# Patient Record
Sex: FEMALE | Race: OTHER | HISPANIC OR LATINO | URBAN - METROPOLITAN AREA
[De-identification: names, ages, dates, MRNs, and addresses within clinical notes are randomized per-mention and may not be internally consistent; named-entity substitution may affect disease eponyms.]

---

## 2022-10-05 ENCOUNTER — INPATIENT (INPATIENT)
Facility: HOSPITAL | Age: 42
LOS: 6 days | Discharge: HOME HEALTH | DRG: 65 | End: 2022-10-12
Attending: PSYCHIATRY & NEUROLOGY | Admitting: NEUROLOGICAL SURGERY
Payer: MEDICAID

## 2022-10-05 VITALS
RESPIRATION RATE: 18 BRPM | TEMPERATURE: 98 F | OXYGEN SATURATION: 98 % | WEIGHT: 132.28 LBS | SYSTOLIC BLOOD PRESSURE: 122 MMHG | HEART RATE: 78 BPM | DIASTOLIC BLOOD PRESSURE: 81 MMHG | HEIGHT: 61.02 IN

## 2022-10-05 DIAGNOSIS — I60.9 NONTRAUMATIC SUBARACHNOID HEMORRHAGE, UNSPECIFIED: ICD-10-CM

## 2022-10-05 LAB
A1C WITH ESTIMATED AVERAGE GLUCOSE RESULT: 5.5 % — SIGNIFICANT CHANGE UP (ref 4–5.6)
ALBUMIN SERPL ELPH-MCNC: 4.8 G/DL — SIGNIFICANT CHANGE UP (ref 3.3–5)
ALP SERPL-CCNC: 67 U/L — SIGNIFICANT CHANGE UP (ref 40–120)
ALT FLD-CCNC: 28 U/L — SIGNIFICANT CHANGE UP (ref 10–45)
AMPHET UR-MCNC: NEGATIVE — SIGNIFICANT CHANGE UP
ANION GAP SERPL CALC-SCNC: 11 MMOL/L — SIGNIFICANT CHANGE UP (ref 5–17)
APPEARANCE UR: CLEAR — SIGNIFICANT CHANGE UP
APTT BLD: 32.6 SEC — SIGNIFICANT CHANGE UP (ref 27.5–35.5)
AST SERPL-CCNC: 18 U/L — SIGNIFICANT CHANGE UP (ref 10–40)
BACTERIA # UR AUTO: PRESENT /HPF
BARBITURATES UR SCN-MCNC: NEGATIVE — SIGNIFICANT CHANGE UP
BASOPHILS # BLD AUTO: 0.02 K/UL — SIGNIFICANT CHANGE UP (ref 0–0.2)
BASOPHILS NFR BLD AUTO: 0.2 % — SIGNIFICANT CHANGE UP (ref 0–2)
BENZODIAZ UR-MCNC: NEGATIVE — SIGNIFICANT CHANGE UP
BILIRUB SERPL-MCNC: 0.3 MG/DL — SIGNIFICANT CHANGE UP (ref 0.2–1.2)
BILIRUB UR-MCNC: NEGATIVE — SIGNIFICANT CHANGE UP
BLD GP AB SCN SERPL QL: NEGATIVE — SIGNIFICANT CHANGE UP
BUN SERPL-MCNC: 16 MG/DL — SIGNIFICANT CHANGE UP (ref 7–23)
CALCIUM SERPL-MCNC: 8.8 MG/DL — SIGNIFICANT CHANGE UP (ref 8.4–10.5)
CHLORIDE SERPL-SCNC: 103 MMOL/L — SIGNIFICANT CHANGE UP (ref 96–108)
CHOLEST SERPL-MCNC: 142 MG/DL — SIGNIFICANT CHANGE UP
CO2 SERPL-SCNC: 23 MMOL/L — SIGNIFICANT CHANGE UP (ref 22–31)
COCAINE METAB.OTHER UR-MCNC: NEGATIVE — SIGNIFICANT CHANGE UP
COLOR SPEC: YELLOW — SIGNIFICANT CHANGE UP
COMMENT - URINE: SIGNIFICANT CHANGE UP
CREAT SERPL-MCNC: 0.57 MG/DL — SIGNIFICANT CHANGE UP (ref 0.5–1.3)
CRP SERPL-MCNC: <3 MG/L — SIGNIFICANT CHANGE UP (ref 0–4)
DIFF PNL FLD: ABNORMAL
EGFR: 116 ML/MIN/1.73M2 — SIGNIFICANT CHANGE UP
EOSINOPHIL # BLD AUTO: 0 K/UL — SIGNIFICANT CHANGE UP (ref 0–0.5)
EOSINOPHIL NFR BLD AUTO: 0 % — SIGNIFICANT CHANGE UP (ref 0–6)
EPI CELLS # UR: SIGNIFICANT CHANGE UP /HPF (ref 0–5)
ERYTHROCYTE [SEDIMENTATION RATE] IN BLOOD: 20 MM/HR — HIGH
ESTIMATED AVERAGE GLUCOSE: 111 MG/DL — SIGNIFICANT CHANGE UP (ref 68–114)
GLUCOSE BLDC GLUCOMTR-MCNC: 87 MG/DL — SIGNIFICANT CHANGE UP (ref 70–99)
GLUCOSE BLDC GLUCOMTR-MCNC: 88 MG/DL — SIGNIFICANT CHANGE UP (ref 70–99)
GLUCOSE SERPL-MCNC: 119 MG/DL — HIGH (ref 70–99)
GLUCOSE UR QL: NEGATIVE — SIGNIFICANT CHANGE UP
HCG SERPL-ACNC: 1 MIU/ML — SIGNIFICANT CHANGE UP
HCT VFR BLD CALC: 38.9 % — SIGNIFICANT CHANGE UP (ref 34.5–45)
HDLC SERPL-MCNC: 38 MG/DL — LOW
HGB BLD-MCNC: 12.6 G/DL — SIGNIFICANT CHANGE UP (ref 11.5–15.5)
IMM GRANULOCYTES NFR BLD AUTO: 0.6 % — SIGNIFICANT CHANGE UP (ref 0–0.9)
INR BLD: 1.1 — SIGNIFICANT CHANGE UP (ref 0.88–1.16)
KETONES UR-MCNC: NEGATIVE — SIGNIFICANT CHANGE UP
LEUKOCYTE ESTERASE UR-ACNC: NEGATIVE — SIGNIFICANT CHANGE UP
LIPID PNL WITH DIRECT LDL SERPL: 83 MG/DL — SIGNIFICANT CHANGE UP
LYMPHOCYTES # BLD AUTO: 1.45 K/UL — SIGNIFICANT CHANGE UP (ref 1–3.3)
LYMPHOCYTES # BLD AUTO: 12.8 % — LOW (ref 13–44)
MAGNESIUM SERPL-MCNC: 2 MG/DL — SIGNIFICANT CHANGE UP (ref 1.6–2.6)
MCHC RBC-ENTMCNC: 28.4 PG — SIGNIFICANT CHANGE UP (ref 27–34)
MCHC RBC-ENTMCNC: 32.4 GM/DL — SIGNIFICANT CHANGE UP (ref 32–36)
MCV RBC AUTO: 87.8 FL — SIGNIFICANT CHANGE UP (ref 80–100)
METHADONE UR-MCNC: NEGATIVE — SIGNIFICANT CHANGE UP
MONOCYTES # BLD AUTO: 0.29 K/UL — SIGNIFICANT CHANGE UP (ref 0–0.9)
MONOCYTES NFR BLD AUTO: 2.6 % — SIGNIFICANT CHANGE UP (ref 2–14)
NEUTROPHILS # BLD AUTO: 9.48 K/UL — HIGH (ref 1.8–7.4)
NEUTROPHILS NFR BLD AUTO: 83.8 % — HIGH (ref 43–77)
NITRITE UR-MCNC: NEGATIVE — SIGNIFICANT CHANGE UP
NON HDL CHOLESTEROL: 104 MG/DL — SIGNIFICANT CHANGE UP
NRBC # BLD: 0 /100 WBCS — SIGNIFICANT CHANGE UP (ref 0–0)
OPIATES UR-MCNC: NEGATIVE — SIGNIFICANT CHANGE UP
PCP SPEC-MCNC: SIGNIFICANT CHANGE UP
PCP UR-MCNC: NEGATIVE — SIGNIFICANT CHANGE UP
PH UR: 7 — SIGNIFICANT CHANGE UP (ref 5–8)
PHOSPHATE SERPL-MCNC: 3.4 MG/DL — SIGNIFICANT CHANGE UP (ref 2.5–4.5)
PLATELET # BLD AUTO: 366 K/UL — SIGNIFICANT CHANGE UP (ref 150–400)
POTASSIUM SERPL-MCNC: 3.9 MMOL/L — SIGNIFICANT CHANGE UP (ref 3.5–5.3)
POTASSIUM SERPL-SCNC: 3.9 MMOL/L — SIGNIFICANT CHANGE UP (ref 3.5–5.3)
PROT SERPL-MCNC: 8.2 G/DL — SIGNIFICANT CHANGE UP (ref 6–8.3)
PROT UR-MCNC: NEGATIVE MG/DL — SIGNIFICANT CHANGE UP
PROTHROM AB SERPL-ACNC: 13.1 SEC — SIGNIFICANT CHANGE UP (ref 10.5–13.4)
RBC # BLD: 4.43 M/UL — SIGNIFICANT CHANGE UP (ref 3.8–5.2)
RBC # FLD: 12.9 % — SIGNIFICANT CHANGE UP (ref 10.3–14.5)
RBC CASTS # UR COMP ASSIST: > 10 /HPF
RH IG SCN BLD-IMP: POSITIVE — SIGNIFICANT CHANGE UP
SARS-COV-2 RNA SPEC QL NAA+PROBE: NEGATIVE — SIGNIFICANT CHANGE UP
SODIUM SERPL-SCNC: 137 MMOL/L — SIGNIFICANT CHANGE UP (ref 135–145)
SP GR SPEC: 1.02 — SIGNIFICANT CHANGE UP (ref 1–1.03)
THC UR QL: NEGATIVE — SIGNIFICANT CHANGE UP
TRIGL SERPL-MCNC: 104 MG/DL — SIGNIFICANT CHANGE UP
TROPONIN T SERPL-MCNC: 0.01 NG/ML — SIGNIFICANT CHANGE UP (ref 0–0.01)
TSH SERPL-MCNC: 1.25 UIU/ML — SIGNIFICANT CHANGE UP (ref 0.27–4.2)
UROBILINOGEN FLD QL: 0.2 E.U./DL — SIGNIFICANT CHANGE UP
WBC # BLD: 11.31 K/UL — HIGH (ref 3.8–10.5)
WBC # FLD AUTO: 11.31 K/UL — HIGH (ref 3.8–10.5)
WBC UR QL: < 5 /HPF — SIGNIFICANT CHANGE UP

## 2022-10-05 PROCEDURE — 99291 CRITICAL CARE FIRST HOUR: CPT

## 2022-10-05 PROCEDURE — 70498 CT ANGIOGRAPHY NECK: CPT | Mod: 26,MG

## 2022-10-05 PROCEDURE — G1004: CPT

## 2022-10-05 PROCEDURE — 70450 CT HEAD/BRAIN W/O DYE: CPT | Mod: 26,59,ME

## 2022-10-05 PROCEDURE — 99285 EMERGENCY DEPT VISIT HI MDM: CPT

## 2022-10-05 PROCEDURE — 70496 CT ANGIOGRAPHY HEAD: CPT | Mod: 26,ME

## 2022-10-05 PROCEDURE — 70450 CT HEAD/BRAIN W/O DYE: CPT | Mod: 26,77,59

## 2022-10-05 PROCEDURE — 93010 ELECTROCARDIOGRAM REPORT: CPT

## 2022-10-05 RX ORDER — TRAMADOL HYDROCHLORIDE 50 MG/1
25 TABLET ORAL EVERY 6 HOURS
Refills: 0 | Status: DISCONTINUED | OUTPATIENT
Start: 2022-10-05 | End: 2022-10-05

## 2022-10-05 RX ORDER — DEXTROSE 50 % IN WATER 50 %
15 SYRINGE (ML) INTRAVENOUS ONCE
Refills: 0 | Status: DISCONTINUED | OUTPATIENT
Start: 2022-10-05 | End: 2022-10-05

## 2022-10-05 RX ORDER — SODIUM CHLORIDE 9 MG/ML
1000 INJECTION INTRAMUSCULAR; INTRAVENOUS; SUBCUTANEOUS ONCE
Refills: 0 | Status: COMPLETED | OUTPATIENT
Start: 2022-10-05 | End: 2022-10-05

## 2022-10-05 RX ORDER — GLUCAGON INJECTION, SOLUTION 0.5 MG/.1ML
1 INJECTION, SOLUTION SUBCUTANEOUS ONCE
Refills: 0 | Status: DISCONTINUED | OUTPATIENT
Start: 2022-10-05 | End: 2022-10-05

## 2022-10-05 RX ORDER — ACETAMINOPHEN 500 MG
650 TABLET ORAL EVERY 6 HOURS
Refills: 0 | Status: DISCONTINUED | OUTPATIENT
Start: 2022-10-05 | End: 2022-10-08

## 2022-10-05 RX ORDER — DEXTROSE 50 % IN WATER 50 %
25 SYRINGE (ML) INTRAVENOUS ONCE
Refills: 0 | Status: DISCONTINUED | OUTPATIENT
Start: 2022-10-05 | End: 2022-10-05

## 2022-10-05 RX ORDER — DIPHENHYDRAMINE HCL 50 MG
25 CAPSULE ORAL ONCE
Refills: 0 | Status: COMPLETED | OUTPATIENT
Start: 2022-10-05 | End: 2022-10-05

## 2022-10-05 RX ORDER — TRAMADOL HYDROCHLORIDE 50 MG/1
50 TABLET ORAL EVERY 6 HOURS
Refills: 0 | Status: DISCONTINUED | OUTPATIENT
Start: 2022-10-05 | End: 2022-10-05

## 2022-10-05 RX ORDER — METOCLOPRAMIDE HCL 10 MG
10 TABLET ORAL ONCE
Refills: 0 | Status: DISCONTINUED | OUTPATIENT
Start: 2022-10-05 | End: 2022-10-05

## 2022-10-05 RX ORDER — SODIUM CHLORIDE 9 MG/ML
1000 INJECTION, SOLUTION INTRAVENOUS
Refills: 0 | Status: DISCONTINUED | OUTPATIENT
Start: 2022-10-05 | End: 2022-10-06

## 2022-10-05 RX ORDER — OXYCODONE AND ACETAMINOPHEN 5; 325 MG/1; MG/1
1 TABLET ORAL EVERY 6 HOURS
Refills: 0 | Status: DISCONTINUED | OUTPATIENT
Start: 2022-10-05 | End: 2022-10-05

## 2022-10-05 RX ORDER — NIMODIPINE 60 MG/10ML
60 SOLUTION ORAL EVERY 4 HOURS
Refills: 0 | Status: DISCONTINUED | OUTPATIENT
Start: 2022-10-05 | End: 2022-10-06

## 2022-10-05 RX ORDER — OXYCODONE AND ACETAMINOPHEN 5; 325 MG/1; MG/1
2 TABLET ORAL EVERY 6 HOURS
Refills: 0 | Status: DISCONTINUED | OUTPATIENT
Start: 2022-10-05 | End: 2022-10-05

## 2022-10-05 RX ORDER — SENNA PLUS 8.6 MG/1
2 TABLET ORAL AT BEDTIME
Refills: 0 | Status: DISCONTINUED | OUTPATIENT
Start: 2022-10-05 | End: 2022-10-12

## 2022-10-05 RX ORDER — ONDANSETRON 8 MG/1
4 TABLET, FILM COATED ORAL ONCE
Refills: 0 | Status: COMPLETED | OUTPATIENT
Start: 2022-10-05 | End: 2022-10-05

## 2022-10-05 RX ORDER — METOCLOPRAMIDE HCL 10 MG
10 TABLET ORAL ONCE
Refills: 0 | Status: COMPLETED | OUTPATIENT
Start: 2022-10-05 | End: 2022-10-05

## 2022-10-05 RX ORDER — OXYCODONE HYDROCHLORIDE 5 MG/1
10 TABLET ORAL EVERY 6 HOURS
Refills: 0 | Status: DISCONTINUED | OUTPATIENT
Start: 2022-10-05 | End: 2022-10-08

## 2022-10-05 RX ORDER — LEVETIRACETAM 250 MG/1
500 TABLET, FILM COATED ORAL
Refills: 0 | Status: DISCONTINUED | OUTPATIENT
Start: 2022-10-05 | End: 2022-10-06

## 2022-10-05 RX ORDER — SODIUM CHLORIDE 9 MG/ML
1000 INJECTION INTRAMUSCULAR; INTRAVENOUS; SUBCUTANEOUS
Refills: 0 | Status: DISCONTINUED | OUTPATIENT
Start: 2022-10-05 | End: 2022-10-06

## 2022-10-05 RX ORDER — INSULIN LISPRO 100/ML
VIAL (ML) SUBCUTANEOUS
Refills: 0 | Status: DISCONTINUED | OUTPATIENT
Start: 2022-10-05 | End: 2022-10-12

## 2022-10-05 RX ORDER — CHLORHEXIDINE GLUCONATE 213 G/1000ML
1 SOLUTION TOPICAL DAILY
Refills: 0 | Status: DISCONTINUED | OUTPATIENT
Start: 2022-10-05 | End: 2022-10-08

## 2022-10-05 RX ORDER — ACETAMINOPHEN 500 MG
1000 TABLET ORAL ONCE
Refills: 0 | Status: COMPLETED | OUTPATIENT
Start: 2022-10-05 | End: 2022-10-05

## 2022-10-05 RX ORDER — ONDANSETRON 8 MG/1
4 TABLET, FILM COATED ORAL EVERY 6 HOURS
Refills: 0 | Status: DISCONTINUED | OUTPATIENT
Start: 2022-10-05 | End: 2022-10-12

## 2022-10-05 RX ORDER — OXYCODONE HYDROCHLORIDE 5 MG/1
5 TABLET ORAL EVERY 6 HOURS
Refills: 0 | Status: DISCONTINUED | OUTPATIENT
Start: 2022-10-05 | End: 2022-10-08

## 2022-10-05 RX ADMIN — Medication 1000 MILLIGRAM(S): at 17:11

## 2022-10-05 RX ADMIN — TRAMADOL HYDROCHLORIDE 50 MILLIGRAM(S): 50 TABLET ORAL at 19:53

## 2022-10-05 RX ADMIN — ONDANSETRON 4 MILLIGRAM(S): 8 TABLET, FILM COATED ORAL at 19:37

## 2022-10-05 RX ADMIN — ONDANSETRON 4 MILLIGRAM(S): 8 TABLET, FILM COATED ORAL at 22:29

## 2022-10-05 RX ADMIN — Medication 25 MILLIGRAM(S): at 11:08

## 2022-10-05 RX ADMIN — SODIUM CHLORIDE 1000 MILLILITER(S): 9 INJECTION INTRAMUSCULAR; INTRAVENOUS; SUBCUTANEOUS at 11:08

## 2022-10-05 RX ADMIN — NIMODIPINE 60 MILLIGRAM(S): 60 SOLUTION ORAL at 21:14

## 2022-10-05 RX ADMIN — SENNA PLUS 2 TABLET(S): 8.6 TABLET ORAL at 21:14

## 2022-10-05 RX ADMIN — Medication 104 MILLIGRAM(S): at 11:08

## 2022-10-05 RX ADMIN — NIMODIPINE 60 MILLIGRAM(S): 60 SOLUTION ORAL at 18:13

## 2022-10-05 RX ADMIN — Medication 400 MILLIGRAM(S): at 16:13

## 2022-10-05 RX ADMIN — OXYCODONE AND ACETAMINOPHEN 1 TABLET(S): 5; 325 TABLET ORAL at 21:01

## 2022-10-05 RX ADMIN — OXYCODONE HYDROCHLORIDE 5 MILLIGRAM(S): 5 TABLET ORAL at 22:39

## 2022-10-05 RX ADMIN — TRAMADOL HYDROCHLORIDE 50 MILLIGRAM(S): 50 TABLET ORAL at 20:13

## 2022-10-05 RX ADMIN — LEVETIRACETAM 500 MILLIGRAM(S): 250 TABLET, FILM COATED ORAL at 18:13

## 2022-10-05 RX ADMIN — Medication 400 MILLIGRAM(S): at 11:13

## 2022-10-05 RX ADMIN — Medication 1000 MILLIGRAM(S): at 21:09

## 2022-10-05 RX ADMIN — Medication 5 MILLIGRAM(S): at 21:16

## 2022-10-05 NOTE — ED PROVIDER NOTE - NS ED ATTENDING STATEMENT MOD
This was a shared visit with the МАРИНА. I reviewed and verified the documentation and independently performed the documented:

## 2022-10-05 NOTE — ED PROVIDER NOTE - NS ED ROS FT
Constitutional: No fever. No chills.  Eyes: No redness. No discharge. No vision change.   ENT: No sore throat. No ear pain.  Cardiovascular: No chest pain. No leg swelling.  Respiratory: No cough. No shortness of breath.  GI: No abdominal pain. +vomiting. No diarrhea.   MSK: No joint pain. No back pain.   Skin: No rash. No abrasions.   Neuro: No numbness. No weakness. +headache.   Psych: No known mental health issues.

## 2022-10-05 NOTE — PROGRESS NOTE ADULT - ASSESSMENT
Assessment: 42F no medical hx admit for SAH ?vasculitis pending DSA/MRA       NEURO:  -neuro check q1  will manage as aSAH until proven negative on DSA   -nimodipine 60mg q4, keep euvolemic normonatremic   -pain management w/ Tylenol & oxycodone  -pending DSA in AM   -MRI brain +/- contrast   Vasculitis w/u -labs sent ; add antibiodies to Ro/SSA, La/SSb, dsDNA, cryoglobulins, quantitative immunoglobulin (IgG, IgM, IgA)  -PT/OT evaluation  Consider LP r/o primary angitis of CNS (PACNS)    PULMONARY:  saturating well on RA,   -continue to monitor on pulse o2     CARDIOVASCULAR:  monitor on telemetry   vitals q1  sbp goal 100-140  TTE ordered and pending    GASTROENTEROLOGY:  regular diet, NPO after midnight   ensure BMs w/ doculax & senna  Daily stool count, LBM prior to arrival    RENAL/:  -check BMP qd  -strict i/o's ;   -Na goal 135-145  -urine tox     ENDOCRINE:  A1c- 5.5  TSH-1.2    HEME/ONC:  DVT ppx: will hold chemical dvt ppx in setting of SAH  b/l SCDs  Obtain b/l LE screening dopplers    INFECTIOUS:   Monitor for fevers   check Hepatitis B & C, syphilis , HIV    Assessment: 42F no medical hx admit for SAH ?vasculitis pending DSA/MRA       NEURO:  -neuro check q1  will manage as aSAH until proven negative on DSA   -nimodipine 60mg q4, keep euvolemic normonatremic   -pain management w/ Tylenol & oxycodone  -pending DSA in AM   -MRI brain +/- contrast   Vasculitis w/u -labs sent ; add antibiodies to Ro/SSA, La/SSb, dsDNA, cryoglobulins, quantitative immunoglobulin (IgG, IgM, IgA)  -PT/OT evaluation  Consider LP r/o primary angitis of CNS (PACNS)  seizure ppx - c/w keppra 500mg BID     PULMONARY:  saturating well on RA,   -continue to monitor on pulse o2     CARDIOVASCULAR:  monitor on telemetry   vitals q1  sbp goal 100-140  TTE ordered and pending    GASTROENTEROLOGY:  regular diet, NPO after midnight   ensure BMs w/ doculax & senna  Daily stool count, LBM prior to arrival    RENAL/:  -check BMP qd  -strict i/o's ;   -Na goal 135-145  -urine tox     ENDOCRINE:  A1c- 5.5  TSH-1.2    HEME/ONC:  DVT ppx: will hold chemical dvt ppx in setting of SAH  b/l SCDs  Obtain b/l LE screening dopplers    INFECTIOUS:   Monitor for fevers   check Hepatitis B & C, syphilis , HIV

## 2022-10-05 NOTE — H&P ADULT - HISTORY OF PRESENT ILLNESS
41 yo female no pmh presenting with headache x 1 week. Pt also complaining of intermittent left eye pain with blurry vision x 3 months. CTA reveals small volume of SAH in the right temporal/occipital sulci. CTH reveals focal areas of narrowing involving R A2 and L A3 segments suspicious for vasculitis and L pcomm infundibulum. Patient reports taking baby aspirin for the past 3 days for pain control, last taken on 10/4. No history of trauma. Pt denies dizziness, n/v, blurry vision, SOB, CP, n/v, localized weakness or numbness/tingling. NIHSS 0, HH2, MF1.

## 2022-10-05 NOTE — ED PROVIDER NOTE - ATTENDING APP SHARED VISIT CONTRIBUTION OF CARE
41yo female with no reported pmhx presents with 8 days of posterior headache. Pt reports throbbing, constant headache, worse with head movement. Associated with multiple episodes of vomiting, last episode this morning. She denies recent trauma or injury. She reports remote head injury 15yr ago falling off a horse, had a similar headache at that time. She denies anticoagulant use. She also endorses body aches and chills. She denies neck stiffness, vision changes, photophobia or phonophobia, numbness or weakness of extremities, gait instability, syncope. She took one aspirin at home for pain, no additional medications.    ED course unremarkable - she is afebrile and hemodynamically stable. Normotensive throughout ED stay. No head injury or evidence of trauma on exam. She is neurologically intact. Given 1L NS bolus, IV tylenol, IV reglan, IV benadryl with improvement. CBC with slight leukocytosis. BMP unremarkable. UA with large blood, pt currently menstruating. CTH notable for small volume of subarachnoid hemorrhage within posterior right temporal and occipital sulci. CTA head and neck ordered. NSG consulted and will admit pt to NS ICU. All results discussed with patient in Salvadorean.    Agree with above note as documented by PA.  I was available as the supervising attending during patient's ER evaluation.    Pt with severe headache - found to have stable bp and no neuro deficits.  NS called for admission and agrees on admission to [facility].  Stable while in ED.

## 2022-10-05 NOTE — ED PROVIDER NOTE - OBJECTIVE STATEMENT
41yo female with no reported pmhx presents with 8 days of posterior headache. Pt reports throbbing, constant headache, worse with head movement. Associated with multiple episodes of vomiting, last episode this morning. She denies recent trauma or injury. She reports remote head injury 15yr ago falling off a horse, had a similar headache at that time. She denies anticoagulant use. She also endorses body aches and chills. She denies neck stiffness, vision changes, photophobia or phonophobia, numbness or weakness of extremities, gait instability, syncope. She took one aspirin at home for pain, no additional medications.

## 2022-10-05 NOTE — PATIENT PROFILE ADULT - FALL HARM RISK - HARM RISK INTERVENTIONS

## 2022-10-05 NOTE — ED PROVIDER NOTE - CLINICAL SUMMARY MEDICAL DECISION MAKING FREE TEXT BOX
ED course unremarkable - she is afebrile and hemodynamically stable. Normotensive throughout ED stay. No head injury or evidence of trauma on exam. She is neurologically intact. Given 1L NS bolus, IV tylenol, IV reglan, IV benadryl with improvement. CBC with slight leukocytosis. BMP unremarkable. UA with large blood, pt currently menstruating. CTH notable for small volume of subarachnoid hemorrhage within posterior right temporal and occipital sulci. CTa head and neck ordered. NSG consulted and [place] [facility]. All results discussed with patient in Jamaican.

## 2022-10-05 NOTE — ED ADULT TRIAGE NOTE - RESPIRATORY RATE (BREATHS/MIN)
18 Cephalexin Pregnancy And Lactation Text: This medication is Pregnancy Category B and considered safe during pregnancy.  It is also excreted in breast milk but can be used safely for shorter doses.

## 2022-10-05 NOTE — ED ADULT NURSE REASSESSMENT NOTE - NS ED NURSE REASSESS COMMENT FT1
Pt.  made UG from SS portion of ED to MD Nuvia Corcoran, and from PHI Watkins to PHI Perrin on [facility]. Pt. placed on ccm, pulseox, and auto BP. Neuro sgy @ bedside, second IV access placed. will continue to assess.

## 2022-10-05 NOTE — PROGRESS NOTE ADULT - ASSESSMENT
ASSESSMENT/PLAN: HH2 mF1 subarachnoid hemorrhage, bleed day 1  Etiology- possible vasculitis  L PCOMM infundibulum on CTA    NEURO:  Diagnosis: CT Head, CTA Head reviewed. Focal narrowing of R A2, L A2   Stability CT head pending  MRI w/wo pending  Pending conventional angiogram; neurointervention alerted  Seizure Prophylaxis: Levetiracetam for now  Q1 neurochecks  Delayed Cerebral Ischemia Management: Euvolemia, nimodipine.  Pain: PRN analgesics  Vasculitis workup: Send BILL, ESR, CRP, complement levels C3, C4, ANCA.  Urine toxicology screen  Stroke core measures  Stroke consult  Activity: [] OOB as tolerated [x] Bedrest [] PT [] OT [] PMNR    PULM:  Incentive spirometry  On room air    CV:  SBP goal 90- 140  Baseline EKG- T wave inversions in V2  TTE pending  Send troponin    RENAL:  Fluids: NS@60  Monitor electrolytes    GI:  Diet: NPO at MN.  GI prophylaxis [] not indicated [] PPI [] other:  Bowel regimen [] colace [x] senna [] other:    ENDO:   Goal euglycemia (-180)  A1c, TSH, LDL.    HEME/ONC: ASA 3 days ago  VTE prophylaxis: [x] SCDs [] chemoprophylaxis   [x] hold chemoprophylaxis due to: [] high risk of DVT/PE on admission due to:    ID: Mild leukocytosis  Afebrile. Monitor for signs of infection    MISC:  Consider rheumatology consult if suspicious for vasculitis    SOCIAL/FAMILY:  [x] awaiting [] updated at bedside [] family meeting    CODE STATUS:  [x] Full Code [] DNR [] DNI [] Palliative/Comfort Care    DISPOSITION:  [x] ICU [] Stroke Unit [] Floor [] EMU [] RCU [] PCU    [x] Patient is at high risk of neurologic deterioration/death due to:     Time spent: 45 critical care minutes ASSESSMENT/PLAN: HH2 mF1 subarachnoid hemorrhage, bleed day 1  Etiology- possible vasculitis  L PCOMM infundibulum on CTA    NEURO:  Diagnosis: CT Head, CTA Head reviewed. Focal narrowing of R A2, L A2   Stability CT head pending  MRI w/wo pending  Pending conventional angiogram; (neurointervention alerted)  Seizure Prophylaxis: Levetiracetam for now  Q1 neurochecks  Delayed Cerebral Ischemia Management: Euvolemia, nimodipine.  Pain: PRN analgesics  Vasculitis workup: Send BILL, ESR, CRP, complement levels C3, C4, ANCA.  Urine toxicology screen  Stroke core measures  Stroke  neurology consult  Activity: [] OOB as tolerated [x] Bedrest [] PT [] OT [] PMNR    PULM:  Incentive spirometry  On room air    CV:  SBP goal 90- 140  Baseline EKG- T wave inversions in V2  TTE pending  Send troponin    RENAL:  Fluids: NS@60  Monitor electrolytes    GI:  Diet: NPO at MN.  GI prophylaxis [] not indicated [] PPI [] other:  Bowel regimen [] colace [x] senna [] other:    ENDO:   Goal euglycemia (-180)  A1c, TSH, LDL.    HEME/ONC: ASA 3 days ago  VTE prophylaxis: [x] SCDs [] chemoprophylaxis   [x] hold chemoprophylaxis due to: [] high risk of DVT/PE on admission due to:    ID: Mild leukocytosis  Afebrile. Monitor for signs of infection    MISC:  Consider rheumatology consult if suspicious for vasculitis    SOCIAL/FAMILY:  [x] awaiting [] updated at bedside [] family meeting    CODE STATUS:  [x] Full Code [] DNR [] DNI [] Palliative/Comfort Care    DISPOSITION:  [x] ICU [] Stroke Unit [] Floor [] EMU [] RCU [] PCU    [x] Patient is at high risk of neurologic deterioration/death due to: vasospasm, seizure, VTE    Time spent: 45 critical care minutes

## 2022-10-05 NOTE — PROGRESS NOTE ADULT - SUBJECTIVE AND OBJECTIVE BOX
INTERVAL HISTORY: HPI:  41 yo female no pmh presenting with headache x 1 week. Pt also complaining of intermittent left eye pain with blurry vision x 3 months. CTA reveals small volume of SAH in the right temporal/occipital sulci. CTH reveals focal areas of narrowing involving R A2 and L A3 segments suspicious for vasculitis and L pcomm infundibulum. Patient reports taking baby aspirin for the past 3 days for pain control, last taken on 10/4. No history of trauma. Pt denies dizziness, n/v, blurry vision, SOB, CP, n/v, localized weakness or numbness/tingling. NIHSS 0, HH2, MF1. (05 Oct 2022 15:07)      MEDICATIONS  (STANDING):  bisacodyl 5 milliGRAM(s) Oral at bedtime  chlorhexidine 2% Cloths 1 Application(s) Topical daily  dextrose 5%. 1000 milliLiter(s) (50 mL/Hr) IV Continuous <Continuous>  insulin lispro (ADMELOG) corrective regimen sliding scale   SubCutaneous Before meals and at bedtime  levETIRAcetam 500 milliGRAM(s) Oral two times a day  niMODipine 60 milliGRAM(s) Oral every 4 hours  senna 2 Tablet(s) Oral at bedtime  sodium chloride 0.9%. 1000 milliLiter(s) (60 mL/Hr) IV Continuous <Continuous>    MEDICATIONS  (PRN):  acetaminophen     Tablet .. 650 milliGRAM(s) Oral every 6 hours PRN Temp greater or equal to 38C (100.4F), Mild Pain (1 - 3)  ondansetron Injectable 4 milliGRAM(s) IV Push every 6 hours PRN Nausea and/or Vomiting  oxyCODONE    IR 5 milliGRAM(s) Oral every 6 hours PRN Moderate Pain (4 - 6)  oxyCODONE    IR 10 milliGRAM(s) Oral every 6 hours PRN Severe Pain (7 - 10)      Drug Dosing Weight  Height (cm): 155 (05 Oct 2022 09:32)  Weight (kg): 60 (05 Oct 2022 09:32)  BMI (kg/m2): 25 (05 Oct 2022 09:32)  BSA (m2): 1.59 (05 Oct 2022 09:32)    PAST MEDICAL & SURGICAL HISTORY:  Subarachnoid hemorrhage, nontraumatic      REVIEW OF SYSTEMS: [ ] Unable to Assess due to neurologic exam   [x] All ROS addressed below are non-contributory, except:  Neuro: [ ] Headache [ ] Back pain [ ] Numbness [ ] Weakness [ ] Ataxia [ ] Dizziness [ ] Aphasia [ ] Dysarthria [ ] Visual disturbance  Resp: [ ] Shortness of breath/dyspnea, [ ] Orthopnea [ ] Cough  CV: [ ] Chest pain [ ] Palpitation [ ] Lightheadedness [ ] Syncope  Renal: [ ] Thirst [ ] Edema  GI: [ ] Nausea [ ] Emesis [ ] Abdominal pain [ ] Constipation [ ] Diarrhea  Hem: [ ] Hematemesis [ ] bright red blood per rectum  ID: [ ] Fever [ ] Chills [ ] Dysuria  ENT: [ ] Rhinorrhea    PHYSICAL EXAM:    General: No Acute Distress   Neurological: Awake, alert oriented to person, place and time, Following Commands, PERRL, EOMI, Face Symmetrical, Speech Fluent, Moving all extremities, Muscle Strength normal in all four extremities, No Drift, Sensation to Light Touch Intact  Pulmonary: Clear to Auscultation, No Rales, No Rhonchi, No Wheezes   Cardiovascular: S1, S2, Regular Rate and rhythm   Gastrointestinal: Soft, Nontender, Nondistended   Extremities: No calf tenderness       ICU Vital Signs Last 24 Hrs  T(C): 37.1 (05 Oct 2022 16:00), Max: 37.1 (05 Oct 2022 16:00)  T(F): 98.8 (05 Oct 2022 16:00), Max: 98.8 (05 Oct 2022 16:00)  HR: 68 (05 Oct 2022 21:00) (52 - 80)  BP: 118/58 (05 Oct 2022 21:00) (104/59 - 129/65)  BP(mean): 82 (05 Oct 2022 21:00) (77 - 90)  RR: 14 (05 Oct 2022 21:00) (14 - 18)  SpO2: 98% (05 Oct 2022 21:00) (98% - 99%)    O2 Parameters below as of 05 Oct 2022 22:00  Patient On (Oxygen Delivery Method): room air    I&O's Detail    05 Oct 2022 07:01  -  05 Oct 2022 21:35  --------------------------------------------------------  IN:    Oral Fluid: 120 mL    sodium chloride 0.9%: 420 mL  Total IN: 540 mL    OUT:    Voided (mL): 600 mL  Total OUT: 600 mL    Total NET: -60 mL      LABS:  CBC Full  -  ( 05 Oct 2022 09:39 )  WBC Count : 11.31 K/uL  RBC Count : 4.43 M/uL  Hemoglobin : 12.6 g/dL  Hematocrit : 38.9 %  Platelet Count - Automated : 366 K/uL  Mean Cell Volume : 87.8 fl  Mean Cell Hemoglobin : 28.4 pg  Mean Cell Hemoglobin Concentration : 32.4 gm/dL  Auto Neutrophil # : 9.48 K/uL  Auto Lymphocyte # : 1.45 K/uL  Auto Monocyte # : 0.29 K/uL  Auto Eosinophil # : 0.00 K/uL  Auto Basophil # : 0.02 K/uL  Auto Neutrophil % : 83.8 %  Auto Lymphocyte % : 12.8 %  Auto Monocyte % : 2.6 %  Auto Eosinophil % : 0.0 %  Auto Basophil % : 0.2 %    10-    137  |  103  |  16  ----------------------------<  119<H>  3.9   |  23  |  0.57    Ca    8.8      05 Oct 2022 09:39  Phos  3.4     10-05  Mg     2.0     10-05    TPro  8.2  /  Alb  4.8  /  TBili  0.3  /  DBili  x   /  AST  18  /  ALT  28  /  AlkPhos  67  10-05    PT/INR - ( 05 Oct 2022 12:40 )   PT: 13.1 sec;   INR: 1.10          PTT - ( 05 Oct 2022 12:40 )  PTT:32.6 sec  Urinalysis Basic - ( 05 Oct 2022 09:39 )    Color: Yellow / Appearance: Clear / S.020 / pH: x  Gluc: x / Ketone: NEGATIVE  / Bili: Negative / Urobili: 0.2 E.U./dL   Blood: x / Protein: NEGATIVE mg/dL / Nitrite: NEGATIVE   Leuk Esterase: NEGATIVE / RBC: > 10 /HPF / WBC < 5 /HPF   Sq Epi: x / Non Sq Epi: 0-5 /HPF / Bacteria: Present /HPF    RADIOLOGY & ADDITIONAL STUDIES:

## 2022-10-05 NOTE — ED PROVIDER NOTE - PHYSICAL EXAMINATION
VITAL SIGNS: I have reviewed nursing notes and confirm.  CONSTITUTIONAL: Well-developed; well-nourished; in no acute distress.   SKIN:  warm and dry, no acute rash.   HEAD:  normocephalic, atraumatic.  EYES: PERRL, EOM intact; conjunctiva and sclera clear.  ENT: No nasal discharge; airway clear.   NECK: Supple; non tender. No meningeal signs.   CARD: S1, S2 normal; no murmurs, gallops, or rubs. Regular rate and rhythm.   RESP:  Clear to auscultation b/l, no wheezes, rales or rhonchi.  ABD: Normal bowel sounds; soft; non-distended; non-tender; no guarding/ rebound.  EXT: Normal ROM. No clubbing, cyanosis or edema. 2+ pulses to b/l ue/le.  NEURO: Alert, oriented, grossly unremarkable. CN II-XII intact. Finger to nose intact. No pronator drift. ambulatory. 5/5 strength in all extremities. Sensation equal and intact.   PSYCH: Cooperative, mood and affect appropriate.

## 2022-10-05 NOTE — PROGRESS NOTE ADULT - SUBJECTIVE AND OBJECTIVE BOX
HPI:  43 yo female no pmh presenting with headache x 1 week. Pt also complaining of intermittent left eye pain with blurry vision x 3 months. CTA reveals small volume of SAH in the right temporal/occipital sulci. CTH reveals focal areas of narrowing involving R A2 and L A3 segments suspicious for vasculitis and L pcomm infundibulum. Patient reports taking baby aspirin for the past 3 days for pain control, last taken on 10/4. No history of trauma. Pt denies dizziness, n/v, blurry vision, SOB, CP, n/v, localized weakness or numbness/tingling. NIHSS 0, HH2, MF1. (05 Oct 2022 15:07)    On admission, the patient was:  GCS:  Alonso-Sosa: 2  modified Nogueira: 1  ICH score:  NIHSS: 0    *** HIGH RISK OF DVT PRESENT ON ADMISSION ***    ICU Vital Signs Last 24 Hrs  T(C): 36.9 (05 Oct 2022 09:32), Max: 36.9 (05 Oct 2022 09:32)  T(F): 98.4 (05 Oct 2022 09:32), Max: 98.4 (05 Oct 2022 09:32)  HR: 66 (05 Oct 2022 15:12) (66 - 80)  BP: 108/66 (05 Oct 2022 15:12) (108/66 - 122/81)  RR: 17 (05 Oct 2022 15:12) (17 - 18)  SpO2: 99% (05 Oct 2022 15:12) (98% - 99%)    EXAMINATION:  General: Mild painful distress  HEENT: MMM  Neuro: Awake, alert, oriented x 3, follows commands, moves all extremities antigravity with some pain limitation  Cards: S1/S2, RRR  Respiratory: Clear, no wheeze  Abdomen: Soft, non-tender  Extremities: No edema  Skin:  Warm/dry      MEDICATIONS:  acetaminophen     Tablet .. 650 milliGRAM(s) Oral every 6 hours PRN  acetaminophen   IVPB .. 1000 milliGRAM(s) IV Intermittent once  bisacodyl 5 milliGRAM(s) Oral at bedtime  chlorhexidine 2% Cloths 1 Application(s) Topical daily  dextrose 5%. 1000 milliLiter(s) (50 mL/Hr) IV Continuous <Continuous>  dextrose 50% Injectable 25 Gram(s) IV Push once  dextrose Oral Gel 15 Gram(s) Oral once PRN  glucagon  Injectable 1 milliGRAM(s) IntraMuscular once  insulin lispro (ADMELOG) corrective regimen sliding scale   SubCutaneous Before meals and at bedtime  levETIRAcetam 500 milliGRAM(s) Oral two times a day  niMODipine 60 milliGRAM(s) Oral every 4 hours  ondansetron Injectable 4 milliGRAM(s) IV Push every 6 hours PRN  senna 2 Tablet(s) Oral at bedtime  sodium chloride 0.9%. 1000 milliLiter(s) (60 mL/Hr) IV Continuous <Continuous>      LABS:                      12.6   11.31 )-----------( 366      ( 05 Oct 2022 09:39 )             38.9     10-05    137  |  103  |  16  ----------------------------<  119<H>  3.9   |  23  |  0.57    Ca    8.8      05 Oct 2022 09:39    TPro  8.2  /  Alb  4.8  /  TBili  0.3  /  DBili  x   /  AST  18  /  ALT  28  /  AlkPhos  67  10-05    LIVER FUNCTIONS - ( 05 Oct 2022 09:39 )  Alb: 4.8 g/dL / Pro: 8.2 g/dL / ALK PHOS: 67 U/L / ALT: 28 U/L / AST: 18 U/L / GGT: x              HPI:  Patient is a 41 y/o female with no significant PMH, non smoker. She presents with headache x 1 week. Pt also complaining of intermittent left eye pain with blurry vision x 3 months. At times, she states that she is unable to lift the left eyelid.  In ED, CT head showed small volume of SAH in the right temporal/occipital sulci. CTA showed focal areas of narrowing involving R A2 and L A3 segments suspicious for vasculitis as well as L Pcomm infundibulum. Patient reports taking baby aspirin for the past 3 days for pain control, last taken 3 days ago. She denies history of trauma. Pt denies dizziness, n/v, blurry vision, SOB, CP, n/v, localized weakness or numbness/tingling. NIHSS 0, HH2, MF1. (05 Oct 2022 15:07)    On admission, the patient was:  GCS:  Alonso-Sosa: 2  modified Nogueira: 1  ICH score:  NIHSS: 0    *** HIGH RISK OF DVT PRESENT ON ADMISSION ***    ICU Vital Signs Last 24 Hrs  T(C): 36.9 (05 Oct 2022 09:32), Max: 36.9 (05 Oct 2022 09:32)  T(F): 98.4 (05 Oct 2022 09:32), Max: 98.4 (05 Oct 2022 09:32)  HR: 66 (05 Oct 2022 15:12) (66 - 80)  BP: 108/66 (05 Oct 2022 15:12) (108/66 - 122/81)  RR: 17 (05 Oct 2022 15:12) (17 - 18)  SpO2: 99% (05 Oct 2022 15:12) (98% - 99%)    EXAMINATION:  General: Mild painful distress  HEENT: MMM  Neuro: Awake, alert, oriented x 3, face symmetric, visual fields intact, follows commands, moves all extremities antigravity with some pain limitation  Cards: S1/S2, RRR  Respiratory: Clear, no wheeze  Abdomen: Soft, non-tender  Extremities: No edema  Skin:  Warm/dry      MEDICATIONS:  acetaminophen     Tablet .. 650 milliGRAM(s) Oral every 6 hours PRN  acetaminophen   IVPB .. 1000 milliGRAM(s) IV Intermittent once  bisacodyl 5 milliGRAM(s) Oral at bedtime  chlorhexidine 2% Cloths 1 Application(s) Topical daily  dextrose 5%. 1000 milliLiter(s) (50 mL/Hr) IV Continuous <Continuous>  dextrose 50% Injectable 25 Gram(s) IV Push once  dextrose Oral Gel 15 Gram(s) Oral once PRN  glucagon  Injectable 1 milliGRAM(s) IntraMuscular once  insulin lispro (ADMELOG) corrective regimen sliding scale   SubCutaneous Before meals and at bedtime  levETIRAcetam 500 milliGRAM(s) Oral two times a day  niMODipine 60 milliGRAM(s) Oral every 4 hours  ondansetron Injectable 4 milliGRAM(s) IV Push every 6 hours PRN  senna 2 Tablet(s) Oral at bedtime  sodium chloride 0.9%. 1000 milliLiter(s) (60 mL/Hr) IV Continuous <Continuous>      LABS:                      12.6   11.31 )-----------( 366      ( 05 Oct 2022 09:39 )             38.9     10-05    137  |  103  |  16  ----------------------------<  119<H>  3.9   |  23  |  0.57    Ca    8.8      05 Oct 2022 09:39    TPro  8.2  /  Alb  4.8  /  TBili  0.3  /  DBili  x   /  AST  18  /  ALT  28  /  AlkPhos  67  10-05    LIVER FUNCTIONS - ( 05 Oct 2022 09:39 )  Alb: 4.8 g/dL / Pro: 8.2 g/dL / ALK PHOS: 67 U/L / ALT: 28 U/L / AST: 18 U/L / GGT: x

## 2022-10-05 NOTE — H&P ADULT - ASSESSMENT
43 yo female no pmh presenting with headache x 1 week. CTH reveals small volume of SAH in the right temporal/occipital sulci. CTA reveals focal areas of narrowing involving R A2 and L A3 segments suspicious for vasculitis and L pcomm infundibulum. Admitted to ICU, pending cerebral angiogram. NIHSS 0, HH2, MF1.

## 2022-10-05 NOTE — H&P ADULT - PROBLEM SELECTOR PLAN 1
-admit to NSICU, Dr. D'Amico  -neuro/vitals q1hr  -pain control prn  -keppra 500mgbid  -nimodipine 60q4  -MRI brain  -cerebral angiogram  -stroke neuro consult  -d/w Dr. D'Amico

## 2022-10-05 NOTE — ED ADULT NURSE NOTE - CHIEF COMPLAINT QUOTE
[place] speaking, pt c/o frontal HA x 1 week, vomiting and intermittent subjective fevers. pt also states she has been on menstrual period x 10 days, usually last only 3 days.

## 2022-10-05 NOTE — ED ADULT NURSE NOTE - NSIMPLEMENTINTERV_GEN_ALL_ED
Implemented All Universal Safety Interventions:  [place] to call system. Call bell, personal items and telephone within reach. Instruct patient to call for assistance. Room bathroom lighting operational. Non-slip footwear when patient is off stretcher. Physically safe environment: no spills, clutter or unnecessary equipment. Stretcher in lowest position, wheels locked, appropriate side rails in place.

## 2022-10-05 NOTE — PATIENT PROFILE ADULT - PATIENT/CAREGIVER OFFERED  INTERPRETER SERVICES
yes Colchicine Counseling:  Patient counseled regarding adverse effects including but not limited to stomach upset (nausea, vomiting, stomach pain, or diarrhea). Patient instructed to limit alcohol consumption while taking this medication. Colchicine may reduce blood counts especially with prolonged use. The patient understands that monitoring of kidney function and blood counts may be required, especially at baseline. The patient verbalized understanding of the proper use and possible adverse effects of colchicine. All of the patient's questions and concerns were addressed. Hydroxyzine Pregnancy And Lactation Text: This medication is not safe during pregnancy and should not be taken. It is also excreted in breast milk and breast feeding isn't recommended. Benzoyl Peroxide Counseling: Patient counseled that medicine may cause skin irritation and bleach clothing. In the event of skin irritation, the patient was advised to reduce the amount of the drug applied or use it less frequently. The patient verbalized understanding of the proper use and possible adverse effects of benzoyl peroxide. All of the patient's questions and concerns were addressed. Taltz Pregnancy And Lactation Text: The risk during pregnancy and breastfeeding is uncertain with this medication. Solaraze Counseling:  I discussed with the patient the risks of Solaraze including but not limited to erythema, scaling, itching, weeping, crusting, and pain. Itraconazole Pregnancy And Lactation Text: This medication is Pregnancy Category C and it isn't know if it is safe during pregnancy. It is also excreted in breast milk. Bactrim Pregnancy And Lactation Text: This medication is Pregnancy Category D and is known to cause fetal risk. It is also excreted in breast milk. Dapsone Pregnancy And Lactation Text: This medication is Pregnancy Category C and is not considered safe during pregnancy or breast feeding. Methotrexate Pregnancy And Lactation Text: This medication is Pregnancy Category X and is known to cause fetal harm. This medication is excreted in breast milk. Minocycline Counseling: Patient advised regarding possible photosensitivity and discoloration of the teeth, skin, lips, tongue and gums. Patient instructed to avoid sunlight, if possible. When exposed to sunlight, patients should wear protective clothing, sunglasses, and sunscreen. The patient was instructed to call the office immediately if the following severe adverse effects occur:  hearing changes, easy bruising/bleeding, severe headache, or vision changes. The patient verbalized understanding of the proper use and possible adverse effects of minocycline. All of the patient's questions and concerns were addressed. Spironolactone Counseling: Patient advised regarding risks of diarrhea, abdominal pain, hyperkalemia, birth defects (for female patients), liver toxicity and renal toxicity. The patient may need blood work to monitor liver and kidney function and potassium levels while on therapy. The patient verbalized understanding of the proper use and possible adverse effects of spironolactone. All of the patient's questions and concerns were addressed. Enbrel Counseling:  I discussed with the patient the risks of etanercept including but not limited to myelosuppression, immunosuppression, autoimmune hepatitis, demyelinating diseases, lymphoma, and infections. The patient understands that monitoring is required including a PPD at baseline and must alert us or the primary physician if symptoms of infection or other concerning signs are noted. Imiquimod Counseling:  I discussed with the patient the risks of imiquimod including but not limited to erythema, scaling, itching, weeping, crusting, and pain. Patient understands that the inflammatory response to imiquimod is variable from person to person and was educated regarded proper titration schedule. If flu-like symptoms develop, patient knows to discontinue the medication and contact us. Clofazimine Counseling:  I discussed with the patient the risks of clofazimine including but not limited to skin and eye pigmentation, liver damage, nausea/vomiting, gastrointestinal bleeding and allergy. Topical Sulfur Applications Pregnancy And Lactation Text: This medication is Pregnancy Category C and has an unknown safety profile during pregnancy. It is unknown if this topical medication is excreted in breast milk. Topical Sulfur Applications Counseling: Topical Sulfur Counseling: Patient counseled that this medication may cause skin irritation or allergic reactions. In the event of skin irritation, the patient was advised to reduce the amount of the drug applied or use it less frequently. The patient verbalized understanding of the proper use and possible adverse effects of topical sulfur application. All of the patient's questions and concerns were addressed. Birth Control Pills Counseling: Birth Control Pill Counseling: I discussed with the patient the potential side effects of OCPs including but not limited to increased risk of stroke, heart attack, thrombophlebitis, deep venous thrombosis, hepatic adenomas, breast changes, GI upset, headaches, and depression. The patient verbalized understanding of the proper use and possible adverse effects of OCPs. All of the patient's questions and concerns were addressed. Terbinafine Pregnancy And Lactation Text: This medication is Pregnancy Category B and is considered safe during pregnancy. It is also excreted in breast milk and breast feeding isn't recommended. Oxybutynin Pregnancy And Lactation Text: This medication is Pregnancy Category B and is considered safe during pregnancy. It is unknown if it is excreted in breast milk. Albendazole Counseling:  I discussed with the patient the risks of albendazole including but not limited to cytopenia, kidney damage, nausea/vomiting and severe allergy. The patient understands that this medication is being used in an off-label manner. Azithromycin Pregnancy And Lactation Text: This medication is considered safe during pregnancy and is also secreted in breast milk. Xeljanz Counseling: Flor Field Counseling: I discussed with the patient the risks of Flor Field therapy including increased risk of infection, liver issues, headache, diarrhea, or cold symptoms. Live vaccines should be avoided. They were instructed to call if they have any problems. Griseofulvin Pregnancy And Lactation Text: This medication is Pregnancy Category X and is known to cause serious birth defects. It is unknown if this medication is excreted in breast milk but breast feeding should be avoided. Gabapentin Pregnancy And Lactation Text: This medication is Pregnancy Category C and isn't considered safe during pregnancy. It is excreted in breast milk. Doxepin Pregnancy And Lactation Text: This medication is Pregnancy Category C and it isn't known if it is safe during pregnancy. It is also excreted in breast milk and breast feeding isn't recommended. Otezla Counseling: Carnella Old Counseling: The side effects of Carnella Old were discussed with the patient, including but not limited to worsening or new depression, weight loss, diarrhea, nausea, upper respiratory tract infection, and headache. Patient instructed to call the office should any adverse effect occur. The patient verbalized understanding of the proper use and possible adverse effects of Otezla. All the patient's questions and concerns were addressed. Prednisone Counseling:  I discussed with the patient the risks of prolonged use of prednisone including but not limited to weight gain, insomnia, osteoporosis, mood changes, diabetes, susceptibility to infection, glaucoma and high blood pressure. In cases where prednisone use is prolonged, patients should be monitored with blood pressure checks, serum glucose levels and an eye exam.  Additionally, the patient may need to be placed on GI prophylaxis, PCP prophylaxis, and calcium and vitamin D supplementation and/or a bisphosphonate. The patient verbalized understanding of the proper use and the possible adverse effects of prednisone. All of the patient's questions and concerns were addressed. SSKI Counseling:  I discussed with the patient the risks of SSKI including but not limited to thyroid abnormalities, metallic taste, GI upset, fever, headache, acne, arthralgias, paraesthesias, lymphadenopathy, easy bleeding, arrhythmias, and allergic reaction. Acitretin Pregnancy And Lactation Text: This medication is Pregnancy Category X and should not be given to women who are pregnant or may become pregnant in the future. This medication is excreted in breast milk. Acitretin Counseling:  I discussed with the patient the risks of acitretin including but not limited to hair loss, dry lips/skin/eyes, liver damage, hyperlipidemia, depression/suicidal ideation, photosensitivity. Serious rare side effects can include but are not limited to pancreatitis, pseudotumor cerebri, bony changes, clot formation/stroke/heart attack. Patient understands that alcohol is contraindicated since it can result in liver toxicity and significantly prolong the elimination of the drug by many years. Carac Pregnancy And Lactation Text: This medication is Pregnancy Category X and contraindicated in pregnancy and in women who may become pregnant. It is unknown if this medication is excreted in breast milk. Rituxan Counseling:  I discussed with the patient the risks of Rituxan infusions. Side effects can include infusion reactions, severe drug rashes including mucocutaneous reactions, reactivation of latent hepatitis and other infections and rarely progressive multifocal leukoencephalopathy. All of the patient's questions and concerns were addressed. Fluconazole Counseling:  Patient counseled regarding adverse effects of fluconazole including but not limited to headache, diarrhea, nausea, upset stomach, liver function test abnormalities, taste disturbance, and stomach pain. There is a rare possibility of liver failure that can occur when taking fluconazole. The patient understands that monitoring of LFTs and kidney function test may be required, especially at baseline. The patient verbalized understanding of the proper use and possible adverse effects of fluconazole. All of the patient's questions and concerns were addressed. Arava Counseling:  Patient counseled regarding adverse effects of Arava including but not limited to nausea, vomiting, abnormalities in liver function tests. Patients may develop mouth sores, rash, diarrhea, and abnormalities in blood counts. The patient understands that monitoring is required including LFTs and blood counts. There is a rare possibility of scarring of the liver and lung problems that can occur when taking methotrexate. Persistent nausea, loss of appetite, pale stools, dark urine, cough, and shortness of breath should be reported immediately. Patient advised to discontinue Arava treatment and consult with a physician prior to attempting conception. The patient will have to undergo a treatment to eliminate Arava from the body prior to conception. Minoxidil Counseling: Minoxidil is a topical medication which can increase blood flow where it is applied. It is uncertain how this medication increases hair growth. Side effects are uncommon and include stinging and allergic reactions. Azathioprine Pregnancy And Lactation Text: This medication is Pregnancy Category D and isn't considered safe during pregnancy. It is unknown if this medication is excreted in breast milk. Stelara Pregnancy And Lactation Text: This medication is Pregnancy Category B and is considered safe during pregnancy. It is unknown if this medication is excreted in breast milk. Eucrisa Counseling: Patient may experience a mild burning sensation during topical application. Chris Daigle is not approved in children less than 3years of age. Protopic Pregnancy And Lactation Text: This medication is Pregnancy Category C. It is unknown if this medication is excreted in breast milk when applied topically. Stelara Counseling:  I discussed with the patient the risks of ustekinumab including but not limited to immunosuppression, malignancy, posterior leukoencephalopathy syndrome, and serious infections. The patient understands that monitoring is required including a PPD at baseline and must alert us or the primary physician if symptoms of infection or other concerning signs are noted. Siliq Counseling:  I discussed with the patient the risks of Siliq including but not limited to new or worsening depression, suicidal thoughts and behavior, immunosuppression, malignancy, posterior leukoencephalopathy syndrome, and serious infections. The patient understands that monitoring is required including a PPD at baseline and must alert us or the primary physician if symptoms of infection or other concerning signs are noted. There is also a special program designed to monitor depression which is required with Siliq. Xolair Pregnancy And Lactation Text: This medication is Pregnancy Category B and is considered safe during pregnancy. This medication is excreted in breast milk. Hydroxychloroquine Counseling:  I discussed with the patient that a baseline ophthalmologic exam is needed at the start of therapy and every year thereafter while on therapy. A CBC may also be warranted for monitoring. The side effects of this medication were discussed with the patient, including but not limited to agranulocytosis, aplastic anemia, seizures, rashes, retinopathy, and liver toxicity. Patient instructed to call the office should any adverse effect occur. The patient verbalized understanding of the proper use and possible adverse effects of Plaquenil. All the patient's questions and concerns were addressed. Cimetidine Counseling:  I discussed with the patient the risks of Cimetidine including but not limited to gynecomastia, headache, diarrhea, nausea, drowsiness, arrhythmias, pancreatitis, skin rashes, psychosis, bone marrow suppression and kidney toxicity. Gabapentin Counseling: I discussed with the patient the risks of gabapentin including but not limited to dizziness, somnolence, fatigue and ataxia. Taltz Counseling: I discussed with the patient the risks of ixekizumab including but not limited to immunosuppression, serious infections, worsening of inflammatory bowel disease and drug reactions. The patient understands that monitoring is required including a PPD at baseline and must alert us or the primary physician if symptoms of infection or other concerning signs are noted. Simponi Counseling:  I discussed with the patient the risks of golimumab including but not limited to myelosuppression, immunosuppression, autoimmune hepatitis, demyelinating diseases, lymphoma, and serious infections. The patient understands that monitoring is required including a PPD at baseline and must alert us or the primary physician if symptoms of infection or other concerning signs are noted. Rifampin Pregnancy And Lactation Text: This medication is Pregnancy Category C and it isn't know if it is safe during pregnancy. It is also excreted in breast milk and should not be used if you are breast feeding. Isotretinoin Counseling: Patient should get monthly blood tests, not donate blood, not drive at night if vision affected, not share medication, and not undergo elective surgery for 6 months after tx completed. Side effects reviewed, pt to contact office should one occur. Griseofulvin Counseling:  I discussed with the patient the risks of griseofulvin including but not limited to photosensitivity, cytopenia, liver damage, nausea/vomiting and severe allergy. The patient understands that this medication is best absorbed when taken with a fatty meal (e.g., ice cream or french fries). Benzoyl Peroxide Pregnancy And Lactation Text: This medication is Pregnancy Category C. It is unknown if benzoyl peroxide is excreted in breast milk. Glycopyrrolate Counseling:  I discussed with the patient the risks of glycopyrrolate including but not limited to skin rash, drowsiness, dry mouth, difficulty urinating, and blurred vision. Odomzo Pregnancy And Lactation Text: This medication is Pregnancy Category X and is absolutely contraindicated during pregnancy. It is unknown if it is excreted in breast milk. Drysol Pregnancy And Lactation Text: This medication is considered safe during pregnancy and breast feeding. Methotrexate Counseling:  Patient counseled regarding adverse effects of methotrexate including but not limited to nausea, vomiting, abnormalities in liver function tests. Patients may develop mouth sores, rash, diarrhea, and abnormalities in blood counts. The patient understands that monitoring is required including LFT's and blood counts. There is a rare possibility of scarring of the liver and lung problems that can occur when taking methotrexate. Persistent nausea, loss of appetite, pale stools, dark urine, cough, and shortness of breath should be reported immediately. Patient advised to discontinue methotrexate treatment at least three months before attempting to become pregnant. I discussed the need for folate supplements while taking methotrexate. These supplements can decrease side effects during methotrexate treatment. The patient verbalized understanding of the proper use and possible adverse effects of methotrexate. All of the patient's questions and concerns were addressed. Valtrex Pregnancy And Lactation Text: this medication is Pregnancy Category B and is considered safe during pregnancy. This medication is not directly found in breast milk but it's metabolite acyclovir is present. Valtrex Counseling: I discussed with the patient the risks of valacyclovir including but not limited to kidney damage, nausea, vomiting and severe allergy. The patient understands that if the infection seems to be worsening or is not improving, they are to call. Odomzo Counseling- I discussed with the patient the risks of Odomzo including but not limited to nausea, vomiting, diarrhea, constipation, weight loss, changes in the sense of taste, decreased appetite, muscle spasms, and hair loss. The patient verbalized understanding of the proper use and possible adverse effects of Odomzo. All of the patient's questions and concerns were addressed. Clindamycin Pregnancy And Lactation Text: This medication can be used in pregnancy if certain situations. Clindamycin is also present in breast milk. Quinolones Counseling:  I discussed with the patient the risks of fluoroquinolones including but not limited to GI upset, allergic reaction, drug rash, diarrhea, dizziness, photosensitivity, yeast infections, liver function test abnormalities, tendonitis/tendon rupture. Cosentyx Counseling:  I discussed with the patient the risks of Cosentyx including but not limited to worsening of Crohn's disease, immunosuppression, allergic reactions and infections. The patient understands that monitoring is required including a PPD at baseline and must alert us or the primary physician if symptoms of infection or other concerning signs are noted. Xelchariz Pregnancy And Lactation Text: This medication is Pregnancy Category D and is not considered safe during pregnancy. The risk during breast feeding is also uncertain. Elidel Counseling: Patient may experience a mild burning sensation during topical application. Elidel is not approved in children less than 3years of age. There have been case reports of hematologic and skin malignancies in patients using topical calcineurin inhibitors although causality is questionable. Topical Retinoid Pregnancy And Lactation Text: This medication is Pregnancy Category C. It is unknown if this medication is excreted in breast milk. Minocycline Pregnancy And Lactation Text: This medication is Pregnancy Category D and not consider safe during pregnancy. It is also excreted in breast milk. Isotretinoin Pregnancy And Lactation Text: This medication is Pregnancy Category X and is considered extremely dangerous during pregnancy. It is unknown if it is excreted in breast milk. Metronidazole Counseling:  I discussed with the patient the risks of metronidazole including but not limited to seizures, nausea/vomiting, a metallic taste in the mouth, nausea/vomiting and severe allergy. Cephalexin Pregnancy And Lactation Text: This medication is Pregnancy Category B and considered safe during pregnancy. It is also excreted in breast milk but can be used safely for shorter doses. Cyclophosphamide Pregnancy And Lactation Text: This medication is Pregnancy Category D and it isn't considered safe during pregnancy. This medication is excreted in breast milk. Use Enhanced Medication Counseling?: No Spironolactone Pregnancy And Lactation Text: This medication can cause feminization of the male fetus and should be avoided during pregnancy. The active metabolite is also found in breast milk. Hydroquinone Counseling:  Patient advised that medication may result in skin irritation, lightening (hypopigmentation), dryness, and burning. In the event of skin irritation, the patient was advised to reduce the amount of the drug applied or use it less frequently. Rarely, spots that are treated with hydroquinone can become darker (pseudoochronosis). Should this occur, patient instructed to stop medication and call the office. The patient verbalized understanding of the proper use and possible adverse effects of hydroquinone. All of the patient's questions and concerns were addressed. Metronidazole Pregnancy And Lactation Text: This medication is Pregnancy Category B and considered safe during pregnancy. It is also excreted in breast milk. Topical Clindamycin Counseling: Patient counseled that this medication may cause skin irritation or allergic reactions. In the event of skin irritation, the patient was advised to reduce the amount of the drug applied or use it less frequently. The patient verbalized understanding of the proper use and possible adverse effects of clindamycin. All of the patient's questions and concerns were addressed. Dapsone Counseling: I discussed with the patient the risks of dapsone including but not limited to hemolytic anemia, agranulocytosis, rashes, methemoglobinemia, kidney failure, peripheral neuropathy, headaches, GI upset, and liver toxicity. Patients who start dapsone require monitoring including baseline LFTs and weekly CBCs for the first month, then every month thereafter. The patient verbalized understanding of the proper use and possible adverse effects of dapsone. All of the patient's questions and concerns were addressed. Erythromycin Pregnancy And Lactation Text: This medication is Pregnancy Category B and is considered safe during pregnancy. It is also excreted in breast milk. Bactrim Counseling:  I discussed with the patient the risks of sulfa antibiotics including but not limited to GI upset, allergic reaction, drug rash, diarrhea, dizziness, photosensitivity, and yeast infections. Rarely, more serious reactions can occur including but not limited to aplastic anemia, agranulocytosis, methemoglobinemia, blood dyscrasias, liver or kidney failure, lung infiltrates or desquamative/blistering drug rashes. Humira Counseling:  I discussed with the patient the risks of adalimumab including but not limited to myelosuppression, immunosuppression, autoimmune hepatitis, demyelinating diseases, lymphoma, and serious infections. The patient understands that monitoring is required including a PPD at baseline and must alert us or the primary physician if symptoms of infection or other concerning signs are noted. Hydroquinone Pregnancy And Lactation Text: This medication has not been assigned a Pregnancy Risk Category but animal studies failed to show danger with the topical medication. It is unknown if the medication is excreted in breast milk. Doxycycline Pregnancy And Lactation Text: This medication is Pregnancy Category D and not consider safe during pregnancy. It is also excreted in breast milk but is considered safe for shorter treatment courses. Carac Counseling:  I discussed with the patient the risks of Carac including but not limited to erythema, scaling, itching, weeping, crusting, and pain. Oxybutynin Counseling:  I discussed with the patient the risks of oxybutynin including but not limited to skin rash, drowsiness, dry mouth, difficulty urinating, and blurred vision. Hydroxychloroquine Pregnancy And Lactation Text: This medication has been shown to cause fetal harm but it isn't assigned a Pregnancy Risk Category. There are small amounts excreted in breast milk. Ketoconazole Pregnancy And Lactation Text: This medication is Pregnancy Category C and it isn't know if it is safe during pregnancy. It is also excreted in breast milk and breast feeding isn't recommended. Drysol Counseling:  I discussed with the patient the risks of drysol/aluminum chloride including but not limited to skin rash, itching, irritation, burning. Hydroxyzine Counseling: Patient advised that the medication is sedating and not to drive a car after taking this medication. Patient informed of potential adverse effects including but not limited to dry mouth, urinary retention, and blurry vision. The patient verbalized understanding of the proper use and possible adverse effects of hydroxyzine. All of the patient's questions and concerns were addressed. Dupixent Counseling: I discussed with the patient the risks of dupilumab including but not limited to eye infection and irritation, cold sores, injection site reactions, worsening of asthma, allergic reactions and increased risk of parasitic infection. Live vaccines should be avoided while taking dupilumab. Dupilumab will also interact with certain medications such as warfarin and cyclosporine. The patient understands that monitoring is required and they must alert us or the primary physician if symptoms of infection or other concerning signs are noted. Tazorac Counseling:  Patient advised that medication is irritating and drying. Patient may need to apply sparingly and wash off after an hour before eventually leaving it on overnight. The patient verbalized understanding of the proper use and possible adverse effects of tazorac. All of the patient's questions and concerns were addressed. Ketoconazole Counseling:   Patient counseled regarding improving absorption with orange juice. Adverse effects include but are not limited to breast enlargement, headache, diarrhea, nausea, upset stomach, liver function test abnormalities, taste disturbance, and stomach pain. There is a rare possibility of liver failure that can occur when taking ketoconazole. The patient understands that monitoring of LFTs may be required, especially at baseline. The patient verbalized understanding of the proper use and possible adverse effects of ketoconazole. All of the patient's questions and concerns were addressed. 5-Fu Counseling: 5-Fluorouracil Counseling:  I discussed with the patient the risks of 5-fluorouracil including but not limited to erythema, scaling, itching, weeping, crusting, and pain. Ivermectin Counseling:  Patient instructed to take medication on an empty stomach with a full glass of water. Patient informed of potential adverse effects including but not limited to nausea, diarrhea, dizziness, itching, and swelling of the extremities or lymph nodes. The patient verbalized understanding of the proper use and possible adverse effects of ivermectin. All of the patient's questions and concerns were addressed. Erivedge Counseling- I discussed with the patient the risks of Erivedge including but not limited to nausea, vomiting, diarrhea, constipation, weight loss, changes in the sense of taste, decreased appetite, muscle spasms, and hair loss. The patient verbalized understanding of the proper use and possible adverse effects of Erivedge. All of the patient's questions and concerns were addressed. Topical Retinoid counseling:  Patient advised to apply a pea-sized amount only at bedtime and wait 30 minutes after washing their face before applying. If too drying, patient may add a non-comedogenic moisturizer. The patient verbalized understanding of the proper use and possible adverse effects of retinoids. All of the patient's questions and concerns were addressed. Thalidomide Counseling: I discussed with the patient the risks of thalidomide including but not limited to birth defects, anxiety, weakness, chest pain, dizziness, cough and severe allergy. Azithromycin Counseling:  I discussed with the patient the risks of azithromycin including but not limited to GI upset, allergic reaction, drug rash, diarrhea, and yeast infections. Solaraze Pregnancy And Lactation Text: This medication is Pregnancy Category B and is considered safe. There is some data to suggest avoiding during the third trimester. It is unknown if this medication is excreted in breast milk. Doxepin Counseling:  Patient advised that the medication is sedating and not to drive a car after taking this medication. Patient informed of potential adverse effects including but not limited to dry mouth, urinary retention, and blurry vision. The patient verbalized understanding of the proper use and possible adverse effects of doxepin. All of the patient's questions and concerns were addressed. Cyclosporine Counseling:  I discussed with the patient the risks of cyclosporine including but not limited to hypertension, gingival hyperplasia,myelosuppression, immunosuppression, liver damage, kidney damage, neurotoxicity, lymphoma, and serious infections. The patient understands that monitoring is required including baseline blood pressure, CBC, CMP, lipid panel and uric acid, and then 1-2 times monthly CMP and blood pressure. Doxycycline Counseling:  Patient counseled regarding possible photosensitivity and increased risk for sunburn. Patient instructed to avoid sunlight, if possible. When exposed to sunlight, patients should wear protective clothing, sunglasses, and sunscreen. The patient was instructed to call the office immediately if the following severe adverse effects occur:  hearing changes, easy bruising/bleeding, severe headache, or vision changes. The patient verbalized understanding of the proper use and possible adverse effects of doxycycline. All of the patient's questions and concerns were addressed. Protopic Counseling: Patient may experience a mild burning sensation during topical application. Protopic is not approved in children less than 3years of age. There have been case reports of hematologic and skin malignancies in patients using topical calcineurin inhibitors although causality is questionable. Otezla Pregnancy And Lactation Text: This medication is Pregnancy Category C and it isn't known if it is safe during pregnancy. It is unknown if it is excreted in breast milk. Cimzia Pregnancy And Lactation Text: This medication crosses the placenta but can be considered safe in certain situations. Cimzia may be excreted in breast milk. Nsaids Pregnancy And Lactation Text: These medications are considered safe up to 30 weeks gestation. It is excreted in breast milk. Zyclara Counseling:  I discussed with the patient the risks of imiquimod including but not limited to erythema, scaling, itching, weeping, crusting, and pain. Patient understands that the inflammatory response to imiquimod is variable from person to person and was educated regarded proper titration schedule. If flu-like symptoms develop, patient knows to discontinue the medication and contact us. Rifampin Counseling: I discussed with the patient the risks of rifampin including but not limited to liver damage, kidney damage, red-orange body fluids, nausea/vomiting and severe allergy. Terbinafine Counseling: Patient counseling regarding adverse effects of terbinafine including but not limited to headache, diarrhea, rash, upset stomach, liver function test abnormalities, itching, taste/smell disturbance, nausea, abdominal pain, and flatulence. There is a rare possibility of liver failure that can occur when taking terbinafine. The patient understands that a baseline LFT and kidney function test may be required. The patient verbalized understanding of the proper use and possible adverse effects of terbinafine. All of the patient's questions and concerns were addressed. Itraconazole Counseling:  I discussed with the patient the risks of itraconazole including but not limited to liver damage, nausea/vomiting, neuropathy, and severe allergy. The patient understands that this medication is best absorbed when taken with acidic beverages such as non-diet cola or ginger ale. The patient understands that monitoring is required including baseline LFTs and repeat LFTs at intervals. The patient understands that they are to contact us or the primary physician if concerning signs are noted. Dupixent Pregnancy And Lactation Text: This medication likely crosses the placenta but the risk for the fetus is uncertain. This medication is excreted in breast milk. Albendazole Pregnancy And Lactation Text: This medication is Pregnancy Category C and it isn't known if it is safe during pregnancy. It is also excreted in breast milk. Glycopyrrolate Pregnancy And Lactation Text: This medication is Pregnancy Category B and is considered safe during pregnancy. It is unknown if it is excreted breast milk. Bexarotene Pregnancy And Lactation Text: This medication is Pregnancy Category X and should not be given to women who are pregnant or may become pregnant. This medication should not be used if you are breast feeding. Cellcept Counseling:  I discussed with the patient the risks of mycophenolate mofetil including but not limited to infection/immunosuppression, GI upset, hypokalemia, hypercholesterolemia, bone marrow suppression, lymphoproliferative disorders, malignancy, GI ulceration/bleed/perforation, colitis, interstitial lung disease, kidney failure, progressive multifocal leukoencephalopathy, and birth defects. The patient understands that monitoring is required including a baseline creatinine and regular CBC testing. In addition, patient must alert us immediately if symptoms of infection or other concerning signs are noted. Cyclophosphamide Counseling:  I discussed with the patient the risks of cyclophosphamide including but not limited to hair loss, hormonal abnormalities, decreased fertility, abdominal pain, diarrhea, nausea and vomiting, bone marrow suppression and infection. The patient understands that monitoring is required while taking this medication. Tazorac Pregnancy And Lactation Text: This medication is not safe during pregnancy. It is unknown if this medication is excreted in breast milk. Tetracycline Counseling: Patient counseled regarding possible photosensitivity and increased risk for sunburn. Patient instructed to avoid sunlight, if possible. When exposed to sunlight, patients should wear protective clothing, sunglasses, and sunscreen. The patient was instructed to call the office immediately if the following severe adverse effects occur:  hearing changes, easy bruising/bleeding, severe headache, or vision changes. The patient verbalized understanding of the proper use and possible adverse effects of tetracycline. All of the patient's questions and concerns were addressed. Patient understands to avoid pregnancy while on therapy due to potential birth defects. Nsaids Counseling: NSAID Counseling: I discussed with the patient that NSAIDs should be taken with food. Prolonged use of NSAIDs can result in the development of stomach ulcers. Patient advised to stop taking NSAIDs if abdominal pain occurs. The patient verbalized understanding of the proper use and possible adverse effects of NSAIDs. All of the patient's questions and concerns were addressed. Azathioprine Counseling:  I discussed with the patient the risks of azathioprine including but not limited to myelosuppression, immunosuppression, hepatotoxicity, lymphoma, and infections. The patient understands that monitoring is required including baseline LFTs, Creatinine, possible TPMP genotyping and weekly CBCs for the first month and then every 2 weeks thereafter. The patient verbalized understanding of the proper use and possible adverse effects of azathioprine. All of the patient's questions and concerns were addressed. Cyclosporine Pregnancy And Lactation Text: This medication is Pregnancy Category C and it isn't know if it is safe during pregnancy. This medication is excreted in breast milk. Erythromycin Counseling:  I discussed with the patient the risks of erythromycin including but not limited to GI upset, allergic reaction, drug rash, diarrhea, increase in liver enzymes, and yeast infections. Picato Counseling:  I discussed with the patient the risks of Picato including but not limited to erythema, scaling, itching, weeping, crusting, and pain. Bexarotene Counseling:  I discussed with the patient the risks of bexarotene including but not limited to hair loss, dry lips/skin/eyes, liver abnormalities, hyperlipidemia, pancreatitis, depression/suicidal ideation, photosensitivity, drug rash/allergic reactions, hypothyroidism, anemia, leukopenia, infection, cataracts, and teratogenicity. Patient understands that they will need regular blood tests to check lipid profile, liver function tests, white blood cell count, thyroid function tests and pregnancy test if applicable. High Dose Vitamin A Counseling: Side effects reviewed, pt to contact office should one occur. Sski Pregnancy And Lactation Text: This medication is Pregnancy Category D and isn't considered safe during pregnancy. It is excreted in breast milk. Clindamycin Counseling: I counseled the patient regarding use of clindamycin as an antibiotic for prophylactic and/or therapeutic purposes. Clindamycin is active against numerous classes of bacteria, including skin bacteria. Side effects may include nausea, diarrhea, gastrointestinal upset, rash, hives, yeast infections, and in rare cases, colitis. Birth Control Pills Pregnancy And Lactation Text: This medication should be avoided if pregnant and for the first 30 days post-partum. High Dose Vitamin A Pregnancy And Lactation Text: High dose vitamin A therapy is contraindicated during pregnancy and breast feeding. Rituxan Pregnancy And Lactation Text: This medication is Pregnancy Category C and it isn't know if it is safe during pregnancy. It is unknown if this medication is excreted in breast milk but similar antibodies are known to be excreted. Cimzia Counseling:  I discussed with the patient the risks of Cimzia including but not limited to immunosuppression, allergic reactions and infections. The patient understands that monitoring is required including a PPD at baseline and must alert us or the primary physician if symptoms of infection or other concerning signs are noted. Infliximab Counseling:  I discussed with the patient the risks of infliximab including but not limited to myelosuppression, immunosuppression, autoimmune hepatitis, demyelinating diseases, lymphoma, and serious infections. The patient understands that monitoring is required including a PPD at baseline and must alert us or the primary physician if symptoms of infection or other concerning signs are noted. Cephalexin Counseling: I counseled the patient regarding use of cephalexin as an antibiotic for prophylactic and/or therapeutic purposes. Cephalexin (commonly prescribed under brand name Keflex) is a cephalosporin antibiotic which is active against numerous classes of bacteria, including most skin bacteria. Side effects may include nausea, diarrhea, gastrointestinal upset, rash, hives, yeast infections, and in rare cases, hepatitis, kidney disease, seizures, fever, confusion, neurologic symptoms, and others. Patients with severe allergies to penicillin medications are cautioned that there is about a 10% incidence of cross-reactivity with cephalosporins. When possible, patients with penicillin allergies should use alternatives to cephalosporins for antibiotic therapy. Detail Level: Simple Xolair Counseling:  Patient informed of potential adverse effects including but not limited to fever, muscle aches, rash and allergic reactions. The patient verbalized understanding of the proper use and possible adverse effects of Xolair. All of the patient's questions and concerns were addressed. Tremfya Counseling: I discussed with the patient the risks of guselkumab including but not limited to immunosuppression, serious infections, worsening of inflammatory bowel disease and drug reactions. The patient understands that monitoring is required including a PPD at baseline and must alert us or the primary physician if symptoms of infection or other concerning signs are noted.

## 2022-10-05 NOTE — ED ADULT TRIAGE NOTE - CHIEF COMPLAINT QUOTE
Sammarinese speaking, pt c/o frontal HA x 1 week, vomiting and intermittent subjective fevers. pt also states she has been on menstrual period x 10 days, usually last only 3 days.

## 2022-10-05 NOTE — ED PROVIDER NOTE - NSTIMEPROVIDERCAREINITIATE_GEN_ER
05-Oct-2022 10:05
I have personally seen and examined this patient.  I have fully participated in the care of this patient. I have reviewed all pertinent clinical information, including history, physical exam, plan and the Resident’s note and agree except as noted.

## 2022-10-06 ENCOUNTER — TRANSCRIPTION ENCOUNTER (OUTPATIENT)
Age: 42
End: 2022-10-06

## 2022-10-06 ENCOUNTER — APPOINTMENT (OUTPATIENT)
Dept: NEUROSURGERY | Facility: HOSPITAL | Age: 42
End: 2022-10-06

## 2022-10-06 LAB
ANION GAP SERPL CALC-SCNC: 7 MMOL/L — SIGNIFICANT CHANGE UP (ref 5–17)
AUTO DIFF PNL BLD: NEGATIVE — SIGNIFICANT CHANGE UP
BLD GP AB SCN SERPL QL: NEGATIVE — SIGNIFICANT CHANGE UP
BUN SERPL-MCNC: 11 MG/DL — SIGNIFICANT CHANGE UP (ref 7–23)
C-ANCA SER-ACNC: NEGATIVE — SIGNIFICANT CHANGE UP
C3 SERPL-MCNC: 113 MG/DL — SIGNIFICANT CHANGE UP (ref 81–157)
C4 SERPL-MCNC: 21 MG/DL — SIGNIFICANT CHANGE UP (ref 13–39)
CALCIUM SERPL-MCNC: 8.5 MG/DL — SIGNIFICANT CHANGE UP (ref 8.4–10.5)
CHLORIDE SERPL-SCNC: 108 MMOL/L — SIGNIFICANT CHANGE UP (ref 96–108)
CO2 SERPL-SCNC: 24 MMOL/L — SIGNIFICANT CHANGE UP (ref 22–31)
CREAT SERPL-MCNC: 0.61 MG/DL — SIGNIFICANT CHANGE UP (ref 0.5–1.3)
DSDNA AB FLD-ACNC: <0.2 AI — SIGNIFICANT CHANGE UP
EGFR: 114 ML/MIN/1.73M2 — SIGNIFICANT CHANGE UP
ENA SS-A AB FLD IA-ACNC: <0.2 AI — SIGNIFICANT CHANGE UP
GLUCOSE BLDC GLUCOMTR-MCNC: 106 MG/DL — HIGH (ref 70–99)
GLUCOSE BLDC GLUCOMTR-MCNC: 109 MG/DL — HIGH (ref 70–99)
GLUCOSE BLDC GLUCOMTR-MCNC: 120 MG/DL — HIGH (ref 70–99)
GLUCOSE BLDC GLUCOMTR-MCNC: 88 MG/DL — SIGNIFICANT CHANGE UP (ref 70–99)
GLUCOSE SERPL-MCNC: 95 MG/DL — SIGNIFICANT CHANGE UP (ref 70–99)
HBV CORE AB SER-ACNC: SIGNIFICANT CHANGE UP
HBV CORE IGM SER-ACNC: SIGNIFICANT CHANGE UP
HBV SURFACE AB SER-ACNC: SIGNIFICANT CHANGE UP
HBV SURFACE AG SER-ACNC: SIGNIFICANT CHANGE UP
HCT VFR BLD CALC: 34.8 % — SIGNIFICANT CHANGE UP (ref 34.5–45)
HCV AB S/CO SERPL IA: 0.04 S/CO — SIGNIFICANT CHANGE UP
HCV AB S/CO SERPL IA: 0.04 S/CO — SIGNIFICANT CHANGE UP
HCV AB SERPL-IMP: SIGNIFICANT CHANGE UP
HCV AB SERPL-IMP: SIGNIFICANT CHANGE UP
HGB BLD-MCNC: 11.4 G/DL — LOW (ref 11.5–15.5)
IGA FLD-MCNC: 239 MG/DL — SIGNIFICANT CHANGE UP (ref 84–499)
IGG FLD-MCNC: 1350 MG/DL — SIGNIFICANT CHANGE UP (ref 610–1660)
IGM SERPL-MCNC: 172 MG/DL — SIGNIFICANT CHANGE UP (ref 35–242)
KAPPA LC SER QL IFE: 2.11 MG/DL — HIGH (ref 0.33–1.94)
KAPPA/LAMBDA FREE LIGHT CHAIN RATIO, SERUM: 1.21 RATIO — SIGNIFICANT CHANGE UP (ref 0.26–1.65)
LAMBDA LC SER QL IFE: 1.74 MG/DL — SIGNIFICANT CHANGE UP (ref 0.57–2.63)
MAGNESIUM SERPL-MCNC: 2.3 MG/DL — SIGNIFICANT CHANGE UP (ref 1.6–2.6)
MCHC RBC-ENTMCNC: 28.9 PG — SIGNIFICANT CHANGE UP (ref 27–34)
MCHC RBC-ENTMCNC: 32.8 GM/DL — SIGNIFICANT CHANGE UP (ref 32–36)
MCV RBC AUTO: 88.1 FL — SIGNIFICANT CHANGE UP (ref 80–100)
MPO AB + PR3 PNL SER: SIGNIFICANT CHANGE UP
NRBC # BLD: 0 /100 WBCS — SIGNIFICANT CHANGE UP (ref 0–0)
P-ANCA SER-ACNC: NEGATIVE — SIGNIFICANT CHANGE UP
PHOSPHATE SERPL-MCNC: 3.4 MG/DL — SIGNIFICANT CHANGE UP (ref 2.5–4.5)
PLATELET # BLD AUTO: 325 K/UL — SIGNIFICANT CHANGE UP (ref 150–400)
POTASSIUM SERPL-MCNC: 4.3 MMOL/L — SIGNIFICANT CHANGE UP (ref 3.5–5.3)
POTASSIUM SERPL-SCNC: 4.3 MMOL/L — SIGNIFICANT CHANGE UP (ref 3.5–5.3)
RBC # BLD: 3.95 M/UL — SIGNIFICANT CHANGE UP (ref 3.8–5.2)
RBC # FLD: 13.1 % — SIGNIFICANT CHANGE UP (ref 10.3–14.5)
RH IG SCN BLD-IMP: POSITIVE — SIGNIFICANT CHANGE UP
SODIUM SERPL-SCNC: 139 MMOL/L — SIGNIFICANT CHANGE UP (ref 135–145)
WBC # BLD: 7.7 K/UL — SIGNIFICANT CHANGE UP (ref 3.8–10.5)
WBC # FLD AUTO: 7.7 K/UL — SIGNIFICANT CHANGE UP (ref 3.8–10.5)

## 2022-10-06 PROCEDURE — 36226 PLACE CATH VERTEBRAL ART: CPT | Mod: 50

## 2022-10-06 PROCEDURE — 93970 EXTREMITY STUDY: CPT | Mod: 26

## 2022-10-06 PROCEDURE — 36224 PLACE CATH CAROTD ART: CPT | Mod: 50

## 2022-10-06 PROCEDURE — 93306 TTE W/DOPPLER COMPLETE: CPT | Mod: 26

## 2022-10-06 PROCEDURE — 99232 SBSQ HOSP IP/OBS MODERATE 35: CPT

## 2022-10-06 PROCEDURE — 36227 PLACE CATH XTRNL CAROTID: CPT

## 2022-10-06 RX ORDER — MAGNESIUM SULFATE 500 MG/ML
2 VIAL (ML) INJECTION ONCE
Refills: 0 | Status: COMPLETED | OUTPATIENT
Start: 2022-10-06 | End: 2022-10-06

## 2022-10-06 RX ORDER — VALPROIC ACID (AS SODIUM SALT) 250 MG/5ML
500 SOLUTION, ORAL ORAL EVERY 8 HOURS
Refills: 0 | Status: DISCONTINUED | OUTPATIENT
Start: 2022-10-06 | End: 2022-10-08

## 2022-10-06 RX ORDER — TRAMADOL HYDROCHLORIDE 50 MG/1
100 TABLET ORAL EVERY 6 HOURS
Refills: 0 | Status: DISCONTINUED | OUTPATIENT
Start: 2022-10-06 | End: 2022-10-08

## 2022-10-06 RX ORDER — SODIUM CHLORIDE 9 MG/ML
1000 INJECTION INTRAMUSCULAR; INTRAVENOUS; SUBCUTANEOUS
Refills: 0 | Status: DISCONTINUED | OUTPATIENT
Start: 2022-10-06 | End: 2022-10-06

## 2022-10-06 RX ORDER — METOCLOPRAMIDE HCL 10 MG
10 TABLET ORAL EVERY 6 HOURS
Refills: 0 | Status: DISCONTINUED | OUTPATIENT
Start: 2022-10-06 | End: 2022-10-08

## 2022-10-06 RX ORDER — SODIUM CHLORIDE 9 MG/ML
1000 INJECTION, SOLUTION INTRAVENOUS
Refills: 0 | Status: DISCONTINUED | OUTPATIENT
Start: 2022-10-06 | End: 2022-10-06

## 2022-10-06 RX ADMIN — CHLORHEXIDINE GLUCONATE 1 APPLICATION(S): 213 SOLUTION TOPICAL at 11:44

## 2022-10-06 RX ADMIN — TRAMADOL HYDROCHLORIDE 100 MILLIGRAM(S): 50 TABLET ORAL at 10:41

## 2022-10-06 RX ADMIN — Medication 2 CAPSULE(S): at 23:23

## 2022-10-06 RX ADMIN — TRAMADOL HYDROCHLORIDE 100 MILLIGRAM(S): 50 TABLET ORAL at 19:07

## 2022-10-06 RX ADMIN — Medication 2 CAPSULE(S): at 16:51

## 2022-10-06 RX ADMIN — Medication 10 MILLIGRAM(S): at 16:51

## 2022-10-06 RX ADMIN — Medication 55 MILLIGRAM(S): at 10:42

## 2022-10-06 RX ADMIN — OXYCODONE HYDROCHLORIDE 5 MILLIGRAM(S): 5 TABLET ORAL at 09:00

## 2022-10-06 RX ADMIN — OXYCODONE HYDROCHLORIDE 10 MILLIGRAM(S): 5 TABLET ORAL at 07:15

## 2022-10-06 RX ADMIN — Medication 55 MILLIGRAM(S): at 19:07

## 2022-10-06 RX ADMIN — OXYCODONE HYDROCHLORIDE 10 MILLIGRAM(S): 5 TABLET ORAL at 06:18

## 2022-10-06 RX ADMIN — SENNA PLUS 2 TABLET(S): 8.6 TABLET ORAL at 21:16

## 2022-10-06 RX ADMIN — TRAMADOL HYDROCHLORIDE 100 MILLIGRAM(S): 50 TABLET ORAL at 21:08

## 2022-10-06 RX ADMIN — Medication 2 CAPSULE(S): at 00:00

## 2022-10-06 RX ADMIN — Medication 10 MILLIGRAM(S): at 10:41

## 2022-10-06 RX ADMIN — NIMODIPINE 60 MILLIGRAM(S): 60 SOLUTION ORAL at 06:18

## 2022-10-06 RX ADMIN — LEVETIRACETAM 500 MILLIGRAM(S): 250 TABLET, FILM COATED ORAL at 06:19

## 2022-10-06 RX ADMIN — SODIUM CHLORIDE 60 MILLILITER(S): 9 INJECTION INTRAMUSCULAR; INTRAVENOUS; SUBCUTANEOUS at 07:16

## 2022-10-06 RX ADMIN — OXYCODONE HYDROCHLORIDE 5 MILLIGRAM(S): 5 TABLET ORAL at 08:58

## 2022-10-06 RX ADMIN — NIMODIPINE 60 MILLIGRAM(S): 60 SOLUTION ORAL at 10:42

## 2022-10-06 RX ADMIN — Medication 5 MILLIGRAM(S): at 21:16

## 2022-10-06 RX ADMIN — OXYCODONE HYDROCHLORIDE 5 MILLIGRAM(S): 5 TABLET ORAL at 06:10

## 2022-10-06 RX ADMIN — OXYCODONE AND ACETAMINOPHEN 1 TABLET(S): 5; 325 TABLET ORAL at 06:10

## 2022-10-06 RX ADMIN — TRAMADOL HYDROCHLORIDE 100 MILLIGRAM(S): 50 TABLET ORAL at 11:15

## 2022-10-06 RX ADMIN — Medication 10 MILLIGRAM(S): at 21:16

## 2022-10-06 RX ADMIN — Medication 25 GRAM(S): at 10:41

## 2022-10-06 NOTE — PROGRESS NOTE ADULT - SUBJECTIVE AND OBJECTIVE BOX
HPI: 41 yo female no pmh presenting with headache x 1 week. Pt also complaining of intermittent left eye pain with blurry vision x 3 months. CTA reveals small volume of SAH in the right temporal/occipital sulci. CTH reveals focal areas of narrowing involving R A2 and L A3 segments suspicious for vasculitis and L pcomm infundibulum. Patient reports taking baby aspirin for the past 3 days for pain control, last taken on 10/4. No history of trauma. NIHSS 0, HH2, MF1. Patient admitted to NSICU for close monitoring and further workup.     Stroke neurology consulted for further assistance with vasculitis workup. Upon more questioning, pt states she hit her head against a sofa ~20 days ago but denies any headache after headstrike. Pt endorsing pain behind her eyes up to her eyebrows that started ~3 months ago, but was dull in nature. Pt explains that her headache started after having a coughing fit 5 days ago. Pt denies having frequent headaches. Denies any family history of strokes or ICH. Pt c/o severe headache which worsens upon speaking or participating in exam. Pt denies weakness/numbness of extremities.     T(C): 36.4 (10-06-22 @ 09:00), Max: 37.1 (10-05-22 @ 16:00)  HR: 69 (10-06-22 @ 10:00) (48 - 80)  BP: 121/67 (10-06-22 @ 10:00) (91/52 - 129/65)  RR: 16 (10-06-22 @ 10:00) (14 - 18)  SpO2: 99% (10-06-22 @ 10:00) (95% - 99%)    PAST MEDICAL & SURGICAL HISTORY:  Subarachnoid hemorrhage, nontraumatic      LABS:     Complements normal  CRP normal  ESR 20  SSa and SSb neg  ANCA negative  Hep B and C negative  A1c 5.5%                         11.4   7.70  )-----------( 325      ( 06 Oct 2022 05:30 )             34.8     10-06    139  |  108  |  11  ----------------------------<  95  4.3   |  24  |  0.61    Ca    8.5      06 Oct 2022 05:30  Phos  3.4     10-06  Mg     2.3     10-    TPro  8.2  /  Alb  4.8  /  TBili  0.3  /  DBili  x   /  AST  18  /  ALT  28  /  Alk Phos  67  10-    PT/INR - ( 05 Oct 2022 12:40 )   PT: 13.1 sec;   INR: 1.10          PTT - ( 05 Oct 2022 12:40 )  PTT:32.6 sec  CARDIAC MARKERS ( 05 Oct 2022 16:49 )  x     / 0.01 ng/mL / x     / x     / x        Urinalysis Basic - ( 05 Oct 2022 09:39 )    Color: Yellow / Appearance: Clear / S.020 / pH: x  Gluc: x / Ketone: NEGATIVE  / Bili: Negative / Urobili: 0.2 E.U./dL   Blood: x / Protein: NEGATIVE mg/dL / Nitrite: NEGATIVE   Leuk Esterase: NEGATIVE / RBC: > 10 /HPF / WBC < 5 /HPF   Sq Epi: x / Non Sq Epi: 0-5 /HPF / Bacteria: Present /HPF      RADIOLOGY & ADDITIONAL STUDIES:    < from: CT Head No Cont (10.05.22 @ 18:54) >  Impression: Small amount of right posterior temporal parietal occipital   subarachnoid hemorrhage, unchanged. No new acute hemorrhage is seen.    < end of copied text >    US DVT negative    FINDINGS:    Left Ventricle:  The left ventricle is normal in size, wall thickness, and systolic   function with a calculated ejection fraction of 65-70%. There are no   regional wall motion abnormalities seen. There is normal left ventricular   diastolic function and filling pressure.    Right Ventricle:  The right ventricle is normal in size. Right ventricular systolic   function is normal. The tricuspid annular plane systolic excursion   (TAPSE) is 20.70 mm (normal >=17 mm). RV tissue Doppler S' is 12.20 cm/s   (normal >10 cm/s).    Left Atrium:  The left atrium is normal in size. Left atrial volume index (JUD) is   27.2 ml/m².    Right Atrium:  The right atrium is normal in size.    Aortic Valve:  Fibrocalcific tricuspid aortic valve without significant stenosis. There   is mild aortic regurgitation.    Mitral Valve:  Structurally normal mitral valve with normal leaflet excursion. There is   trace mitral regurgitation.    Tricuspid Valve:  Structurally normal tricuspid valve with normal leaflet excursion. There   is trace tricuspid regurgitation. There is no echocardiographic evidence   of pulmonary hypertension, pulmonary artery systolic pressure is 27 mmHg.    Inferior Vena Cava:  The inferior vena cava is normal in size (<2.1cm) with abnormal   inspiratory collapse (<50%) consistent with mildly elevated right atrial   pressure (  8, range 5-10mmHg).    Pulmonic Valve:  Structurally normal pulmonic valve with normal leaflet excursion. There   is trace pulmonic regurgitation.    Aorta:  The aortic root is normal in size. The aortic arch is normal without   evidence of aortic coarctation.    Pericardium:  No pericardial effusion is seen.    ---------------------------------------------------------------------------  -----  Summer Yip MD    Electronically signed by Summer Yip MD  Signature Date/Time: 10/6/2022/10:51:35 AM   HPI: 41 yo female no pmh presenting with headache x 1 week. Pt also complaining of intermittent left eye pain with blurry vision x 3 months. CTA reveals small volume of SAH in the right temporal/occipital sulci. CTH reveals focal areas of narrowing involving R A2 and L A3 segments suspicious for vasculitis and L pcomm infundibulum. Patient reports taking baby aspirin for the past 3 days for pain control, last taken on 10/4. No history of trauma. NIHSS 0, HH2, MF1. Patient admitted to NSICU for close monitoring and further workup.     Stroke neurology consulted for further assistance with vasculitis workup. Upon more questioning, pt states she hit her head against a sofa ~20 days ago but denies any headache after headstrike. Pt endorsing pain behind her eyes up to her eyebrows that started ~3 months ago, but was dull in nature. Pt explains that her headache started after having a coughing fit 5 days ago. Pt denies having frequent headaches. Denies any family history of strokes or ICH. Pt c/o severe headache which worsens upon speaking or participating in exam. Pt denies weakness/numbness of extremities.     Patient seen following angiogram, Phone Translation ID#[id number] for interview  Patient reports headache worsened following cough about five days ago    Patient without history of SLE or RA, without family history of SLE or RA  Patient is originally from [facility]  No history of rashes  No photosensitivity  No oral ulcers  No Raynauds symptoms  No paresthesias  No alopecia  No fevers, chills  No chest pain or shortness of breath  No joint pain, stiffness,or swelling, sometimes has pain on the right foot  No muscle weakness       T(C): 36.4 (10-06-22 @ 09:00), Max: 37.1 (10-05-22 @ 16:00)  HR: 69 (10-06-22 @ 10:00) (48 - 80)  BP: 121/67 (10-06-22 @ 10:00) (91/52 - 129/65)  RR: 16 (10-06-22 @ 10:00) (14 - 18)  SpO2: 99% (10-06-22 @ 10:00) (95% - 99%)    PAST MEDICAL & SURGICAL HISTORY:  Subarachnoid hemorrhage, nontraumatic        LABS: 10/6/2022     Complements normal  CRP normal  ESR 20  SSa and SSb neg  ANCA negative  Hep B and C negative  A1c 5.5%    Heart Rate  Heart Rate Heart Rate (beats/min): 68 /min    Arterial BP  ART Systolic Systolic: 124 mm Hg  ART Diastolic Diastolic (mm Hg): 67 mm Hg  ABP Mean Mean (mm Hg): 89 mm Hg    Respiratory/Pulse Oximetry/Oxygen Therapy  Respiration Rate (breaths/min) Respiration Rate (breaths/min): 18 /min  SpO2 (%) SpO2 (%): 99 %  O2 Delivery/Oxygen Delivery Method Patient On (Oxygen Delivery Method): room air  Patient comfortable  No malar rash, no alopecia  No oral ulcers  No synovitis  No peripheral edema  peripheral pulses intact  No joint tenderness  Unable to assess muscle strength as recently returned from angiogram      ECG  ECG Rhythm: normal sinus rhythm             11.4   7.70  )-----------( 325      ( 06 Oct 2022 05:30 )             34.8     10-    139  |  108  |  11  ----------------------------<  95  4.3   |  24  |  0.61    Ca    8.5      06 Oct 2022 05:30  Phos  3.4     10-06  Mg     2.3     10-06    TPro  8.2  /  Alb  4.8  /  TBili  0.3  /  DBili  x   /  AST  18  /  ALT  28  /  Alk Phos  67  10-05    PT/INR - ( 05 Oct 2022 12:40 )   PT: 13.1 sec;   INR: 1.10          PTT - ( 05 Oct 2022 12:40 )  PTT:32.6 sec  CARDIAC MARKERS ( 05 Oct 2022 16:49 )  x     / 0.01 ng/mL / x     / x     / x        Urinalysis Basic - ( 05 Oct 2022 09:39 )    Color: Yellow / Appearance: Clear / S.020 / pH: x  Gluc: x / Ketone: NEGATIVE  / Bili: Negative / Urobili: 0.2 E.U./dL   Blood: x / Protein: NEGATIVE mg/dL / Nitrite: NEGATIVE   Leuk Esterase: NEGATIVE / RBC: > 10 /HPF / WBC < 5 /HPF   Sq Epi: x / Non Sq Epi: 0-5 /HPF / Bacteria: Present /HPF      RADIOLOGY & ADDITIONAL STUDIES:    < from: CT Head No Cont (10.05.22 @ 18:54) >  Impression: Small amount of right posterior temporal parietal occipital   subarachnoid hemorrhage, unchanged. No new acute hemorrhage is seen.    < end of copied text >    US DVT negative    FINDINGS:    Left Ventricle:  The left ventricle is normal in size, wall thickness, and systolic   function with a calculated ejection fraction of 65-70%. There are no   regional wall motion abnormalities seen. There is normal left ventricular   diastolic function and filling pressure.    Right Ventricle:  The right ventricle is normal in size. Right ventricular systolic   function is normal. The tricuspid annular plane systolic excursion   (TAPSE) is 20.70 mm (normal >=17 mm). RV tissue Doppler S' is 12.20 cm/s   (normal >10 cm/s).    Left Atrium:  The left atrium is normal in size. Left atrial volume index (JUD) is   27.2 ml/m².    Right Atrium:  The right atrium is normal in size.    Aortic Valve:  Fibrocalcific tricuspid aortic valve without significant stenosis. There   is mild aortic regurgitation.    Mitral Valve:  Structurally normal mitral valve with normal leaflet excursion. There is   trace mitral regurgitation.    Tricuspid Valve:  Structurally normal tricuspid valve with normal leaflet excursion. There   is trace tricuspid regurgitation. There is no echocardiographic evidence   of pulmonary hypertension, pulmonary artery systolic pressure is 27 mmHg.    Inferior Vena Cava:  The inferior vena cava is normal in size (<2.1cm) with abnormal   inspiratory collapse (<50%) consistent with mildly elevated right atrial   pressure (  8, range 5-10mmHg).    Pulmonic Valve:  Structurally normal pulmonic valve with normal leaflet excursion. There   is trace pulmonic regurgitation.    Aorta:  The aortic root is normal in size. The aortic arch is normal without   evidence of aortic coarctation.    Pericardium:  No pericardial effusion is seen.    ---------------------------------------------------------------------------  -----  Summer Yip MD    Electronically signed by Summer Yip MD  Signature Date/Time: 10/6/2022/10:51:35 AM

## 2022-10-06 NOTE — PROGRESS NOTE ADULT - ASSESSMENT
42 year old woman with no significant past medical history presenting with headache x 1 week. CT head revealed small volume of SAH in the right temporal/occipital sulci. CTA reveals focal areas of narrowing involving R A2 and L A3 segments and L pcomm infundibulum. Admitted to ICU, imaging results raising the question of possible vasculitis, s/p cerebral angiogram today results pending.  At this time, patient without other signs or symptoms of an underlying systemic vasculitis or connective tissue disease, however given imaging results, awaiting cerebral angiogram results for better evaluation for possible vasculitis.  ESR 20 and CRP normal with normal complements, less likely related to active CNS lupus however further serologies (and angiogram results) pending including BILL, anti DSDNA, anti pan, anti RNP, please add ribosomal P antibody as well.  ANCA negative, ANCA associated vasculitis less likely as well.  Appreciate neurology consultation, possible RCVS pending angiogram results.  Further management pending results, will continue to follow.

## 2022-10-06 NOTE — CONSULT NOTE ADULT - SUBJECTIVE AND OBJECTIVE BOX
**STROKE CONSULT NOTE**     ID [id number]    HPI: 43 yo female no pmh presenting with headache x 1 week. Pt also complaining of intermittent left eye pain with blurry vision x 3 months. CTA reveals small volume of SAH in the right temporal/occipital sulci. CTH reveals focal areas of narrowing involving R A2 and L A3 segments suspicious for vasculitis and L pcomm infundibulum. Patient reports taking baby aspirin for the past 3 days for pain control, last taken on 10/4. No history of trauma. NIHSS 0, HH2, MF1. Patient admitted to NSICU for close monitoring and further workup.     Stroke neurology consulted for further assistance with vasculitis workup. Upon more questioning, pt states she hit her head against a sofa ~20 days ago but denies any headache after headstrike. Pt endorsing pain behind her eyes up to her eyebrows that started ~3 months ago, but was dull in nature. Pt explains that her headache started after having a coughing fit 5 days ago. Pt denies having frequent headaches. Denies any family history of strokes or ICH. Pt c/o severe headache which worsens upon speaking or participating in exam. Pt denies weakness/numbness of extremities.           T(C): 36.4 (10-06-22 @ 09:00), Max: 37.1 (10-05-22 @ 16:00)  HR: 69 (10-06-22 @ 10:00) (48 - 80)  BP: 121/67 (10-06-22 @ 10:00) (91/52 - 129/65)  RR: 16 (10-06-22 @ 10:00) (14 - 18)  SpO2: 99% (10-06-22 @ 10:00) (95% - 99%)    PAST MEDICAL & SURGICAL HISTORY:  Subarachnoid hemorrhage, nontraumatic    FAMILY HISTORY:    SOCIAL HISTORY:   Patient lives with *** at ***.   Smoking status:  Drinking:  Drug Use:     ROS: ***  Constitutional: No fever, weight loss or fatigue  Eyes: No eye pain, visual disturbances, or discharge  ENMT:  No difficulty hearing, tinnitus; No sinus or throat pain  Neck: No pain or stiffness  Respiratory: No cough, wheezing, chills or hemoptysis  Cardiovascular: No chest pain, palpitations, shortness of breath, or leg swelling  Gastrointestinal: No abdominal pain. No nausea, vomiting or hematemesis; No diarrhea or constipation. Nohematochezia.  Genitourinary: No dysuria, frequency, hematuria or incontinence  Neurological: As per HPI  Skin: No itching, burning, rashes or lesions   Endocrine: No heat or cold intolerance; No hair loss  Musculoskeletal: No joint pain or swelling; No muscle, back or extremity pain  Heme/Lymph: No easy bruising or bleeding gums    MEDICATIONS  (STANDING):  acetaminophen 300 mG/butalbital 50 mG/ caffeine 40 mG 2 Capsule(s) Oral every 8 hours  bisacodyl 5 milliGRAM(s) Oral at bedtime  chlorhexidine 2% Cloths 1 Application(s) Topical daily  dextrose 5% + sodium chloride 0.9%. 1000 milliLiter(s) (60 mL/Hr) IV Continuous <Continuous>  dextrose 5%. 1000 milliLiter(s) (50 mL/Hr) IV Continuous <Continuous>  insulin lispro (ADMELOG) corrective regimen sliding scale   SubCutaneous Before meals and at bedtime  metoclopramide Injectable 10 milliGRAM(s) IV Push every 6 hours  niMODipine 60 milliGRAM(s) Oral every 4 hours  senna 2 Tablet(s) Oral at bedtime  traMADol 100 milliGRAM(s) Oral every 6 hours  valproate sodium  IVPB 500 milliGRAM(s) IV Intermittent every 8 hours    MEDICATIONS  (PRN):  acetaminophen     Tablet .. 650 milliGRAM(s) Oral every 6 hours PRN Temp greater or equal to 38C (100.4F), Mild Pain (1 - 3)  ondansetron Injectable 4 milliGRAM(s) IV Push every 6 hours PRN Nausea and/or Vomiting  oxyCODONE    IR 5 milliGRAM(s) Oral every 6 hours PRN Moderate Pain (4 - 6)  oxyCODONE    IR 10 milliGRAM(s) Oral every 6 hours PRN Severe Pain (7 - 10)    Allergies    No Known Allergies    Intolerances      Vital Signs Last 24 Hrs  T(C): 36.4 (06 Oct 2022 09:00), Max: 37.1 (05 Oct 2022 16:00)  T(F): 97.5 (06 Oct 2022 09:00), Max: 98.8 (05 Oct 2022 16:00)  HR: 69 (06 Oct 2022 10:00) (48 - 80)  BP: 121/67 (06 Oct 2022 10:00) (91/52 - 129/65)  BP(mean): 88 (06 Oct 2022 10:00) (66 - 90)  RR: 16 (06 Oct 2022 10:00) (14 - 18)  SpO2: 99% (06 Oct 2022 10:00) (95% - 99%)    Parameters below as of 06 Oct 2022 10:00  Patient On (Oxygen Delivery Method): room air        Physical exam:  Constitutional: No acute distress, conversant  Eyes: Anicteric sclerae, moist conjunctivae, see below for CNs  Neck: trachea midline, FROM, supple, no thyromegaly or lymphadenopathy  Cardiovascular: Regular rate and rhythm, no murmurs, rubs, or gallops. No carotid bruits.   Pulmonary: Anterior breath sounds clear bilaterally, no crackles or wheezing. No use of accessory muscles  GI: Abdomen soft, non-distended, non-tender  Extremities: Radial and DP pulses +2, no edema    Neurologic:  -Mental status: Awake, alert, oriented to person, place, and time. Speech is fluent with intact naming, repetition, and comprehension, no dysarthria. Recent and remote memory intact. Follows commands. Attention/concentration intact. Fund of knowledge appropriate.  -Cranial nerves:   II: Visual fields are full to confrontation.  III, IV, VI: Extraocular movements are intact without nystagmus. Pupils equally round and reactive to light  V:  Facial sensation V1-V3 equal and intact   VII: Face is symmetric with normal eye closure and smile  VIII: Hearing is bilaterally intact to finger rub  IX, X: Uvula is midline and soft palate rises symmetrically  XI: Head turning and shoulder shrug are intact.  XII: Tongue protrudes midline  Motor: Normal bulk and tone. No pronator drift. Strength bilateral upper extremity 5/5, bilateral lower extremities 5/5.  Rapid alternating movements intact and symmetric  Sensation: Intact to light touch bilaterally. No neglect or extinction on double simultaneous testing.  Coordination: No dysmetria on finger-to-nose and heel-to-shin bilaterally  Reflexes: Downgoing toes bilaterally   Gait: Narrow gait and steady    NIHSS: **** ASPECT Score: ***** ICH Score: ****** (GCS)    Fingerstick Blood Glucose: CAPILLARY BLOOD GLUCOSE      POCT Blood Glucose.: 88 mg/dL (06 Oct 2022 06:24)    LABS:                        11.4   7.70  )-----------( 325      ( 06 Oct 2022 05:30 )             34.8     10-    139  |  108  |  11  ----------------------------<  95  4.3   |  24  |  0.61    Ca    8.5      06 Oct 2022 05:30  Phos  3.4     10-  Mg     2.3     10-    TPro  8.2  /  Alb  4.8  /  TBili  0.3  /  DBili  x   /  AST  18  /  ALT  28  /  AlkPhos  67  10-05    PT/INR - ( 05 Oct 2022 12:40 )   PT: 13.1 sec;   INR: 1.10          PTT - ( 05 Oct 2022 12:40 )  PTT:32.6 sec  CARDIAC MARKERS ( 05 Oct 2022 16:49 )  x     / 0.01 ng/mL / x     / x     / x          Urinalysis Basic - ( 05 Oct 2022 09:39 )    Color: Yellow / Appearance: Clear / S.020 / pH: x  Gluc: x / Ketone: NEGATIVE  / Bili: Negative / Urobili: 0.2 E.U./dL   Blood: x / Protein: NEGATIVE mg/dL / Nitrite: NEGATIVE   Leuk Esterase: NEGATIVE / RBC: > 10 /HPF / WBC < 5 /HPF   Sq Epi: x / Non Sq Epi: 0-5 /HPF / Bacteria: Present /HPF        RADIOLOGY & ADDITIONAL STUDIES:      -----------------------------------------------------------------------------------------------------------------  IV-tPA (Y/N):    ***                              Bolus time:    Alteplase Dose Verification w/ RN:  Reason IV-tPA not given: ***    **STROKE CONSULT NOTE**     ID [id number]    HPI: 41 yo female no pmh presenting with headache x 1 week. Pt also complaining of intermittent left eye pain with blurry vision x 3 months. CTA reveals small volume of SAH in the right temporal/occipital sulci. CTH reveals focal areas of narrowing involving R A2 and L A3 segments suspicious for vasculitis and L pcomm infundibulum. Patient reports taking baby aspirin for the past 3 days for pain control, last taken on 10/4. No history of trauma. NIHSS 0, HH2, MF1. Patient admitted to NSICU for close monitoring and further workup.     Stroke neurology consulted for further assistance with vasculitis workup. Upon more questioning, pt states she hit her head against a sofa ~20 days ago but denies any headache after headstrike. Pt endorsing pain behind her eyes up to her eyebrows that started ~3 months ago, but was dull in nature. Pt explains that her headache started after having a coughing fit 5 days ago. Pt denies having frequent headaches. Denies any family history of strokes or ICH. Pt c/o severe headache which worsens upon speaking or participating in exam. Pt denies weakness/numbness of extremities.     T(C): 36.4 (10-06-22 @ 09:00), Max: 37.1 (10-05-22 @ 16:00)  HR: 69 (10-06-22 @ 10:00) (48 - 80)  BP: 121/67 (10-06-22 @ 10:00) (91/52 - 129/65)  RR: 16 (10-06-22 @ 10:00) (14 - 18)  SpO2: 99% (10-06-22 @ 10:00) (95% - 99%)    PAST MEDICAL & SURGICAL HISTORY:  Subarachnoid hemorrhage, nontraumatic    FAMILY HISTORY:    SOCIAL HISTORY:   Smoking status:  Drinking:  Drug Use:     ROS:   Constitutional: No fever, weight loss or fatigue  Eyes: No eye pain, visual disturbances, or discharge  ENMT:  No difficulty hearing, tinnitus; No sinus or throat pain  Neck: No pain or stiffness  Respiratory: No cough, wheezing, chills or hemoptysis  Cardiovascular: No chest pain, palpitations, shortness of breath,   Gastrointestinal: No abdominal pain. No nausea, vomiting   Genitourinary: No dysuria, frequency, incontinence  Neurological: As per HPI    MEDICATIONS  (STANDING):  acetaminophen 300 mG/butalbital 50 mG/ caffeine 40 mG 2 Capsule(s) Oral every 8 hours  bisacodyl 5 milliGRAM(s) Oral at bedtime  chlorhexidine 2% Cloths 1 Application(s) Topical daily  dextrose 5% + sodium chloride 0.9%. 1000 milliLiter(s) (60 mL/Hr) IV Continuous <Continuous>  dextrose 5%. 1000 milliLiter(s) (50 mL/Hr) IV Continuous <Continuous>  insulin lispro (ADMELOG) corrective regimen sliding scale   SubCutaneous Before meals and at bedtime  metoclopramide Injectable 10 milliGRAM(s) IV Push every 6 hours  niMODipine 60 milliGRAM(s) Oral every 4 hours  senna 2 Tablet(s) Oral at bedtime  traMADol 100 milliGRAM(s) Oral every 6 hours  valproate sodium  IVPB 500 milliGRAM(s) IV Intermittent every 8 hours    MEDICATIONS  (PRN):  acetaminophen     Tablet .. 650 milliGRAM(s) Oral every 6 hours PRN Temp greater or equal to 38C (100.4F), Mild Pain (1 - 3)  ondansetron Injectable 4 milliGRAM(s) IV Push every 6 hours PRN Nausea and/or Vomiting  oxyCODONE    IR 5 milliGRAM(s) Oral every 6 hours PRN Moderate Pain (4 - 6)  oxyCODONE    IR 10 milliGRAM(s) Oral every 6 hours PRN Severe Pain (7 - 10)    Allergies    No Known Allergies    Intolerances    Vital Signs Last 24 Hrs  T(C): 36.4 (06 Oct 2022 09:00), Max: 37.1 (05 Oct 2022 16:00)  T(F): 97.5 (06 Oct 2022 09:00), Max: 98.8 (05 Oct 2022 16:00)  HR: 69 (06 Oct 2022 10:00) (48 - 80)  BP: 121/67 (06 Oct 2022 10:00) (91/52 - 129/65)  BP(mean): 88 (06 Oct 2022 10:00) (66 - 90)  RR: 16 (06 Oct 2022 10:00) (14 - 18)  SpO2: 99% (06 Oct 2022 10:00) (95% - 99%)    Parameters below as of 06 Oct 2022 10:00  Patient On (Oxygen Delivery Method): room air    Physical exam:  Constitutional: No acute distress, conversant    Neurologic:  -Mental status: Awake, alert, oriented to person, place, and time. Speech is fluent with intact naming, repetition, and comprehension, no dysarthria. Recent and remote memory intact. Follows commands. Attention/concentration intact. Fund of knowledge appropriate.  -Cranial nerves:   II: Visual fields are full to confrontation.  III, IV, VI: Extraocular movements are intact without nystagmus. Pupils equally round and reactive to light  V:  Facial sensation V1-V3 equal and intact   VII: Face is symmetric with normal eye closure and smile  VIII: Hearing is grossly intact   XII: Tongue protrudes midline  Motor: Normal bulk and tone. Was non participatory in motor exam as it worsened headache    Sensation: Intact to light touch bilaterally. No neglect or extinction on double simultaneous testing.  Coordination: Was non participatory as it worsened headache    Reflexes: Downgoing toes bilaterally     NIHSS: 0     Fingerstick Blood Glucose: CAPILLARY BLOOD GLUCOSE      POCT Blood Glucose.: 88 mg/dL (06 Oct 2022 06:24)    LABS:                        11.4   7.70  )-----------( 325      ( 06 Oct 2022 05:30 )             34.8     10-06    139  |  108  |  11  ----------------------------<  95  4.3   |  24  |  0.61    Ca    8.5      06 Oct 2022 05:30  Phos  3.4     10-06  Mg     2.3     10-06    TPro  8.2  /  Alb  4.8  /  TBili  0.3  /  DBili  x   /  AST  18  /  ALT  28  /  AlkPhos  67  10-05    PT/INR - ( 05 Oct 2022 12:40 )   PT: 13.1 sec;   INR: 1.10          PTT - ( 05 Oct 2022 12:40 )  PTT:32.6 sec  CARDIAC MARKERS ( 05 Oct 2022 16:49 )  x     / 0.01 ng/mL / x     / x     / x        Urinalysis Basic - ( 05 Oct 2022 09:39 )    Color: Yellow / Appearance: Clear / S.020 / pH: x  Gluc: x / Ketone: NEGATIVE  / Bili: Negative / Urobili: 0.2 E.U./dL   Blood: x / Protein: NEGATIVE mg/dL / Nitrite: NEGATIVE   Leuk Esterase: NEGATIVE / RBC: > 10 /HPF / WBC < 5 /HPF   Sq Epi: x / Non Sq Epi: 0-5 /HPF / Bacteria: Present /HPF      RADIOLOGY & ADDITIONAL STUDIES:    < from: CT Head No Cont (10.05.22 @ 18:54) >  Impression: Small amount of right posterior temporal parietal occipital   subarachnoid hemorrhage, unchanged. No new acute hemorrhage is seen.    < end of copied text >

## 2022-10-06 NOTE — PROGRESS NOTE ADULT - SUBJECTIVE AND OBJECTIVE BOX
HPI:  Patient is a 41 y/o female with no significant PMH, non smoker. She presents with headache x 1 week. Pt also complaining of intermittent left eye pain with blurry vision x 3 months. At times, she states that she is unable to lift the left eyelid.  In ED, CT head showed small volume of SAH in the right temporal/occipital sulci. CTA showed focal areas of narrowing involving R A2 and L A3 segments suspicious for vasculitis as well as L Pcomm infundibulum. Patient reports taking baby aspirin for the past 3 days for pain control, last taken 3 days ago. She denies history of trauma. Pt denies dizziness, n/v, blurry vision, SOB, CP, n/v, localized weakness or numbness/tingling. NIHSS 0, HH2, MF1. (05 Oct 2022 15:07)    On admission, the patient was:  GCS:  Alonso-Sosa: 2  modified Nogueira: 1  ICH score:  NIHSS: 0    *** HIGH RISK OF DVT PRESENT ON ADMISSION ***    ICU Vital Signs Last 24 Hrs  T(C): 36.8 (06 Oct 2022 05:07), Max: 37.1 (05 Oct 2022 16:00)  T(F): 98.3 (06 Oct 2022 05:07), Max: 98.8 (05 Oct 2022 16:00)  HR: 61 (06 Oct 2022 07:00) (48 - 80)  BP: 119/56 (06 Oct 2022 06:00) (91/52 - 129/65)  BP(mean): 80 (06 Oct 2022 06:00) (66 - 90)  RR: 16 (06 Oct 2022 05:00) (14 - 18)  SpO2: 98% (06 Oct 2022 07:00) (95% - 99%)      10-05-22 @ 07:01  -  10-06-22 @ 07:00  --------------------------------------------------------  IN: 780 mL / OUT: 950 mL / NET: -170 mL      EXAMINATION:  General: Calm  HEENT: MMM  Neuro: Awake, alert, oriented x 3, face symmetric, visual fields intact, follows commands, moves all extremities antigravity with some pain limitation  Cards: S1/S2, RRR  Respiratory: Clear, no wheeze  Abdomen: Soft, non-tender  Extremities: No edema  Skin:  Warm/dry      MEDICATIONS:  acetaminophen     Tablet .. 650 milliGRAM(s) Oral every 6 hours PRN  bisacodyl 5 milliGRAM(s) Oral at bedtime  chlorhexidine 2% Cloths 1 Application(s) Topical daily  dextrose 5%. 1000 milliLiter(s) (50 mL/Hr) IV Continuous <Continuous>  insulin lispro (ADMELOG) corrective regimen sliding scale   SubCutaneous Before meals and at bedtime  levETIRAcetam 500 milliGRAM(s) Oral two times a day  niMODipine 60 milliGRAM(s) Oral every 4 hours  ondansetron Injectable 4 milliGRAM(s) IV Push every 6 hours PRN  oxyCODONE    IR 5 milliGRAM(s) Oral every 6 hours PRN  oxyCODONE    IR 10 milliGRAM(s) Oral every 6 hours PRN  senna 2 Tablet(s) Oral at bedtime  sodium chloride 0.9%. 1000 milliLiter(s) (60 mL/Hr) IV Continuous <Continuous>    LABS:                      11.4   7.70  )-----------( 325      ( 06 Oct 2022 05:30 )             34.8     10-06    139  |  108  |  11  ----------------------------<  95  4.3   |  24  |  0.61    Ca    8.5      06 Oct 2022 05:30  Phos  3.4     10-06  Mg     2.3     10-06    TPro  8.2  /  Alb  4.8  /  TBili  0.3  /  DBili  x   /  AST  18  /  ALT  28  /  AlkPhos  67  10-05    LIVER FUNCTIONS - ( 05 Oct 2022 09:39 )  Alb: 4.8 g/dL / Pro: 8.2 g/dL / ALK PHOS: 67 U/L / ALT: 28 U/L / AST: 18 U/L / GGT: x                HPI:  Patient is a 41 y/o female with no significant PMH, non smoker. She presents with headache x 1 week. Pt also complaining of intermittent left eye pain with blurry vision x 3 months. At times, she states that she is unable to lift the left eyelid.  In ED, CT head showed small volume of SAH in the right temporal/occipital sulci. CTA showed focal areas of narrowing involving R A2 and L A3 segments suspicious for vasculitis as well as L Pcomm infundibulum. Patient reports taking baby aspirin for the past 3 days for pain control, last taken 3 days ago. She denies history of trauma. Pt denies dizziness, n/v, blurry vision, SOB, CP, n/v, localized weakness or numbness/tingling. NIHSS 0, HH2, MF1. (05 Oct 2022 15:07)    On admission, the patient was:  GCS:  Alonso-Sosa: 2  modified Nogueira: 1  ICH score:  NIHSS: 0    *** HIGH RISK OF DVT PRESENT ON ADMISSION ***    ICU Vital Signs Last 24 Hrs  T(C): 36.8 (06 Oct 2022 05:07), Max: 37.1 (05 Oct 2022 16:00)  T(F): 98.3 (06 Oct 2022 05:07), Max: 98.8 (05 Oct 2022 16:00)  HR: 61 (06 Oct 2022 07:00) (48 - 80)  BP: 119/56 (06 Oct 2022 06:00) (91/52 - 129/65)  BP(mean): 80 (06 Oct 2022 06:00) (66 - 90)  RR: 16 (06 Oct 2022 05:00) (14 - 18)  SpO2: 98% (06 Oct 2022 07:00) (95% - 99%)      10-05-22 @ 07:01  -  10-06-22 @ 07:00  --------------------------------------------------------  IN: 780 mL / OUT: 950 mL / NET: -170 mL      EXAMINATION: Examined in [place] [id number]  General: Calm  HEENT: MMM  Neuro: Awake, alert, oriented x 3, face symmetric, visual fields intact, follows commands, moves all extremities antigravity with some pain limitation  Cards: S1/S2, RRR  Respiratory: Clear, no wheeze  Abdomen: Soft, non-tender  Extremities: No edema  Skin:  Warm/dry      MEDICATIONS:  acetaminophen     Tablet .. 650 milliGRAM(s) Oral every 6 hours PRN  bisacodyl 5 milliGRAM(s) Oral at bedtime  chlorhexidine 2% Cloths 1 Application(s) Topical daily  dextrose 5%. 1000 milliLiter(s) (50 mL/Hr) IV Continuous <Continuous>  insulin lispro (ADMELOG) corrective regimen sliding scale   SubCutaneous Before meals and at bedtime  levETIRAcetam 500 milliGRAM(s) Oral two times a day  niMODipine 60 milliGRAM(s) Oral every 4 hours  ondansetron Injectable 4 milliGRAM(s) IV Push every 6 hours PRN  oxyCODONE    IR 5 milliGRAM(s) Oral every 6 hours PRN  oxyCODONE    IR 10 milliGRAM(s) Oral every 6 hours PRN  senna 2 Tablet(s) Oral at bedtime  sodium chloride 0.9%. 1000 milliLiter(s) (60 mL/Hr) IV Continuous <Continuous>    LABS:                      11.4   7.70  )-----------( 325      ( 06 Oct 2022 05:30 )             34.8     10-06    139  |  108  |  11  ----------------------------<  95  4.3   |  24  |  0.61    Ca    8.5      06 Oct 2022 05:30  Phos  3.4     10-06  Mg     2.3     10-06    TPro  8.2  /  Alb  4.8  /  TBili  0.3  /  DBili  x   /  AST  18  /  ALT  28  /  AlkPhos  67  10-05    LIVER FUNCTIONS - ( 05 Oct 2022 09:39 )  Alb: 4.8 g/dL / Pro: 8.2 g/dL / ALK PHOS: 67 U/L / ALT: 28 U/L / AST: 18 U/L / GGT: x                HPI:  Patient is a 43 y/o female with no significant PMH, non smoker. She presents with headache x 1 week. Pt also complaining of intermittent left eye pain with blurry vision x 3 months. At times, she states that she is unable to lift the left eyelid.  She denies head trauma this week and describes the HA as dull. No thunderclap onset. She does endorse hitting her head against a sofa almost 3 weeks ago but did not have headache following that incident.  In ED, CT head showed small volume of SAH in the right temporal/occipital sulci. CTA showed focal areas of narrowing involving R A2 and L A3 segments suspicious for vasculitis as well as L Pcomm infundibulum. Patient reports taking baby aspirin for the past 3 days for pain control, last taken 3 days ago. She denies history of trauma. Pt denies dizziness, n/v, blurry vision, SOB, CP, n/v, localized weakness or numbness/tingling. NIHSS 0, HH2, MF1. (05 Oct 2022 15:07)    On admission, the patient was:  GCS:  Laonso-Sosa: 2  modified Nogueira: 1  ICH score:  NIHSS: 0    *** HIGH RISK OF DVT PRESENT ON ADMISSION ***    ICU Vital Signs Last 24 Hrs  T(C): 36.8 (06 Oct 2022 05:07), Max: 37.1 (05 Oct 2022 16:00)  T(F): 98.3 (06 Oct 2022 05:07), Max: 98.8 (05 Oct 2022 16:00)  HR: 61 (06 Oct 2022 07:00) (48 - 80)  BP: 119/56 (06 Oct 2022 06:00) (91/52 - 129/65)  BP(mean): 80 (06 Oct 2022 06:00) (66 - 90)  RR: 16 (06 Oct 2022 05:00) (14 - 18)  SpO2: 98% (06 Oct 2022 07:00) (95% - 99%)      10-05-22 @ 07:01  -  10-06-22 @ 07:00  --------------------------------------------------------  IN: 780 mL / OUT: 950 mL / NET: -170 mL      EXAMINATION: Examined in [place] [id number]  General: Calm  HEENT: MMM  Neuro: Awake, alert, oriented x 3, face symmetric, visual fields intact, follows commands, moves all extremities antigravity with some pain limitation  Cards: S1/S2, RRR  Respiratory: Clear, no wheeze  Abdomen: Soft, non-tender  Extremities: No edema  Skin:  Warm/dry      MEDICATIONS:  acetaminophen     Tablet .. 650 milliGRAM(s) Oral every 6 hours PRN  bisacodyl 5 milliGRAM(s) Oral at bedtime  chlorhexidine 2% Cloths 1 Application(s) Topical daily  dextrose 5%. 1000 milliLiter(s) (50 mL/Hr) IV Continuous <Continuous>  insulin lispro (ADMELOG) corrective regimen sliding scale   SubCutaneous Before meals and at bedtime  levETIRAcetam 500 milliGRAM(s) Oral two times a day  niMODipine 60 milliGRAM(s) Oral every 4 hours  ondansetron Injectable 4 milliGRAM(s) IV Push every 6 hours PRN  oxyCODONE    IR 5 milliGRAM(s) Oral every 6 hours PRN  oxyCODONE    IR 10 milliGRAM(s) Oral every 6 hours PRN  senna 2 Tablet(s) Oral at bedtime  sodium chloride 0.9%. 1000 milliLiter(s) (60 mL/Hr) IV Continuous <Continuous>    LABS:                      11.4   7.70  )-----------( 325      ( 06 Oct 2022 05:30 )             34.8     10-06    139  |  108  |  11  ----------------------------<  95  4.3   |  24  |  0.61    Ca    8.5      06 Oct 2022 05:30  Phos  3.4     10-06  Mg     2.3     10-06    TPro  8.2  /  Alb  4.8  /  TBili  0.3  /  DBili  x   /  AST  18  /  ALT  28  /  AlkPhos  67  10-05    LIVER FUNCTIONS - ( 05 Oct 2022 09:39 )  Alb: 4.8 g/dL / Pro: 8.2 g/dL / ALK PHOS: 67 U/L / ALT: 28 U/L / AST: 18 U/L / GGT: x

## 2022-10-06 NOTE — PROGRESS NOTE ADULT - SUBJECTIVE AND OBJECTIVE BOX
INTERVAL HISTORY: HPI:  43 yo female no pmh presenting with headache x 1 week. Pt also complaining of intermittent left eye pain with blurry vision x 3 months. CTA reveals small volume of SAH in the right temporal/occipital sulci. CTH reveals focal areas of narrowing involving R A2 and L A3 segments suspicious for vasculitis and L pcomm infundibulum. Patient reports taking baby aspirin for the past 3 days for pain control, last taken on 10/4. No history of trauma. Pt denies dizziness, n/v, blurry vision, SOB, CP, n/v, localized weakness or numbness/tingling. NIHSS 0, HH2, MF1. (05 Oct 2022 15:07)    Drug Dosing Weight  Height (cm): 155 (05 Oct 2022 09:32)  Weight (kg): 60 (05 Oct 2022 09:32)  BMI (kg/m2): 25 (05 Oct 2022 09:32)  BSA (m2): 1.59 (05 Oct 2022 09:32)    PAST MEDICAL & SURGICAL HISTORY:  Subarachnoid hemorrhage, nontraumatic      REVIEW OF SYSTEMS: [ ] Unable to Assess due to neurologic exam   [x] All ROS addressed below are non-contributory, except:  Neuro: [ ] Headache [ ] Back pain [ ] Numbness [ ] Weakness [ ] Ataxia [ ] Dizziness [ ] Aphasia [ ] Dysarthria [ ] Visual disturbance  Resp: [ ] Shortness of breath/dyspnea, [ ] Orthopnea [ ] Cough  CV: [ ] Chest pain [ ] Palpitation [ ] Lightheadedness [ ] Syncope  Renal: [ ] Thirst [ ] Edema  GI: [ ] Nausea [ ] Emesis [ ] Abdominal pain [ ] Constipation [ ] Diarrhea  Hem: [ ] Hematemesis [ ] bright red blood per rectum  ID: [ ] Fever [ ] Chills [ ] Dysuria  ENT: [ ] Rhinorrhea    PHYSICAL EXAM:    General: No Acute Distress   Neurological: Awake, alert oriented to person, place and time, Following Commands, PERRL, EOMI, Face Symmetrical, Speech Fluent, Moving all extremities, Muscle Strength normal in all four extremities, No Drift, Sensation to Light Touch Intact  Pulmonary: Clear to Auscultation, No Rales, No Rhonchi, No Wheezes   Cardiovascular: S1, S2, Regular Rate and rhythm   Gastrointestinal: Soft, Nontender, Nondistended   Extremities: No calf tenderness       ICU Vital Signs Last 24 Hrs  T(C): 36.6 (06 Oct 2022 21:05), Max: 37.1 (06 Oct 2022 01:56)  T(F): 97.9 (06 Oct 2022 21:05), Max: 98.8 (06 Oct 2022 01:56)  HR: 63 (06 Oct 2022 21:00) (48 - 69)  BP: 115/73 (06 Oct 2022 21:00) (91/52 - 121/67)  BP(mean): 90 (06 Oct 2022 21:00) (65 - 90)  ABP: 110/58 (06 Oct 2022 21:00) (104/54 - 124/67)  ABP(mean): 77 (06 Oct 2022 21:00) (73 - 89)  RR: 14 (06 Oct 2022 21:00) (14 - 18)  SpO2: 95% (06 Oct 2022 21:00) (93% - 99%)      10-05-22 @ 07:01  -  10-06-22 @ 07:00  --------------------------------------------------------  IN: 1080 mL / OUT: 950 mL / NET: 130 mL    10-06-22 @ 07:01  -  10-06-22 @ 22:25  --------------------------------------------------------  IN: 1054 mL / OUT: 1050 mL / NET: 4 mL      acetaminophen     Tablet .. 650 milliGRAM(s) Oral every 6 hours PRN  acetaminophen 300 mG/butalbital 50 mG/ caffeine 40 mG 2 Capsule(s) Oral every 8 hours  bisacodyl 5 milliGRAM(s) Oral at bedtime  chlorhexidine 2% Cloths 1 Application(s) Topical daily  insulin lispro (ADMELOG) corrective regimen sliding scale   SubCutaneous Before meals and at bedtime  metoclopramide Injectable 10 milliGRAM(s) IV Push every 6 hours  ondansetron Injectable 4 milliGRAM(s) IV Push every 6 hours PRN  oxyCODONE    IR 5 milliGRAM(s) Oral every 6 hours PRN  oxyCODONE    IR 10 milliGRAM(s) Oral every 6 hours PRN  senna 2 Tablet(s) Oral at bedtime  traMADol 100 milliGRAM(s) Oral every 6 hours  valproate sodium  IVPB 500 milliGRAM(s) IV Intermittent every 8 hours      LABS:  Na: 139 (10-06 @ 05:30), 137 (10-05 @ 09:39)  K: 4.3 (10-06 @ 05:30), 3.9 (10-05 @ 09:39)  Cl: 108 (10-06 @ 05:30), 103 (10-05 @ 09:39)  CO2: 24 (10-06 @ 05:30), 23 (10-05 @ 09:39)  BUN: 11 (10-06 @ 05:30), 16 (10-05 @ 09:39)  Cr: 0.61 (10-06 @ 05:30), 0.57 (10-05 @ 09:39)  Glu: 95(10-06 @ 05:30), 119(10-05 @ 09:39)    Hgb: 11.4 (10-06 @ 05:30), 12.6 (10-05 @ 09:39)  Hct: 34.8 (10-06 @ 05:30), 38.9 (10-05 @ 09:39)  WBC: 7.70 (10-06 @ 05:30), 11.31 (10-05 @ 09:39)  Plt: 325 (10-06 @ 05:30), 366 (10-05 @ 09:39)    INR: 1.10 10-05-22 @ 12:40  PTT: 32.6 10-05-22 @ 12:40    LIVER FUNCTIONS - ( 05 Oct 2022 09:39 )  Alb: 4.8 g/dL / Pro: 8.2 g/dL / ALK PHOS: 67 U/L / ALT: 28 U/L / AST: 18 U/L / GGT: x                INTERVAL HISTORY: HPI:  43 yo female no pmh presenting with headache x 1 week. Pt also complaining of intermittent left eye pain with blurry vision x 3 months. CTA reveals small volume of SAH in the right temporal/occipital sulci. CTH reveals focal areas of narrowing involving R A2 and L A3 segments suspicious for vasculitis and L pcomm infundibulum. Patient reports taking baby aspirin for the past 3 days for pain control, last taken on 10/4. No history of trauma. Pt denies dizziness, n/v, blurry vision, SOB, CP, n/v, localized weakness or numbness/tingling. NIHSS 0, HH2, MF1. (05 Oct 2022 15:07)    Drug Dosing Weight  Height (cm): 155 (05 Oct 2022 09:32)  Weight (kg): 60 (05 Oct 2022 09:32)  BMI (kg/m2): 25 (05 Oct 2022 09:32)  BSA (m2): 1.59 (05 Oct 2022 09:32)    PAST MEDICAL & SURGICAL HISTORY:  Subarachnoid hemorrhage, nontraumatic      REVIEW OF SYSTEMS: [ ] Unable to Assess due to neurologic exam   [x] All ROS addressed below are non-contributory, except:  Neuro: [ ] Headache [ ] Back pain [ ] Numbness [ ] Weakness [ ] Ataxia [ ] Dizziness [ ] Aphasia [ ] Dysarthria [ ] Visual disturbance  Resp: [ ] Shortness of breath/dyspnea, [ ] Orthopnea [ ] Cough  CV: [ ] Chest pain [ ] Palpitation [ ] Lightheadedness [ ] Syncope  Renal: [ ] Thirst [ ] Edema  GI: [ ] Nausea [ ] Emesis [ ] Abdominal pain [ ] Constipation [ ] Diarrhea  Hem: [ ] Hematemesis [ ] bright red blood per rectum  ID: [ ] Fever [ ] Chills [ ] Dysuria  ENT: [ ] Rhinorrhea    PHYSICAL EXAM:    General: No Acute Distress   Neurological: Awake, alert oriented to person, place and time, Following Commands, PERRL, EOMI, Face Symmetrical, Speech Fluent, Moving all extremities, Muscle Strength normal in all four extremities, No Drift, Sensation to Light Touch Intact  Pulmonary: Clear to Auscultation, No Rales, No Rhonchi, No Wheezes   Cardiovascular: S1, S2, Regular Rate and rhythm   Gastrointestinal: Soft, Nontender, Nondistended   Extremities: No calf tenderness   SKIN- right groin w/o hematoma       ICU Vital Signs Last 24 Hrs  T(C): 36.6 (06 Oct 2022 21:05), Max: 37.1 (06 Oct 2022 01:56)  T(F): 97.9 (06 Oct 2022 21:05), Max: 98.8 (06 Oct 2022 01:56)  HR: 63 (06 Oct 2022 21:00) (48 - 69)  BP: 115/73 (06 Oct 2022 21:00) (91/52 - 121/67)  BP(mean): 90 (06 Oct 2022 21:00) (65 - 90)  ABP: 110/58 (06 Oct 2022 21:00) (104/54 - 124/67)  ABP(mean): 77 (06 Oct 2022 21:00) (73 - 89)  RR: 14 (06 Oct 2022 21:00) (14 - 18)  SpO2: 95% (06 Oct 2022 21:00) (93% - 99%)      10-05-22 @ 07:01  -  10-06-22 @ 07:00  --------------------------------------------------------  IN: 1080 mL / OUT: 950 mL / NET: 130 mL    10-06-22 @ 07:01  -  10-06-22 @ 22:25  --------------------------------------------------------  IN: 1054 mL / OUT: 1050 mL / NET: 4 mL      acetaminophen     Tablet .. 650 milliGRAM(s) Oral every 6 hours PRN  acetaminophen 300 mG/butalbital 50 mG/ caffeine 40 mG 2 Capsule(s) Oral every 8 hours  bisacodyl 5 milliGRAM(s) Oral at bedtime  chlorhexidine 2% Cloths 1 Application(s) Topical daily  insulin lispro (ADMELOG) corrective regimen sliding scale   SubCutaneous Before meals and at bedtime  metoclopramide Injectable 10 milliGRAM(s) IV Push every 6 hours  ondansetron Injectable 4 milliGRAM(s) IV Push every 6 hours PRN  oxyCODONE    IR 5 milliGRAM(s) Oral every 6 hours PRN  oxyCODONE    IR 10 milliGRAM(s) Oral every 6 hours PRN  senna 2 Tablet(s) Oral at bedtime  traMADol 100 milliGRAM(s) Oral every 6 hours  valproate sodium  IVPB 500 milliGRAM(s) IV Intermittent every 8 hours      LABS:  Na: 139 (10-06 @ 05:30), 137 (10-05 @ 09:39)  K: 4.3 (10-06 @ 05:30), 3.9 (10-05 @ 09:39)  Cl: 108 (10-06 @ 05:30), 103 (10-05 @ 09:39)  CO2: 24 (10-06 @ 05:30), 23 (10-05 @ 09:39)  BUN: 11 (10-06 @ 05:30), 16 (10-05 @ 09:39)  Cr: 0.61 (10-06 @ 05:30), 0.57 (10-05 @ 09:39)  Glu: 95(10-06 @ 05:30), 119(10-05 @ 09:39)    Hgb: 11.4 (10-06 @ 05:30), 12.6 (10-05 @ 09:39)  Hct: 34.8 (10-06 @ 05:30), 38.9 (10-05 @ 09:39)  WBC: 7.70 (10-06 @ 05:30), 11.31 (10-05 @ 09:39)  Plt: 325 (10-06 @ 05:30), 366 (10-05 @ 09:39)    INR: 1.10 10-05-22 @ 12:40  PTT: 32.6 10-05-22 @ 12:40    LIVER FUNCTIONS - ( 05 Oct 2022 09:39 )  Alb: 4.8 g/dL / Pro: 8.2 g/dL / ALK PHOS: 67 U/L / ALT: 28 U/L / AST: 18 U/L / GGT: x

## 2022-10-06 NOTE — PRE-ANESTHESIA EVALUATION ADULT - WEIGHT IN LBS
Quality 111:Pneumonia Vaccination Status For Older Adults: Pneumococcal Vaccination not Administered or Previously Received, Reason not Otherwise Specified Detail Level: Detailed Quality 265: Biopsy Follow-Up: Documentation of Patient OR System Reason(s) for not Performing up to Three of the Four Components of the Numerator Instructions: Reviewing, Communicating, Tracking, and/or Documenting Biopsy Results, Patient not Eligible 132.2 Quality 110: Preventive Care And Screening: Influenza Immunization: Influenza immunization was not ordered or administered, reason not given

## 2022-10-06 NOTE — CONSULT NOTE ADULT - ASSESSMENT
41 yo female no PMHx presenting with headache x 1 week. CTH reveals small volume of SAH in the right temporal/occipital sulci. CTA reveals focal areas of narrowing involving R A2 and L A3 segments and L pcomm infundibulum. Admitted to ICU, pending cerebral angiogram. NIHSS 0.     If no small vessel vasculitis noted on cerebral angiogram then diagnosis of RCVS is likely.     1) SAH  - holding AC due to SAH   - agree with keppra 500mg bid     2) vasculitis vs RCVS  - currently on nimodipine 60mg q4h however would recommend switching to verapamil 60mg q6H or verapamil SR qd   - recommend adding topiramate 25mg qhs for headache relief   - may require LP if c/f vasculitis     2) Stroke risk factors  - A1C: 5.5  - LDL: 83  - TSH: 1.250    3) Further management  - obtain MRI brain without  - pending cerebral angiogram 10/6  - recommend SBP goal per NSGY   - recommend q1hr stroke neuro checks  - may need outpt neurology follow up  - provide stroke education    DVT prophylaxis   - holding chemoprophylaxis 2/2 SAH, SCDs    Discussed with Neurology Attending Dr. Valiente

## 2022-10-06 NOTE — BRIEF OPERATIVE NOTE - OPERATION/FINDINGS
Femoral cerebral angiogram performed under GA via right CFA using a 4Fr sheath. Bilateral cerebral angiogram performed, including b/l ICA, ECA, and vertebral artery injections. No cerebral aneurysm, AVM or dural fistula appreciated. Some areas of vessel irregularity/narrowing in the ESTHER and MCA branches, both proximal and distal, as well as some narrowing of the vertebrobasilar junction, right > left. This could represent vasculitis. RIght groin manually compressed for 15 minutes with hemostasis obtained. Patient extubated and will recover in NSICU.    Full report to follow, d/w Dr. Flores.

## 2022-10-06 NOTE — CHART NOTE - NSCHARTNOTEFT_GEN_A_CORE
10/6: Given oxycodone for headaches and zofran for nausea. Neuro exam remains stable.  Headache cocktail started: reglan standing, valproic acid, magnesium x1, fioricet standing, tramadol standing x72hrs. Pend MRI brain. F/u echo. POD#0 s/p femoral cerebral angiogram revealing; some vessel irregularities in ESTHER, MCA, and vertebrobasilar junction concerning for possible vasculitis. Rheumatology consulted, will see today, autoimmune panel sent yesterday pending results. Pend MRI brain, f/u stroke/neurology consult. Keppra stopped, vasculitis, no seizure ppx indicated after valproic acid completed for headache cocktail.

## 2022-10-06 NOTE — PROGRESS NOTE ADULT - ASSESSMENT
Assessment: 42F no medical hx admit for SAH ?vasculitis pending DSA/MRA       NEURO:  DSA-  Some areas of vessel irregularity/narrowing in the ESTHER and MCA branches, both proximal and distal, as well as some narrowing of the vertebrobasilar junction, right > left. ? vasculitis.  -neuro check q1  -pain management w/ Tylenol & oxycodone  -MRI brain +/- contrast   Vasculitis w/u -labs sent ;  dsDNA, cryoglobulins,   quantitative immunoglobulin (IgG, IgM, IgA - WNL)  La/SSb(-),Ro/SSA(-)  -ANCA(-) pANCA(-) cANCA (-)  -PT/OT evaluation  Consider LP r/o primary angitis of CNS (PACNS)    PULMONARY:  saturating well on RA,   -continue to monitor on pulse o2     CARDIOVASCULAR:  monitor on telemetry   vitals q1  sbp goal 100-140  TTE- WNL    GASTROENTEROLOGY:  regular diet,   ensure BMs w/ doculax & senna  Daily stool count, LBM prior to arrival    RENAL/:  -check BMP qd  -strict i/o's ;   -Na goal 135-145  -urine tox (negative)    ENDOCRINE:  A1c- 5.5  TSH-1.2    HEME/ONC:  DVT ppx: will hold chemical dvt ppx in setting of recent DSA  b/l SCDs  Obtain b/l LE screening dopplers    INFECTIOUS:   Monitor for fevers   -Hepatitis B & C (negative)  -pending quant gold  Assessment: 42F no medical hx admit for SAH ?vasculitis       NEURO:  DSA-  Some areas of vessel irregularity/narrowing in the ESTHER and MCA branches, both proximal and distal, as well as some narrowing of the vertebrobasilar junction, right > left. ? vasculitis.  -neuro check q1  -pain management w/ Tylenol & oxycodone  -MRI brain +/- contrast   Vasculitis w/u -labs sent ;  dsDNA, cryoglobulins,   quantitative immunoglobulin (IgG, IgM, IgA - WNL)  La/SSb(-),Ro/SSA(-)  -ANCA(-) pANCA(-) cANCA (-)  -PT/OT evaluation  Consider LP r/o primary angitis of CNS (PACNS)  -rheumatology following, neurology following     PULMONARY:  saturating well on RA,   -continue to monitor on pulse o2     CARDIOVASCULAR:  monitor on telemetry   vitals q1  sbp goal 100-140  TTE- WNL    GASTROENTEROLOGY:  regular diet,   ensure BMs w/ doculax & senna  Daily stool count, LBM prior to arrival    RENAL/:  -check BMP qd  -strict i/o's ;   -Na goal 135-145  -urine tox (negative)    ENDOCRINE:  A1c- 5.5  TSH-1.2    HEME/ONC:  DVT ppx: will hold chemical dvt ppx in setting of recent DSA  b/l SCDs  Obtain b/l LE screening dopplers    INFECTIOUS:   Monitor for fevers   -Hepatitis B & C (negative)  -pending quant gold

## 2022-10-06 NOTE — PROGRESS NOTE ADULT - ASSESSMENT
ASSESSMENT/PLAN: HH2 mF1 subarachnoid hemorrhage, bleed day 2  Etiology- possible vasculitis, CTA shows focal narrowing of R A2, L A3  Suspect vasculitis vs RCVS (less likely) vs PACNS  L PCOMM infundibulum on CTA    NEURO:  CT Head, CTA Head reviewed. Repeat CT head stable  MRI w/wo pending  Pending conventional angiogram 10/6  Seizure Prophylaxis: Levetiracetam for now until aneurysm ruled out  Q1 neurochecks  Delayed Cerebral Ischemia Management: Euvolemia, nimodipine.  Pain: PRN analgesics  Vasculitis workup:  Send BILL, ESR, CRP, complement levels C3, C4, ANCA, Ro/SSA, La/SSb, dsDNA, cryoglobulins, quantitative immunoglobulin (IgG, IgM, IgA).  (LP if no etiology found)  Urine toxicology screen negative  Stroke core measures  Stroke neurology consult  Activity: [] OOB as tolerated [] Bedrest [x] PT [x] OT 10/7 [] PMNR    PULM:  Incentive spirometry  On room air    CV:  SBP goal 90- 140  Baseline EKG- T wave inversions in V2  TTE pending  Troponin    RENAL:  Fluids: NS@60  Monitor electrolytes    GI:  Diet: NPO for angio  GI prophylaxis [] not indicated [] PPI [] other:  Bowel regimen [] colace [x] senna [] other:    ENDO:   Goal euglycemia (-180)  A1c 5.5  TSH 1.2  LDL 83    HEME/ONC:  VTE prophylaxis: [x] SCDs [] chemoprophylaxis   [x] hold chemoprophylaxis due to: SAH    ID: Mild leukocytosis- resolved  Afebrile. Monitor for signs of infection  HepB/ Hep C negative    MISC:  Consider rheumatology consult if suspicious for vasculitis    SOCIAL/FAMILY:  [x] awaiting [] updated at bedside [] family meeting    CODE STATUS:  [x] Full Code [] DNR [] DNI [] Palliative/Comfort Care    DISPOSITION:  [x] ICU [] Stroke Unit [] Floor [] EMU [] RCU [] PCU    [x] Patient is at high risk of neurologic deterioration/death due to: vasospasm, seizure, VTE    Time spent: 45 critical care minutes ASSESSMENT/PLAN: HH2 mF1 subarachnoid hemorrhage, bleed day 2  Etiology- possible vasculitis, CTA shows focal narrowing of R A2, L A3  Suspect vasculitis vs RCVS (less likely) vs PACNS  L PCOMM infundibulum on CTA    NEURO:  CT Head, CTA Head reviewed. Repeat CT head stable  MRI w/wo pending  Pending conventional angiogram 10/6  Seizure Prophylaxis: Levetiracetam for now until aneurysm ruled out  Q1 neurochecks  Delayed Cerebral Ischemia Management: Euvolemia, nimodipine.  Pain: Standing HA cocktail. Magnesium.  Vasculitis workup:  Send BILL, ESR, CRP, complement levels C3, C4, ANCA, Ro/SSA, La/SSb, dsDNA, cryoglobulins, quantitative immunoglobulin (IgG, IgM, IgA).  (LP if no etiology found  Stroke core measures  Stroke neurology consult  Activity: [] OOB as tolerated [] Bedrest [x] PT [x] OT 10/7 [] PMNR    PULM:  Incentive spirometry  On room air    CV:  SBP goal 90- 140  Baseline EKG- T wave inversions in V2. EKG re: QTc  TTE pending  Troponin neg x 1    RENAL:  Fluids: D5NS@60  Monitor electrolytes    GI:  Diet: NPO for angio  GI prophylaxis [] not indicated [] PPI [] other:  Bowel regimen [] colace [x] senna [] other: dulcolax  LFTs wnl    ENDO:   Goal euglycemia (-180)  A1c 5.5  TSH 1.2  LDL 83    HEME/ONC:  VTE prophylaxis: [x] SCDs [] chemoprophylaxis   [x] hold chemoprophylaxis due to: SAH  LE dopplers    ID: Mild leukocytosis- resolved  Afebrile. Monitor for signs of infection  HepB/ Hep C negative. Quantiferon    MISC:  Consider rheumatology consult if suspicious for vasculitis    SOCIAL/FAMILY:  [x] awaiting [] updated at bedside [] family meeting    CODE STATUS:  [x] Full Code [] DNR [] DNI [] Palliative/Comfort Care    DISPOSITION:  [x] ICU [] Stroke Unit [] Floor [] EMU [] RCU [] PCU    [x] Patient is at high risk of neurologic deterioration/death due to: vasospasm, seizure, VTE    Time spent: 45 critical care minutes ASSESSMENT/PLAN: HH2 mF1 subarachnoid hemorrhage, bleed day 2  Etiology- possible vasculitis, CTA shows focal narrowing of R A2, L A3  Suspect vasculitis vs RCVS (less likely) vs PACNS  L PCOMM infundibulum on CTA    NEURO:  CT Head, CTA Head reviewed. Repeat CT head stable  MRI w/wo pending  Pending conventional angiogram 10/6  Seizure Prophylaxis: Levetiracetam for now until aneurysm ruled out  Q1 neurochecks  Delayed Cerebral Ischemia Management: Euvolemia, nimodipine.  Pain: Standing HA cocktail. Magnesium.  Vasculitis workup:  Send BILL, ESR, CRP, complement levels C3, C4, ANCA, Ro/SSA, La/SSb, dsDNA, cryoglobulins, quantitative immunoglobulin (IgG, IgM, IgA).  (LP if no etiology found)  Stroke core measures  Stroke neurology consult  Activity: [] OOB as tolerated [] Bedrest [x] PT [x] OT 10/7 [] PMNR    PULM:  Incentive spirometry  On room air    CV:  SBP goal 90- 140  Baseline EKG- T wave inversions in V2. EKG re: QTc  TTE pending  Troponin neg x 1    RENAL:  Fluids: D5NS@60  Monitor electrolytes    GI:  Diet: NPO for angio  GI prophylaxis [] not indicated [] PPI [] other:  Bowel regimen [] colace [x] senna [] other: dulcolax  LFTs wnl    ENDO:   Goal euglycemia (-180)  A1c 5.5  TSH 1.2  LDL 83    HEME/ONC:  VTE prophylaxis: [x] SCDs [] chemoprophylaxis   [x] hold chemoprophylaxis due to: SAH  LE dopplers    ID: Mild leukocytosis- resolved  Afebrile. Monitor for signs of infection  HepB/ Hep C negative. Quantiferon    MISC:  Consider rheumatology consult if suspicious for vasculitis    SOCIAL/FAMILY:  [x] awaiting [] updated at bedside [] family meeting    CODE STATUS:  [x] Full Code [] DNR [] DNI [] Palliative/Comfort Care    DISPOSITION:  [x] ICU [] Stroke Unit [] Floor [] EMU [] RCU [] PCU    [x] Patient is at high risk of neurologic deterioration/death due to: vasospasm, seizure, VTE    Time spent: 45 critical care minutes

## 2022-10-06 NOTE — PROGRESS NOTE ADULT - SUBJECTIVE AND OBJECTIVE BOX
NEUROSURGERY POST OP NOTE:    POD# 0 S/P diagnostic cerebral angiogram     S: Patient seen at bedside in ICU. Complaining of pain, otherwise denies weakness, numbness, tingling, nausea, vomiting, chest pain, palpitations, shortness of breath, vision changes.     T(C): 36.7 (10-06-22 @ 16:40), Max: 37.1 (10-05-22 @ 22:00)  HR: 68 (10-06-22 @ 17:00) (48 - 69)  BP: 110/68 (10-06-22 @ 16:40) (91/52 - 129/65)  RR: 18 (10-06-22 @ 17:00) (14 - 18)  SpO2: 99% (10-06-22 @ 17:00) (95% - 99%)    10-05-22 @ 07:01  -  10-06-22 @ 07:00  --------------------------------------------------------  IN: 1080 mL / OUT: 950 mL / NET: 130 mL    10-06-22 @ 07:01  -  10-06-22 @ 17:54  --------------------------------------------------------  IN: 300 mL / OUT: 250 mL / NET: 50 mL    acetaminophen     Tablet .. 650 milliGRAM(s) Oral every 6 hours PRN  acetaminophen 300 mG/butalbital 50 mG/ caffeine 40 mG 2 Capsule(s) Oral every 8 hours  bisacodyl 5 milliGRAM(s) Oral at bedtime  chlorhexidine 2% Cloths 1 Application(s) Topical daily  insulin lispro (ADMELOG) corrective regimen sliding scale   SubCutaneous Before meals and at bedtime  metoclopramide Injectable 10 milliGRAM(s) IV Push every 6 hours  niMODipine 60 milliGRAM(s) Oral every 4 hours  ondansetron Injectable 4 milliGRAM(s) IV Push every 6 hours PRN  oxyCODONE    IR 5 milliGRAM(s) Oral every 6 hours PRN  oxyCODONE    IR 10 milliGRAM(s) Oral every 6 hours PRN  senna 2 Tablet(s) Oral at bedtime  sodium chloride 0.9%. 1000 milliLiter(s) IV Continuous <Continuous>  traMADol 100 milliGRAM(s) Oral every 6 hours  valproate sodium  IVPB 500 milliGRAM(s) IV Intermittent every 8 hours    RADIOLOGY:     Exam: Arabic speaking   Constitutional: 43 y/o female awake, alert in no acute distress.  Eyes: Sclera anicteric, conjunctiva noninjected.   ENMT: Oropharyngeal mucosa moist, pink. Tongue midline.    Respiratory: Clear to auscultation bilaterally. No rales, rhonchi, wheezes.  Cardiovascular: Regular rate and rhythm.   Gastrointestinal: Soft, nontender, nondistended.   Vascular: Extremities warm, no ulcers, no discoloration of skin. Pulses 2+ b/l  Neurological: AA&O x 3, conversant, appropriate. CN II-XII grossly intact. CHAIDEZ x 4, 5/5 throughout UE/LE. Sensation intact to light touch throughout. No pronator drift, no dysmetria.  Skin: Warm, dry, no erythema.  Incision: R groin site c/d/i with steristrips, gauze, tegaderm     Assessment:   43 y/o female no pmh presenting with headache x 1 week and blurry vision x 3 months. CTH reveals small volume of SAH in the right temporal/occipital sulci. CTA reveals focal areas of narrowing involving R A2 and L A3. s/p dx angiogram demonstrating vessel irregularity ESTHER, MCA, vert/basilar junction (10/6).     NEURO  - Neuro checks q 1, vitals q1  - Nimodipine 60 q 4   - Tramadol 100 q 6, fioricet standing, valproic acid 500 q 8, reglan 10 q 6   - pain control prn tylenol and oxy   - stability CTH 10/5,  CTA focal areas of narrowing involving R A2 and L A3 (10/5)   - Pending cerebral angiogram 10/6   - stroke/neuro consulted for vasculitis w/u   - MRI     CARDIO  - SBP   - EKG qtc 413 10/5  - Echo pending     PULM  - Tolerating RA   - IS     GI   - Advance diet as tolerated   - Bowel regimen     RENAL   - IV fluids while NPO  - Na goal 135-145     ENDO  - ISS, a1c 5.5  - TSH 1.2 wnl, LDL WNL     ID  - afebrile    HEME   - SCDs for DVT prophylaxis   - Pending Doppler     RHEUM:  - f/u consult recs, f/u rheum labs sent 10/5     DISPO   - PT/OT tomorrow   - ICU status, family updated     Assessment and plan discussed Dr. D'amico and Dr. White

## 2022-10-07 ENCOUNTER — RESULT REVIEW (OUTPATIENT)
Age: 42
End: 2022-10-07

## 2022-10-07 LAB
ANA PAT FLD IF-IMP: ABNORMAL
ANA TITR SER: ABNORMAL
ANION GAP SERPL CALC-SCNC: 10 MMOL/L — SIGNIFICANT CHANGE UP (ref 5–17)
APPEARANCE CSF: CLEAR — SIGNIFICANT CHANGE UP
APPEARANCE SPUN FLD: COLORLESS — SIGNIFICANT CHANGE UP
BUN SERPL-MCNC: 9 MG/DL — SIGNIFICANT CHANGE UP (ref 7–23)
CALCIUM SERPL-MCNC: 8.4 MG/DL — SIGNIFICANT CHANGE UP (ref 8.4–10.5)
CHLORIDE SERPL-SCNC: 103 MMOL/L — SIGNIFICANT CHANGE UP (ref 96–108)
CO2 SERPL-SCNC: 23 MMOL/L — SIGNIFICANT CHANGE UP (ref 22–31)
COLOR CSF: SIGNIFICANT CHANGE UP
CONFIRM APTT STACLOT: NEGATIVE — SIGNIFICANT CHANGE UP
CREAT SERPL-MCNC: 0.55 MG/DL — SIGNIFICANT CHANGE UP (ref 0.5–1.3)
CSF COMMENTS: SIGNIFICANT CHANGE UP
CSF PCR RESULT: SIGNIFICANT CHANGE UP
DRVVT SCREEN TO CONFIRM RATIO: SIGNIFICANT CHANGE UP
DSDNA AB SER-ACNC: <12 IU/ML — SIGNIFICANT CHANGE UP
EGFR: 117 ML/MIN/1.73M2 — SIGNIFICANT CHANGE UP
GLUCOSE BLDC GLUCOMTR-MCNC: 101 MG/DL — HIGH (ref 70–99)
GLUCOSE BLDC GLUCOMTR-MCNC: 108 MG/DL — HIGH (ref 70–99)
GLUCOSE BLDC GLUCOMTR-MCNC: 117 MG/DL — HIGH (ref 70–99)
GLUCOSE BLDC GLUCOMTR-MCNC: 129 MG/DL — HIGH (ref 70–99)
GLUCOSE CSF-MCNC: 67 MG/DL — SIGNIFICANT CHANGE UP (ref 40–70)
GLUCOSE SERPL-MCNC: 120 MG/DL — HIGH (ref 70–99)
GRAM STN FLD: SIGNIFICANT CHANGE UP
HCT VFR BLD CALC: 37.1 % — SIGNIFICANT CHANGE UP (ref 34.5–45)
HGB BLD-MCNC: 12.3 G/DL — SIGNIFICANT CHANGE UP (ref 11.5–15.5)
LA NT DPL PPP QL: 30.4 SEC — SIGNIFICANT CHANGE UP
LYMPHOCYTES # CSF: 1 % — LOW (ref 40–80)
MAGNESIUM SERPL-MCNC: 2.5 MG/DL — SIGNIFICANT CHANGE UP (ref 1.6–2.6)
MCHC RBC-ENTMCNC: 28.4 PG — SIGNIFICANT CHANGE UP (ref 27–34)
MCHC RBC-ENTMCNC: 33.2 GM/DL — SIGNIFICANT CHANGE UP (ref 32–36)
MCV RBC AUTO: 85.7 FL — SIGNIFICANT CHANGE UP (ref 80–100)
NEUTROPHILS # CSF: 0 % — SIGNIFICANT CHANGE UP (ref 0–6)
NRBC # BLD: 0 /100 WBCS — SIGNIFICANT CHANGE UP (ref 0–0)
NRBC NFR CSF: 1 /UL — SIGNIFICANT CHANGE UP (ref 0–5)
PHOSPHATE SERPL-MCNC: 3.4 MG/DL — SIGNIFICANT CHANGE UP (ref 2.5–4.5)
PLATELET # BLD AUTO: 397 K/UL — SIGNIFICANT CHANGE UP (ref 150–400)
POTASSIUM SERPL-MCNC: 4.1 MMOL/L — SIGNIFICANT CHANGE UP (ref 3.5–5.3)
POTASSIUM SERPL-SCNC: 4.1 MMOL/L — SIGNIFICANT CHANGE UP (ref 3.5–5.3)
PROT CSF-MCNC: 27 MG/DL — SIGNIFICANT CHANGE UP (ref 15–45)
RBC # BLD: 4.33 M/UL — SIGNIFICANT CHANGE UP (ref 3.8–5.2)
RBC # CSF: 1195 /UL — HIGH (ref 0–0)
RBC # FLD: 12.5 % — SIGNIFICANT CHANGE UP (ref 10.3–14.5)
SODIUM SERPL-SCNC: 136 MMOL/L — SIGNIFICANT CHANGE UP (ref 135–145)
SPECIMEN SOURCE: SIGNIFICANT CHANGE UP
TUBE TYPE: SIGNIFICANT CHANGE UP
WBC # BLD: 9.33 K/UL — SIGNIFICANT CHANGE UP (ref 3.8–10.5)
WBC # FLD AUTO: 9.33 K/UL — SIGNIFICANT CHANGE UP (ref 3.8–10.5)

## 2022-10-07 PROCEDURE — 70553 MRI BRAIN STEM W/O & W/DYE: CPT | Mod: 26

## 2022-10-07 PROCEDURE — 88305 TISSUE EXAM BY PATHOLOGIST: CPT | Mod: 26

## 2022-10-07 PROCEDURE — 99232 SBSQ HOSP IP/OBS MODERATE 35: CPT

## 2022-10-07 PROCEDURE — 88108 CYTOPATH CONCENTRATE TECH: CPT | Mod: 26

## 2022-10-07 RX ORDER — LIDOCAINE HCL 20 MG/ML
10 VIAL (ML) INJECTION ONCE
Refills: 0 | Status: COMPLETED | OUTPATIENT
Start: 2022-10-07 | End: 2022-10-07

## 2022-10-07 RX ORDER — MECLIZINE HCL 12.5 MG
5 TABLET ORAL EVERY 8 HOURS
Refills: 0 | Status: DISCONTINUED | OUTPATIENT
Start: 2022-10-07 | End: 2022-10-07

## 2022-10-07 RX ORDER — POLYETHYLENE GLYCOL 3350 17 G/17G
17 POWDER, FOR SOLUTION ORAL DAILY
Refills: 0 | Status: DISCONTINUED | OUTPATIENT
Start: 2022-10-07 | End: 2022-10-12

## 2022-10-07 RX ORDER — SODIUM CHLORIDE 9 MG/ML
500 INJECTION INTRAMUSCULAR; INTRAVENOUS; SUBCUTANEOUS ONCE
Refills: 0 | Status: COMPLETED | OUTPATIENT
Start: 2022-10-07 | End: 2022-10-07

## 2022-10-07 RX ORDER — MECLIZINE HCL 12.5 MG
12.5 TABLET ORAL EVERY 8 HOURS
Refills: 0 | Status: COMPLETED | OUTPATIENT
Start: 2022-10-07 | End: 2022-10-08

## 2022-10-07 RX ORDER — ENOXAPARIN SODIUM 100 MG/ML
40 INJECTION SUBCUTANEOUS EVERY 24 HOURS
Refills: 0 | Status: DISCONTINUED | OUTPATIENT
Start: 2022-10-08 | End: 2022-10-12

## 2022-10-07 RX ORDER — MECLIZINE HCL 12.5 MG
12.5 TABLET ORAL EVERY 8 HOURS
Refills: 0 | Status: DISCONTINUED | OUTPATIENT
Start: 2022-10-07 | End: 2022-10-07

## 2022-10-07 RX ORDER — LIDOCAINE HCL 20 MG/ML
10 VIAL (ML) INJECTION ONCE
Refills: 0 | Status: DISCONTINUED | OUTPATIENT
Start: 2022-10-07 | End: 2022-10-07

## 2022-10-07 RX ORDER — ENOXAPARIN SODIUM 100 MG/ML
40 INJECTION SUBCUTANEOUS EVERY 24 HOURS
Refills: 0 | Status: DISCONTINUED | OUTPATIENT
Start: 2022-10-07 | End: 2022-10-07

## 2022-10-07 RX ADMIN — TRAMADOL HYDROCHLORIDE 100 MILLIGRAM(S): 50 TABLET ORAL at 05:34

## 2022-10-07 RX ADMIN — TRAMADOL HYDROCHLORIDE 100 MILLIGRAM(S): 50 TABLET ORAL at 03:28

## 2022-10-07 RX ADMIN — Medication 10 MILLIGRAM(S): at 09:51

## 2022-10-07 RX ADMIN — POLYETHYLENE GLYCOL 3350 17 GRAM(S): 17 POWDER, FOR SOLUTION ORAL at 12:23

## 2022-10-07 RX ADMIN — TRAMADOL HYDROCHLORIDE 100 MILLIGRAM(S): 50 TABLET ORAL at 17:19

## 2022-10-07 RX ADMIN — Medication 2 CAPSULE(S): at 13:35

## 2022-10-07 RX ADMIN — TRAMADOL HYDROCHLORIDE 100 MILLIGRAM(S): 50 TABLET ORAL at 13:00

## 2022-10-07 RX ADMIN — Medication 5 MILLIGRAM(S): at 21:41

## 2022-10-07 RX ADMIN — Medication 2 CAPSULE(S): at 00:56

## 2022-10-07 RX ADMIN — Medication 2 CAPSULE(S): at 21:41

## 2022-10-07 RX ADMIN — SODIUM CHLORIDE 1000 MILLILITER(S): 9 INJECTION INTRAMUSCULAR; INTRAVENOUS; SUBCUTANEOUS at 09:43

## 2022-10-07 RX ADMIN — Medication 10 MILLIGRAM(S): at 05:11

## 2022-10-07 RX ADMIN — Medication 2 CAPSULE(S): at 05:34

## 2022-10-07 RX ADMIN — Medication 55 MILLIGRAM(S): at 17:20

## 2022-10-07 RX ADMIN — Medication 10 MILLIGRAM(S): at 17:19

## 2022-10-07 RX ADMIN — Medication 2 CAPSULE(S): at 22:39

## 2022-10-07 RX ADMIN — SENNA PLUS 2 TABLET(S): 8.6 TABLET ORAL at 21:41

## 2022-10-07 RX ADMIN — TRAMADOL HYDROCHLORIDE 100 MILLIGRAM(S): 50 TABLET ORAL at 17:26

## 2022-10-07 RX ADMIN — TRAMADOL HYDROCHLORIDE 100 MILLIGRAM(S): 50 TABLET ORAL at 05:12

## 2022-10-07 RX ADMIN — Medication 55 MILLIGRAM(S): at 09:50

## 2022-10-07 RX ADMIN — Medication 2 CAPSULE(S): at 05:12

## 2022-10-07 RX ADMIN — Medication 55 MILLIGRAM(S): at 01:20

## 2022-10-07 RX ADMIN — TRAMADOL HYDROCHLORIDE 100 MILLIGRAM(S): 50 TABLET ORAL at 12:23

## 2022-10-07 RX ADMIN — TRAMADOL HYDROCHLORIDE 100 MILLIGRAM(S): 50 TABLET ORAL at 01:20

## 2022-10-07 RX ADMIN — Medication 10 MILLILITER(S): at 12:00

## 2022-10-07 RX ADMIN — Medication 2 CAPSULE(S): at 14:06

## 2022-10-07 NOTE — DIETITIAN INITIAL EVALUATION ADULT - PERTINENT LABORATORY DATA
10-07    136  |  103  |  9   ----------------------------<  120<H>  4.1   |  23  |  0.55    Ca    8.4      07 Oct 2022 05:26  Phos  3.4     10-07  Mg     2.5     10-07    POCT Blood Glucose.: 117 mg/dL (10-07-22 @ 12:29)  A1C with Estimated Average Glucose Result: 5.5 % (10-05-22 @ 16:49)

## 2022-10-07 NOTE — PHYSICAL THERAPY INITIAL EVALUATION ADULT - IMPAIRMENTS CONTRIBUTING TO GAIT DEVIATIONS, PT EVAL
impaired balance/impaired coordination/narrow base of support/impaired postural control/decreased strength

## 2022-10-07 NOTE — PHYSICAL THERAPY INITIAL EVALUATION ADULT - PERTINENT HX OF CURRENT PROBLEM, REHAB EVAL
41 y/o female no pmh presenting with headache x 1 week and blurry vision x 3 months. CTH reveals small volume of SAH in the right temporal/occipital sulci. CTA reveals focal areas of narrowing involving R A2 and L A3. s/p dx angiogram demonstrating vessel irregularity ESTHER, MCA, vert/basilar junction (10/6).

## 2022-10-07 NOTE — DIETITIAN INITIAL EVALUATION ADULT - OTHER CALCULATIONS
%IBW: 126; IBW used to calculate estimated needs d/t pt being >120% IBW. Adjusted for clinical status/clinical judgment.

## 2022-10-07 NOTE — PROGRESS NOTE ADULT - SUBJECTIVE AND OBJECTIVE BOX
Neurology Stroke Progress Note    INTERVAL HPI/OVERNIGHT EVENTS:  No acute overnight events. Patient seen and examined. Feels intermittently dizzy and lightheaded but headache has improved.     MEDICATIONS  (STANDING):  acetaminophen 300 mG/butalbital 50 mG/ caffeine 40 mG 2 Capsule(s) Oral every 8 hours  bisacodyl 5 milliGRAM(s) Oral at bedtime  chlorhexidine 2% Cloths 1 Application(s) Topical daily  enoxaparin Injectable 40 milliGRAM(s) SubCutaneous every 24 hours  insulin lispro (ADMELOG) corrective regimen sliding scale   SubCutaneous Before meals and at bedtime  metoclopramide Injectable 10 milliGRAM(s) IV Push every 6 hours  polyethylene glycol 3350 17 Gram(s) Oral daily  senna 2 Tablet(s) Oral at bedtime  traMADol 100 milliGRAM(s) Oral every 6 hours  valproate sodium  IVPB 500 milliGRAM(s) IV Intermittent every 8 hours    MEDICATIONS  (PRN):  acetaminophen     Tablet .. 650 milliGRAM(s) Oral every 6 hours PRN Temp greater or equal to 38C (100.4F), Mild Pain (1 - 3)  ondansetron Injectable 4 milliGRAM(s) IV Push every 6 hours PRN Nausea and/or Vomiting  oxyCODONE    IR 5 milliGRAM(s) Oral every 6 hours PRN Moderate Pain (4 - 6)  oxyCODONE    IR 10 milliGRAM(s) Oral every 6 hours PRN Severe Pain (7 - 10)      Allergies    No Known Allergies    Intolerances    Vital Signs Last 24 Hrs  T(C): 36.9 (07 Oct 2022 09:48), Max: 36.9 (07 Oct 2022 05:08)  T(F): 98.4 (07 Oct 2022 09:48), Max: 98.4 (07 Oct 2022 05:08)  HR: 80 (07 Oct 2022 10:00) (55 - 80)  BP: 113/69 (07 Oct 2022 09:00) (94/53 - 115/73)  BP(mean): 87 (07 Oct 2022 09:00) (65 - 90)  RR: 16 (07 Oct 2022 09:00) (12 - 18)  SpO2: 97% (07 Oct 2022 10:00) (93% - 99%)    Parameters below as of 07 Oct 2022 10:00  Patient On (Oxygen Delivery Method): room air    Physical exam:  Constitutional: No acute distress, conversant    Neurologic:  -Mental status: Awake, alert, oriented to person, place, and time. Speech is fluent with intact naming, repetition, and comprehension, no dysarthria. Recent and remote memory intact. Follows commands. Attention/concentration intact. Fund of knowledge appropriate.  -Cranial nerves:   II: Visual fields are full to confrontation.  III, IV, VI: Extraocular movements are intact without nystagmus. Pupils equally round and reactive to light  V:  Facial sensation V1-V3 equal and intact   VII: Face is symmetric with normal eye closure and smile  VIII: Hearing is grossly intact   XII: Tongue protrudes midline  Motor: Normal bulk and tone. No pronator drift. Strength bilateral upper extremity 5/5, bilateral lower extremities 5/5.  Sensation: Intact to light touch bilaterally. No neglect or extinction on double simultaneous testing.  Coordination: No dysmetria on finger-to-nose and heel-to-shin bilaterally  Reflexes: Downgoing toes bilaterally     NIHSS: 0       LABS:                        12.3   9.33  )-----------( 397      ( 07 Oct 2022 05:26 )             37.1     10-07    136  |  103  |  9   ----------------------------<  120<H>  4.1   |  23  |  0.55    Ca    8.4      07 Oct 2022 05:26  Phos  3.4     10-07  Mg     2.5     10-07      PT/INR - ( 05 Oct 2022 12:40 )   PT: 13.1 sec;   INR: 1.10          PTT - ( 05 Oct 2022 12:40 )  PTT:32.6 sec      RADIOLOGY & ADDITIONAL TESTS:    Cerebral Angiogram 10/6  · Operative Findings	Femoral cerebral angiogram performed under GA via right CFA using a 4Fr sheath. Bilateral cerebral angiogram performed, including b/l ICA, ECA, and vertebral artery injections. No cerebral aneurysm, AVM or dural fistula appreciated. Some areas of vessel irregularity/narrowing in the ESTHER and MCA branches, both proximal and distal, as well as some narrowing of the vertebrobasilar junction, right > left. This could represent vasculitis. RIght groin manually compressed for 15 minutes with hemostasis obtained. Patient extubated and will recover in NSICU.    Full report to follow, d/w Dr. Flores.   Neurology Stroke Progress Note    INTERVAL HPI/OVERNIGHT EVENTS:  No acute overnight events. Patient seen and examined. Feels intermittently dizzy and lightheaded but headache has improved. Pt endorses dizziness that started today, worsening with horizontal eye movements. She is feeling nauseous but has had no episodes of emesis. Pt also states that she has had pain with walking. She has a bony-mass on the dorsal surface of her right foot. The pain radiates up to her ankle, but not beyond that. This has been going on for four months.     MEDICATIONS  (STANDING):  acetaminophen 300 mG/butalbital 50 mG/ caffeine 40 mG 2 Capsule(s) Oral every 8 hours  bisacodyl 5 milliGRAM(s) Oral at bedtime  chlorhexidine 2% Cloths 1 Application(s) Topical daily  enoxaparin Injectable 40 milliGRAM(s) SubCutaneous every 24 hours  insulin lispro (ADMELOG) corrective regimen sliding scale   SubCutaneous Before meals and at bedtime  metoclopramide Injectable 10 milliGRAM(s) IV Push every 6 hours  polyethylene glycol 3350 17 Gram(s) Oral daily  senna 2 Tablet(s) Oral at bedtime  traMADol 100 milliGRAM(s) Oral every 6 hours  valproate sodium  IVPB 500 milliGRAM(s) IV Intermittent every 8 hours    MEDICATIONS  (PRN):  acetaminophen     Tablet .. 650 milliGRAM(s) Oral every 6 hours PRN Temp greater or equal to 38C (100.4F), Mild Pain (1 - 3)  ondansetron Injectable 4 milliGRAM(s) IV Push every 6 hours PRN Nausea and/or Vomiting  oxyCODONE    IR 5 milliGRAM(s) Oral every 6 hours PRN Moderate Pain (4 - 6)  oxyCODONE    IR 10 milliGRAM(s) Oral every 6 hours PRN Severe Pain (7 - 10)      Allergies    No Known Allergies    Intolerances    Vital Signs Last 24 Hrs  T(C): 36.9 (07 Oct 2022 09:48), Max: 36.9 (07 Oct 2022 05:08)  T(F): 98.4 (07 Oct 2022 09:48), Max: 98.4 (07 Oct 2022 05:08)  HR: 80 (07 Oct 2022 10:00) (55 - 80)  BP: 113/69 (07 Oct 2022 09:00) (94/53 - 115/73)  BP(mean): 87 (07 Oct 2022 09:00) (65 - 90)  RR: 16 (07 Oct 2022 09:00) (12 - 18)  SpO2: 97% (07 Oct 2022 10:00) (93% - 99%)    Parameters below as of 07 Oct 2022 10:00  Patient On (Oxygen Delivery Method): room air    Physical exam:  Constitutional: No acute distress, conversant    Neurologic:  -Mental status: Awake, alert, oriented to person, place, and time. Speech is fluent with intact naming, repetition, and comprehension, no dysarthria. Recent and remote memory intact. Follows commands. Attention/concentration intact. Fund of knowledge appropriate.  -Cranial nerves:   II: Visual fields are full to confrontation.  III, IV, VI: Extraocular movements are intact without nystagmus. Pupils equally round and reactive to light  V:  Facial sensation V1-V3 equal and intact   VII: Face is symmetric with normal eye closure and smile  VIII: Hearing is grossly intact   XII: Tongue protrudes midline  Motor: Normal bulk and tone. No pronator drift. Strength bilateral upper extremity 5/5, bilateral lower extremities 5/5.  Sensation: Intact to light touch bilaterally. No neglect or extinction on double simultaneous testing.  Coordination: No dysmetria on finger-to-nose and heel-to-shin bilaterally  Reflexes: Downgoing toes bilaterally     NIHSS: 0       LABS:                        12.3   9.33  )-----------( 397      ( 07 Oct 2022 05:26 )             37.1     10-07    136  |  103  |  9   ----------------------------<  120<H>  4.1   |  23  |  0.55    Ca    8.4      07 Oct 2022 05:26  Phos  3.4     10-07  Mg     2.5     10-07      PT/INR - ( 05 Oct 2022 12:40 )   PT: 13.1 sec;   INR: 1.10          PTT - ( 05 Oct 2022 12:40 )  PTT:32.6 sec      RADIOLOGY & ADDITIONAL TESTS:    Cerebral Angiogram 10/6  · Operative Findings	Femoral cerebral angiogram performed under GA via right CFA using a 4Fr sheath. Bilateral cerebral angiogram performed, including b/l ICA, ECA, and vertebral artery injections. No cerebral aneurysm, AVM or dural fistula appreciated. Some areas of vessel irregularity/narrowing in the ESTHER and MCA branches, both proximal and distal, as well as some narrowing of the vertebrobasilar junction, right > left. This could represent vasculitis. RIght groin manually compressed for 15 minutes with hemostasis obtained. Patient extubated and will recover in NSICU.    Full report to follow, d/w Dr. Flores.

## 2022-10-07 NOTE — OCCUPATIONAL THERAPY INITIAL EVALUATION ADULT - MODIFIED CLINICAL TEST OF SENSORY INTEGRATION IN BALANCE TEST
Patient functionally mobilized a couple of feet forward and backwards with Min A x 2 b/l HH/trunk assist- c/o of dizziness, decreased balance, coordination activity tolerance- noted with narrow base of support and sway

## 2022-10-07 NOTE — OCCUPATIONAL THERAPY INITIAL EVALUATION ADULT - ADDITIONAL COMMENTS
Patient reports living in basement of a private home with her sister, with 50 steps down to enter. Patient states she was independent with all ADL's, IADL's and functional mobility with no AD prior to admission. Patient is R hand dominant and has a bathtub shower. 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

## 2022-10-07 NOTE — OCCUPATIONAL THERAPY INITIAL EVALUATION ADULT - REFERRAL TO ANOTHER SERVICE NEEDED, OT EVAL
Anticoagulation Clinic Progress Note  Indication: atrial fibrillation  Referring Provider: Sravani  Initial Warfarin Start Date: 2008  Goal INR: 2 - 3  Current Drug Interactions: fluoxetine, glimepiride, melatonin (PRN), mirtazepine  CHADS-VASc: 3 (age, HTN, DM)  EtOH: none  GLV: Salad (chopped kale salad or garden salad) and serving of broccoli once a week    Anticoagulation Clinic INR History:  Date 8/2 8/23 9/20 10/11 11/7 11/17 11/28 12/13 12/27   Total Weekly Dose 17.5mg 17.5 mg 17.5 mg 17.5mg 10mg 17.5 mg 17.5 mg 20mg 22.5 mg   INR 2.5 2.3 2.3 2.0 1.2 1.7 1.5 1.7 2.6   Notes     Pre- lumbar inj         Date 1/10/18 1/31 2/28 3/28 4/27 5/30 6/27 7/11 7/23 8/1 8/6 8/13   Total Weekly Dose 22.5 mg 22.5 mg 22.5 mg 22.5 mg 22.5 mg 22.5 mg 22.5 mg 22.5 mg 22.5 mg 20mg 12.5 mg 17.5 mg   INR 2.6 2.5 2.1 2.4 2.4 2.6 3.5 3.4 3.6 5.3, 4.9 1.9 2.7   Notes        Keflex         Date 8/20 9/4 10/2 11/6 11/15 11/29 12/31 1/7/19 1/17 1/29 2/12   Total Weekly Dose 17.5  mg 17.5 mg 17.5 mg 10mg 20mg 17.5mg 17.5mg 18.75 mg 17.5 mg 18.75 mg 20mg   INR 2.3 2.3 2.2 1.3 2.3 2.2 1.7 1.9 1.9 1.9 2.0   Notes    pre- epidural   missed dose?         Date 2/27 3/27 4/10 4/23 5/7 5/28 6/25 7/30 8/28 9/25 10/23   Total Weekly Dose 20mg 20mg 21.25 mg 21.25 mg 21.25 mg 21.25mg 21.25mg 21.25mg 21.25mg 21.25mg 21.25mg   INR 2.2 1.8 2.5 1.8 2.4 2.4 2.5 2.7 2.6 3.0 2.9   Notes sick   post- scope            Date  11/6 11/13 12/4 1/3 1/15 1/20        Total Weekly Dose 21.25 mg 18.75 mg 21.25 mg 21.25 mg 21.25 mg 20 mg        INR 4.2 3.2 2.7 2.2 3.1 3.1        Notes apap Hold x1, less GLV Inc in GLV  doxy doxy            Clinic Interview:  Verbal Release Authorization signed on 6/27/18 -- may speak with Sussy (wife), Nikolai (son)  Tablet Strength: pt has 2.5mg tablets  Phone: [phone] (Home), [phone]   Fax: [phone] (Attn: Tasha) [facility]    Patient Findings     Positives:  Change in diet/appetite   Negatives:   Signs/symptoms of thrombosis, Signs/symptoms of bleeding, Laboratory test error suspected, Change in health, Change in alcohol use, Change in activity, Upcoming invasive procedure, Emergency department visit, Upcoming dental procedure, Missed doses, Extra doses, Change in medications, Hospital admission, Bruising, Other complaints   Comments:  Tony Wolf denies s/sx of bleeding or bruising out of the ordinary. He stated that his appetite is back to normal but he does no like GLV very much. Since the last visit he would have ~3 pieces of broccoli a day. He reports that he has not started any new medications, and he has completed his course of doxycycline. He has been compliant with his warfarin since the last encounter. He states that he does not want to eat GLV until the next appointment. This is the reason for the larger dose decrease on 1/21         Plan:   1. INR is SUPRAtherapeutic today at 3.1. Instructed Mr. Wolf to DECREASE 1/21 dose to 1.25 mg then continue warfarin 2.5 mg oral daily except warfarin 3.75 mg TuesThursSat    2. RTC in on 1/31 per patient preference on a Friday  3. Verbal and written information was provided in clinic. Mr. Wolf voiced understanding with teach back and has no further questions at this time.       Sergei Mckeon, Pharmacy Intern   01/20/20   12:06 PM     ITeena, [facility], have reviewed the note in full and agree with the assessment and plan.  01/20/20  2:32 PM         PT following

## 2022-10-07 NOTE — PROGRESS NOTE ADULT - SUBJECTIVE AND OBJECTIVE BOX
HPI:  Patient is a 43 y/o female with no significant PMH, non smoker. She presents with headache x 1 week. Pt also complaining of intermittent left eye pain with blurry vision x 3 months. At times, she states that she is unable to lift the left eyelid.  She denies head trauma this week and describes the HA as dull. No thunderclap onset. She does endorse hitting her head against a sofa almost 3 weeks ago but did not have headache following that incident.  In ED, CT head showed small volume of SAH in the right temporal/occipital sulci. CTA showed focal areas of narrowing involving R A2 and L A3 segments suspicious for vasculitis as well as L PComm infundibulum. Patient reports taking baby aspirin for the past 3 days for pain control, last taken 3 days ago. She denies history of trauma. Pt denies dizziness, n/v, blurry vision, SOB, CP, n/v, localized weakness or numbness/tingling. NIHSS 0, HH2, MF1. (05 Oct 2022 15:07)    On admission, the patient was:  GCS:  Alonso-Sosa: 2  modified Nogueira: 1  ICH score:  NIHSS: 0    *** HIGH RISK OF DVT PRESENT ON ADMISSION ***      ICU Vital Signs Last 24 Hrs  T(C): 36.9 (07 Oct 2022 05:08), Max: 36.9 (07 Oct 2022 05:08)  T(F): 98.4 (07 Oct 2022 05:08), Max: 98.4 (07 Oct 2022 05:08)  HR: 71 (07 Oct 2022 05:00) (52 - 72)  BP: 112/60 (07 Oct 2022 03:00) (94/53 - 121/67)  BP(mean): 80 (07 Oct 2022 03:00) (65 - 90)  ABP: 117/62 (07 Oct 2022 05:00) (104/54 - 125/69)  ABP(mean): 82 (07 Oct 2022 05:00) (73 - 90)  RR: 15 (07 Oct 2022 05:00) (12 - 18)  SpO2: 95% (07 Oct 2022 05:00) (93% - 99%)      10-05-22 @ 07:01  -  10-06-22 @ 07:00  --------------------------------------------------------  IN: 1080 mL / OUT: 950 mL / NET: 130 mL    10-06-22 @ 07:01  -  10-07-22 @ 06:40  --------------------------------------------------------  IN: 1104 mL / OUT: 2750 mL / NET: -1646 mL      EXAMINATION: Examined in Portuguese [id number]  General: Calm  HEENT: MMM  Neuro: Awake, alert, oriented x 3, face symmetric, visual fields intact, follows commands, moves all extremities antigravity with some pain limitation  Cards: S1/S2, RRR  Respiratory: Clear, no wheeze  Abdomen: Soft, non-tender  Extremities: No edema  Skin:  Warm/dry      MEDICATIONS:  acetaminophen     Tablet .. 650 milliGRAM(s) Oral every 6 hours PRN  acetaminophen 300 mG/butalbital 50 mG/ caffeine 40 mG 2 Capsule(s) Oral every 8 hours  bisacodyl 5 milliGRAM(s) Oral at bedtime  chlorhexidine 2% Cloths 1 Application(s) Topical daily  insulin lispro (ADMELOG) corrective regimen sliding scale   SubCutaneous Before meals and at bedtime  metoclopramide Injectable 10 milliGRAM(s) IV Push every 6 hours  ondansetron Injectable 4 milliGRAM(s) IV Push every 6 hours PRN  oxyCODONE    IR 5 milliGRAM(s) Oral every 6 hours PRN  oxyCODONE    IR 10 milliGRAM(s) Oral every 6 hours PRN  senna 2 Tablet(s) Oral at bedtime  traMADol 100 milliGRAM(s) Oral every 6 hours  valproate sodium  IVPB 500 milliGRAM(s) IV Intermittent every 8 hours                          12.3   9.33  )-----------( 397      ( 07 Oct 2022 05:26 )             37.1     10-07    136  |  103  |  9   ----------------------------<  120<H>  4.1   |  23  |  0.55    Ca    8.4      07 Oct 2022 05:26  Phos  3.4     10-07  Mg     2.5     10-07    TPro  8.2  /  Alb  4.8  /  TBili  0.3  /  DBili  x   /  AST  18  /  ALT  28  /  AlkPhos  67  10-05    LIVER FUNCTIONS - ( 05 Oct 2022 09:39 )  Alb: 4.8 g/dL / Pro: 8.2 g/dL / ALK PHOS: 67 U/L / ALT: 28 U/L / AST: 18 U/L / GGT: x                    HPI:  Patient is a 43 y/o female with no significant PMH, non smoker. She presents with headache x 1 week. Pt also complaining of intermittent left eye pain with blurry vision x 3 months. At times, she states that she is unable to lift the left eyelid.  She denies head trauma this week and describes the HA as dull. No thunderclap onset. She does endorse hitting her head against a sofa almost 3 weeks ago but did not have headache following that incident.  In ED, CT head showed small volume of SAH in the right temporal/occipital sulci. CTA showed focal areas of narrowing involving R A2 and L A3 segments suspicious for vasculitis as well as L PComm infundibulum. Patient reports taking baby aspirin for the past 3 days for pain control, last taken 3 days ago. She denies history of trauma. Pt denies dizziness, n/v, blurry vision, SOB, CP, n/v, localized weakness or numbness/tingling. NIHSS 0, HH2, MF1. (05 Oct 2022 15:07)    On admission, the patient was:  GCS:  Alonso-Sosa: 2  modified Nogueira: 1  ICH score:  NIHSS: 0    *** HIGH RISK OF DVT PRESENT ON ADMISSION ***      ICU Vital Signs Last 24 Hrs  T(C): 36.9 (07 Oct 2022 05:08), Max: 36.9 (07 Oct 2022 05:08)  T(F): 98.4 (07 Oct 2022 05:08), Max: 98.4 (07 Oct 2022 05:08)  HR: 71 (07 Oct 2022 05:00) (52 - 72)  BP: 112/60 (07 Oct 2022 03:00) (94/53 - 121/67)  BP(mean): 80 (07 Oct 2022 03:00) (65 - 90)  ABP: 117/62 (07 Oct 2022 05:00) (104/54 - 125/69)  ABP(mean): 82 (07 Oct 2022 05:00) (73 - 90)  RR: 15 (07 Oct 2022 05:00) (12 - 18)  SpO2: 95% (07 Oct 2022 05:00) (93% - 99%)      10-05-22 @ 07:01  -  10-06-22 @ 07:00  --------------------------------------------------------  IN: 1080 mL / OUT: 950 mL / NET: 130 mL    10-06-22 @ 07:01  -  10-07-22 @ 06:40  --------------------------------------------------------  IN: 1104 mL / OUT: 2750 mL / NET: -1646 mL      EXAMINATION: Examined in Belarusian [id number]  General: Calm  HEENT: MMM  Neuro: Awake, alert, oriented x 3, face symmetric, visual fields intact, follows commands, moves all extremities antigravity with some pain limitation  Cards: S1/S2, RRR  Respiratory: Clear, no wheeze  Abdomen: Soft, non-tender  Extremities: No edema  Skin:  Warm/dry      MEDICATIONS:  acetaminophen     Tablet .. 650 milliGRAM(s) Oral every 6 hours PRN  acetaminophen 300 mG/butalbital 50 mG/ caffeine 40 mG 2 Capsule(s) Oral every 8 hours  bisacodyl 5 milliGRAM(s) Oral at bedtime  chlorhexidine 2% Cloths 1 Application(s) Topical daily  insulin lispro (ADMELOG) corrective regimen sliding scale   SubCutaneous Before meals and at bedtime  metoclopramide Injectable 10 milliGRAM(s) IV Push every 6 hours  ondansetron Injectable 4 milliGRAM(s) IV Push every 6 hours PRN  oxyCODONE    IR 5 milliGRAM(s) Oral every 6 hours PRN  oxyCODONE    IR 10 milliGRAM(s) Oral every 6 hours PRN  senna 2 Tablet(s) Oral at bedtime  traMADol 100 milliGRAM(s) Oral every 6 hours  valproate sodium  IVPB 500 milliGRAM(s) IV Intermittent every 8 hours      LABS:                         12.3   9.33  )-----------( 397      ( 07 Oct 2022 05:26 )             37.1     10-07    136  |  103  |  9   ----------------------------<  120<H>  4.1   |  23  |  0.55    Ca    8.4      07 Oct 2022 05:26  Phos  3.4     10-07  Mg     2.5     10-07    TPro  8.2  /  Alb  4.8  /  TBili  0.3  /  DBili  x   /  AST  18  /  ALT  28  /  AlkPhos  67  10-05    LIVER FUNCTIONS - ( 05 Oct 2022 09:39 )  Alb: 4.8 g/dL / Pro: 8.2 g/dL / ALK PHOS: 67 U/L / ALT: 28 U/L / AST: 18 U/L / GGT: x

## 2022-10-07 NOTE — PROGRESS NOTE ADULT - SUBJECTIVE AND OBJECTIVE BOX
HPI:  43 yo female no pmh presenting with headache x 1 week. Pt also complaining of intermittent left eye pain with blurry vision x 3 months. CTA reveals small volume of SAH in the right temporal/occipital sulci. CTH reveals focal areas of narrowing involving R A2 and L A3 segments suspicious for vasculitis and L pcomm infundibulum. Patient reports taking baby aspirin for the past 3 days for pain control, last taken on 10/4. No history of trauma. Pt denies dizziness, n/v, blurry vision, SOB, CP, n/v, localized weakness or numbness/tingling. NIHSS 0, HH2, MF1. (05 Oct 2022 15:07)    OVERNIGHT EVENTS: no acute events, started eating regular diet   Vital Signs Last 24 Hrs  T(C): 36.6 (06 Oct 2022 21:05), Max: 37.1 (06 Oct 2022 01:56)  T(F): 97.9 (06 Oct 2022 21:05), Max: 98.8 (06 Oct 2022 01:56)  HR: 66 (06 Oct 2022 23:00) (48 - 69)  BP: 104/59 (06 Oct 2022 23:00) (91/52 - 121/67)  BP(mean): 75 (06 Oct 2022 23:00) (65 - 90)  RR: 14 (06 Oct 2022 23:00) (14 - 18)  SpO2: 96% (06 Oct 2022 23:00) (93% - 99%)    Parameters below as of 06 Oct 2022 23:00  Patient On (Oxygen Delivery Method): room air        I&O's Summary    05 Oct 2022 07:01  -  06 Oct 2022 07:00  --------------------------------------------------------  IN: 1080 mL / OUT: 950 mL / NET: 130 mL    06 Oct 2022 07:01  -  07 Oct 2022 00:08  --------------------------------------------------------  IN: 1054 mL / OUT: 1050 mL / NET: 4 mL        Exam: Vietnamese speaking   Constitutional: 43 y/o female awake, alert in no acute distress.  Eyes: Sclera anicteric, conjunctiva noninjected.   ENMT: Oropharyngeal mucosa moist, pink. Tongue midline.    Respiratory: Clear to auscultation bilaterally. No rales, rhonchi, wheezes.  Cardiovascular: Regular rate and rhythm.   Gastrointestinal: Soft, nontender, nondistended.   Vascular: Extremities warm, no ulcers, no discoloration of skin. Pulses 2+ b/l  Neurological: AA&O x 3, conversant, appropriate. CN II-XII grossly intact. CHAIDEZ x 4, 5/5 throughout UE/LE. Sensation intact to light touch throughout. No pronator drift, no dysmetria.  Skin: Warm, dry, no erythema.  Incision: R groin site c/d/i with steristrips, gauze, tegaderm     LABS:                        11.4   7.70  )-----------( 325      ( 06 Oct 2022 05:30 )             34.8     10-    139  |  108  |  11  ----------------------------<  95  4.3   |  24  |  0.61    Ca    8.5      06 Oct 2022 05:30  Phos  3.4     10-  Mg     2.3     10-    TPro  8.2  /  Alb  4.8  /  TBili  0.3  /  DBili  x   /  AST  18  /  ALT  28  /  AlkPhos  67  10-05    PT/INR - ( 05 Oct 2022 12:40 )   PT: 13.1 sec;   INR: 1.10          PTT - ( 05 Oct 2022 12:40 )  PTT:32.6 sec  Urinalysis Basic - ( 05 Oct 2022 09:39 )    Color: Yellow / Appearance: Clear / S.020 / pH: x  Gluc: x / Ketone: NEGATIVE  / Bili: Negative / Urobili: 0.2 E.U./dL   Blood: x / Protein: NEGATIVE mg/dL / Nitrite: NEGATIVE   Leuk Esterase: NEGATIVE / RBC: > 10 /HPF / WBC < 5 /HPF   Sq Epi: x / Non Sq Epi: 0-5 /HPF / Bacteria: Present /HPF      CARDIAC MARKERS ( 05 Oct 2022 16:49 )  x     / 0.01 ng/mL / x     / x     / x          CAPILLARY BLOOD GLUCOSE      POCT Blood Glucose.: 120 mg/dL (06 Oct 2022 21:05)  POCT Blood Glucose.: 106 mg/dL (06 Oct 2022 17:39)  POCT Blood Glucose.: 109 mg/dL (06 Oct 2022 11:46)  POCT Blood Glucose.: 88 mg/dL (06 Oct 2022 06:24)      Drug Levels: [] N/A    CSF Analysis: [] N/A      Allergies    No Known Allergies    Intolerances      MEDICATIONS:  Antibiotics:    Neuro:  acetaminophen     Tablet .. 650 milliGRAM(s) Oral every 6 hours PRN  acetaminophen 300 mG/butalbital 50 mG/ caffeine 40 mG 2 Capsule(s) Oral every 8 hours  metoclopramide Injectable 10 milliGRAM(s) IV Push every 6 hours  ondansetron Injectable 4 milliGRAM(s) IV Push every 6 hours PRN  oxyCODONE    IR 5 milliGRAM(s) Oral every 6 hours PRN  oxyCODONE    IR 10 milliGRAM(s) Oral every 6 hours PRN  traMADol 100 milliGRAM(s) Oral every 6 hours  valproate sodium  IVPB 500 milliGRAM(s) IV Intermittent every 8 hours    Anticoagulation:    OTHER:  bisacodyl 5 milliGRAM(s) Oral at bedtime  chlorhexidine 2% Cloths 1 Application(s) Topical daily  insulin lispro (ADMELOG) corrective regimen sliding scale   SubCutaneous Before meals and at bedtime  senna 2 Tablet(s) Oral at bedtime    43 y/o female no pmh presenting with headache x 1 week and blurry vision x 3 months. CTH reveals small volume of SAH in the right temporal/occipital sulci. CTA reveals focal areas of narrowing involving R A2 and L A3. s/p dx angiogram demonstrating vessel irregularity ESTHER, MCA, vert/basilar junction (10/6).     NEURO  - Neuro checks q 1, vitals q1  - Nimodipine 60 q 4   - Tramadol 100 q 6, fioricet standing, valproic acid 500 q 8, reglan 10 q 6   - pain control prn tylenol and oxy   - stability CTH 10/5,  CTA focal areas of narrowing involving R A2 and L A3 (10/5)   - Pending cerebral angiogram 10/6   - stroke/neuro consulted for vasculitis w/u   - MRI     CARDIO  - SBP   - EKG qtc 413 10/5  - Echo pending     PULM  - Tolerating RA   - IS     GI   - Diet: regular   - Bowel regimen     RENAL   - Na goal 135-145     ENDO  - ISS, a1c 5.5  - TSH 1.2 wnl, LDL WNL     ID  - afebrile    HEME   - SCDs for DVT prophylaxis   - Screening dopplers negative    RHEUM:  - f/u consult recs, f/u rheum labs sent 10/5     DISPO   - PT/OT tomorrow   - ICU status, family updated     Assessment and plan discussed Dr. D'amico and Dr. White    HPI:  43 yo female no pmh presenting with headache x 1 week. Pt also complaining of intermittent left eye pain with blurry vision x 3 months. CTA reveals small volume of SAH in the right temporal/occipital sulci. CTH reveals focal areas of narrowing involving R A2 and L A3 segments suspicious for vasculitis and L pcomm infundibulum. Patient reports taking baby aspirin for the past 3 days for pain control, last taken on 10/4. No history of trauma. Pt denies dizziness, n/v, blurry vision, SOB, CP, n/v, localized weakness or numbness/tingling. NIHSS 0, HH2, MF1. (05 Oct 2022 15:07)    OVERNIGHT EVENTS: no acute events, started eating regular diet   Vital Signs Last 24 Hrs  T(C): 36.6 (06 Oct 2022 21:05), Max: 37.1 (06 Oct 2022 01:56)  T(F): 97.9 (06 Oct 2022 21:05), Max: 98.8 (06 Oct 2022 01:56)  HR: 66 (06 Oct 2022 23:00) (48 - 69)  BP: 104/59 (06 Oct 2022 23:00) (91/52 - 121/67)  BP(mean): 75 (06 Oct 2022 23:00) (65 - 90)  RR: 14 (06 Oct 2022 23:00) (14 - 18)  SpO2: 96% (06 Oct 2022 23:00) (93% - 99%)    Parameters below as of 06 Oct 2022 23:00  Patient On (Oxygen Delivery Method): room air        I&O's Summary    05 Oct 2022 07:01  -  06 Oct 2022 07:00  --------------------------------------------------------  IN: 1080 mL / OUT: 950 mL / NET: 130 mL    06 Oct 2022 07:01  -  07 Oct 2022 00:08  --------------------------------------------------------  IN: 1054 mL / OUT: 1050 mL / NET: 4 mL        Exam: Belarusian speaking,  used Katie #[id number]  Constitutional: 43 y/o female awake, alert in no acute distress.  Eyes: Sclera anicteric, conjunctiva noninjected.   ENMT: Oropharyngeal mucosa moist, pink. Tongue midline.    Respiratory: Clear to auscultation bilaterally. No rales, rhonchi, wheezes.  Cardiovascular: Regular rate and rhythm.   Gastrointestinal: Soft, nontender, nondistended.   Vascular: Extremities warm, no ulcers, no discoloration of skin. Pulses 2+ b/l  Neurological: AA&O x 3, conversant, appropriate. CN II-XII grossly intact. CHAIDEZ x 4, 5/5 throughout UE/LE. Sensation intact to light touch throughout. No pronator drift, no dysmetria.  Skin: Warm, dry, no erythema.  Incision: R groin site c/d/i with steristrips, gauze, tegaderm     LABS:                        11.4   7.70  )-----------( 325      ( 06 Oct 2022 05:30 )             34.8     10-06    139  |  108  |  11  ----------------------------<  95  4.3   |  24  |  0.61    Ca    8.5      06 Oct 2022 05:30  Phos  3.4     10-06  Mg     2.3     10-06    TPro  8.2  /  Alb  4.8  /  TBili  0.3  /  DBili  x   /  AST  18  /  ALT  28  /  AlkPhos  67  10-05    PT/INR - ( 05 Oct 2022 12:40 )   PT: 13.1 sec;   INR: 1.10          PTT - ( 05 Oct 2022 12:40 )  PTT:32.6 sec  Urinalysis Basic - ( 05 Oct 2022 09:39 )    Color: Yellow / Appearance: Clear / S.020 / pH: x  Gluc: x / Ketone: NEGATIVE  / Bili: Negative / Urobili: 0.2 E.U./dL   Blood: x / Protein: NEGATIVE mg/dL / Nitrite: NEGATIVE   Leuk Esterase: NEGATIVE / RBC: > 10 /HPF / WBC < 5 /HPF   Sq Epi: x / Non Sq Epi: 0-5 /HPF / Bacteria: Present /HPF      CARDIAC MARKERS ( 05 Oct 2022 16:49 )  x     / 0.01 ng/mL / x     / x     / x          CAPILLARY BLOOD GLUCOSE      POCT Blood Glucose.: 120 mg/dL (06 Oct 2022 21:05)  POCT Blood Glucose.: 106 mg/dL (06 Oct 2022 17:39)  POCT Blood Glucose.: 109 mg/dL (06 Oct 2022 11:46)  POCT Blood Glucose.: 88 mg/dL (06 Oct 2022 06:24)      Drug Levels: [] N/A    CSF Analysis: [] N/A      Allergies    No Known Allergies    Intolerances      MEDICATIONS:  Antibiotics:    Neuro:  acetaminophen     Tablet .. 650 milliGRAM(s) Oral every 6 hours PRN  acetaminophen 300 mG/butalbital 50 mG/ caffeine 40 mG 2 Capsule(s) Oral every 8 hours  metoclopramide Injectable 10 milliGRAM(s) IV Push every 6 hours  ondansetron Injectable 4 milliGRAM(s) IV Push every 6 hours PRN  oxyCODONE    IR 5 milliGRAM(s) Oral every 6 hours PRN  oxyCODONE    IR 10 milliGRAM(s) Oral every 6 hours PRN  traMADol 100 milliGRAM(s) Oral every 6 hours  valproate sodium  IVPB 500 milliGRAM(s) IV Intermittent every 8 hours    Anticoagulation:    OTHER:  bisacodyl 5 milliGRAM(s) Oral at bedtime  chlorhexidine 2% Cloths 1 Application(s) Topical daily  insulin lispro (ADMELOG) corrective regimen sliding scale   SubCutaneous Before meals and at bedtime  senna 2 Tablet(s) Oral at bedtime    43 y/o female no pmh presenting with headache x 1 week and blurry vision x 3 months. CTH reveals small volume of SAH in the right temporal/occipital sulci. CTA reveals focal areas of narrowing involving R A2 and L A3. s/p dx angiogram demonstrating vessel irregularity ESTHER, MCA, vert/basilar junction (10/6).     NEURO  - Neuro checks q 1, vitals q1  - Nimodipine 60 q 4   - Tramadol 100 q 6, fioricet standing, valproic acid 500 q 8, reglan 10 q 6   - pain control prn tylenol and oxy   - stability CTH 10/5,  CTA focal areas of narrowing involving R A2 and L A3 (10/5)   - Pending cerebral angiogram 10/6   - stroke/neuro consulted for vasculitis w/u   - MRI     CARDIO  - SBP   - EKG qtc 413 10/5  - Echo pending     PULM  - Tolerating RA   - IS     GI   - Diet: regular   - Bowel regimen     RENAL   - Na goal 135-145     ENDO  - ISS, a1c 5.5  - TSH 1.2 wnl, LDL WNL     ID  - afebrile    HEME   - SCDs for DVT prophylaxis   - Screening dopplers negative    RHEUM:  - f/u consult recs, f/u rheum labs sent 10/5     DISPO   - PT/OT tomorrow   - ICU status, family updated     Assessment and plan discussed Dr. D'amico and Dr. White

## 2022-10-07 NOTE — DIETITIAN INITIAL EVALUATION ADULT - PERTINENT MEDS FT
MEDICATIONS  (STANDING):  acetaminophen 300 mG/butalbital 50 mG/ caffeine 40 mG 2 Capsule(s) Oral every 8 hours  bisacodyl 5 milliGRAM(s) Oral at bedtime  chlorhexidine 2% Cloths 1 Application(s) Topical daily  enoxaparin Injectable 40 milliGRAM(s) SubCutaneous every 24 hours  insulin lispro (ADMELOG) corrective regimen sliding scale   SubCutaneous Before meals and at bedtime  metoclopramide Injectable 10 milliGRAM(s) IV Push every 6 hours  polyethylene glycol 3350 17 Gram(s) Oral daily  senna 2 Tablet(s) Oral at bedtime  traMADol 100 milliGRAM(s) Oral every 6 hours  valproate sodium  IVPB 500 milliGRAM(s) IV Intermittent every 8 hours    MEDICATIONS  (PRN):  acetaminophen     Tablet .. 650 milliGRAM(s) Oral every 6 hours PRN Temp greater or equal to 38C (100.4F), Mild Pain (1 - 3)  ondansetron Injectable 4 milliGRAM(s) IV Push every 6 hours PRN Nausea and/or Vomiting  oxyCODONE    IR 5 milliGRAM(s) Oral every 6 hours PRN Moderate Pain (4 - 6)  oxyCODONE    IR 10 milliGRAM(s) Oral every 6 hours PRN Severe Pain (7 - 10)

## 2022-10-07 NOTE — PHYSICAL THERAPY INITIAL EVALUATION ADULT - GAIT DEVIATIONS NOTED, PT EVAL
pt with difficulty lifting and advancing RLE, difficulty maintaining posture during instances of RLE single leg stance/decreased carla/decreased step length/decreased weight-shifting ability

## 2022-10-07 NOTE — PHYSICAL THERAPY INITIAL EVALUATION ADULT - ADDITIONAL COMMENTS
Pt reports living in basement of apartment building with 50 steps to enter. Reports living with sister, and is a  for work. Reports not using DME and is independent in daily activities.

## 2022-10-07 NOTE — OCCUPATIONAL THERAPY INITIAL EVALUATION ADULT - GENERAL OBSERVATIONS, REHAB EVAL
PT Javier present. Patient primarily Barbadian speaking,  called. Patient received semisupine in bed +tele, +A-line, +b/l SCD's, +purewick, room air, NAD.

## 2022-10-07 NOTE — PROGRESS NOTE ADULT - ASSESSMENT
43 yo female no PMHx presenting with headache x 1 week. CTH reveals small volume of SAH in the right temporal/occipital sulci. CTA reveals focal areas of narrowing involving R A2 and L A3 segments and L pcomm infundibulum. Admitted to NSICU for close monitoring. Cerebral angiogram demonstrates areas of irregularity and narrowing in ESTHER and MCA branches proximally and distally likely representing vasculitis.     1) SAH  - holding AC due to SAH   - keppra dced per nsgy     2) vasculitis vs RCVS  - currently on nimodipine 60mg q4h however would recommend switching to verapamil 60mg q6H or verapamil SR qd   - recommend adding topiramate 25mg qhs for headache relief   - please perform LP and order the following CSF studies: protein, glucose, LDH, cell count, Igg index, PCR panel, ACE, autoimmune panel, oligoclonal bands, cytology, paraneoplastic autoantibody.     2) Stroke risk factors  - A1C: 5.5  - LDL: 83  - TSH: 1.250    3) Further management  - obtain MRI brain without  - cerebral angiogram performed 10/6 - Some areas of vessel irregularity/narrowing in the ESTHER and MCA branches, both proximal and distal, as well as some narrowing of the vertebrobasilar junction, right > left. This could represent vasculitis.  - recommend SBP goal per NSGY   - recommend q1hr stroke neuro checks  - may need outpt neurology follow up  - provide stroke education    DVT prophylaxis   - lovenox 40 SQ, SCDs    Discussed with Neurology Attending Dr. Valiente

## 2022-10-07 NOTE — PHYSICAL THERAPY INITIAL EVALUATION ADULT - GENERAL OBSERVATIONS, REHAB EVAL
PT IE completed. Chart reviewed. MRS 4. Pt received semi-supine, NAD, +R A line, +tele, +pure wick. PHI Correa cleared pt for PT. MD White cleared pt for PT.

## 2022-10-07 NOTE — DIETITIAN INITIAL EVALUATION ADULT - NSFNSGIIOFT_GEN_A_CORE
No n/v at this time, pt with n/v yesterday and one week prior   Last +BM PTA  No distention/discomfort

## 2022-10-07 NOTE — DIETITIAN INITIAL EVALUATION ADULT - OTHER INFO
41 yo female no PMHx presenting with headache x 1 week. CTH reveals small volume of SAH in the right temporal/occipital sulci. CTA reveals focal areas of narrowing involving R A2 and L A3 segments and L pcomm infundibulum. Admitted to NSICU for close monitoring. Cerebral angiogram demonstrates areas of irregularity and narrowing in ESTHER and MCA branches proximally and distally likely representing vasculitis.     Pt seen at bedside for initial assessment.  used to communicate with patient: (ID: [id number], Name: Hollie). MAP 83. TMAX 98.4F. Denies nausea/vomiting at this time. However, pt endorses nausea associated with headache for one week prior to admission. Reports last bowel movement PTA. per chart, NKFA. Endorses change in appetite PTA 2/2 headache and nausea; however, prior to nausea/headache, pt endorses 3 meals/day at home. Pt enjoys tuna fish sandwiches, rice & beans, vegetables, chicken, etc. Pt endorses varied, healthy diet. Verbalizes no recent weight loss. No edema documented at this time. No pressure ulcers documented at this time. Gaston score=17. NFPE unremarkable. Based on ASPEN guidelines, pt does not meet criteria for malnutrition at this time. Discussed current diet order; provided pt with diet education regarding importance of adequate PO intake; amenable to education. Pt reports feeling hungry during hospital stay, able to complete 50-75% of meals thus far since headache dissipated yesterday. Pt reports no cultural/Mandaeism or other food preferences. Made aware RD remains available. RD to follow up. See nutrition recommendations below.

## 2022-10-07 NOTE — OCCUPATIONAL THERAPY INITIAL EVALUATION ADULT - PERTINENT HX OF CURRENT PROBLEM, REHAB EVAL
43 yo female no pmh presenting with headache x 1 week. Pt also complaining of intermittent left eye pain with blurry vision x 3 months. CTA reveals small volume of SAH in the right temporal/occipital sulci. CTH reveals focal areas of narrowing involving R A2 and L A3 segments suspicious for vasculitis and L pcomm infundibulum. Patient reports taking baby aspirin for the past 3 days for pain control, last taken on 10/4. No history of trauma. Pt denies dizziness, n/v, blurry vision, SOB, CP, n/v, localized weakness or numbness/tingling. NIHSS 0, HH2, MF1. 43 yo female no pmh presenting with headache x 1 week. Pt also complaining of intermittent left eye pain with blurry vision x 3 months. CTA reveals small volume of SAH in the right temporal/occipital sulci. CTH reveals focal areas of narrowing involving R A2 and L A3 segments suspicious for vasculitis and L pcomm infundibulum. Patient reports taking baby aspirin for the past 3 days for pain control, last taken on 10/4. No history of trauma. Pt denies dizziness, n/v, blurry vision, SOB, CP, n/v, localized weakness or numbness/tingling. NIHSS 0, HH2, MF1. s/p dx angiogram on 10/6 demonstrating vessel irregularity ESTHER, MCA, vert/basilar junction (10/6).

## 2022-10-07 NOTE — OCCUPATIONAL THERAPY INITIAL EVALUATION ADULT - DIAGNOSIS, OT EVAL
MRS 4. Patient POD #1 s/p cerebral angio, presents with c/o of dizziness throughout which got worse upon standing, slight RUE/RLE weakness, deficits with overall balance, and activity tolerance impacting independence with functional activities and mobility.

## 2022-10-07 NOTE — PHYSICAL THERAPY INITIAL EVALUATION ADULT - SENSORY TESTS
(L) hand  5/5, (R) hand  4+/5. CN Testing: B/L Frontalis intact; B/L buccinator intact; smile symmetrical; tongue protrusion at midline; B/L eyes open/close intact; Vision H-Test: bilateral tracking and smooth pursuit intact; Convergence/Divergence: impaired; Vision Quadrant Test: intact L, unable to tolerate further testing 2/2 c/o dizziness

## 2022-10-07 NOTE — OCCUPATIONAL THERAPY INITIAL EVALUATION ADULT - PHYSICAL ASSIST/NONPHYSICAL ASSIST: SUPINE/SIT, REHAB EVAL
This consult was requested by Neonatology (See Consult Request) secondary to increased risk of developmental delays and evaluation for need for Early Intention Services including PT/ OT/ SP-Feeding Concern for congenital toxoplasmosis abnormal MRI rule out toxoplasma retinitis verbal cues/nonverbal cues (demo/gestures)/1 person assist

## 2022-10-07 NOTE — OCCUPATIONAL THERAPY INITIAL EVALUATION ADULT - SENSORY TESTS
Visual fields are full to confrontation, H and Quad test in L eye intact unable to perform R eye 2/2 increased dizziness, Eye movements are intact without nystagmus, Pupils equally round and reactive to light, facial sensation V1-V3 equal and intact, face is symmetric with normal eye closure and smile, tongue protrudes midlines, shoulder shrugs intact, puffing cheeks intact, b/l eye brow shrugs intact,  hearing bilaterally with rubs intact

## 2022-10-07 NOTE — PHYSICAL THERAPY INITIAL EVALUATION ADULT - MANUAL MUSCLE TESTING RESULTS, REHAB EVAL
MMT performed: (L)UE and (L)LE N/T; (R)shoulder flexion 4+/5, (R)elbow flexion NT, (R)elbow extension NT, (R)wrist extension N/T, (R)wrist flexion N/T; (R)hip flexion NT, (R)knee extension NT, (R)knee flexion N/T, (R)ankle DF 4+/5, (R)ankle PF 4+/5

## 2022-10-07 NOTE — PROGRESS NOTE ADULT - ASSESSMENT
ASSESSMENT/PLAN: HH2 mF1 subarachnoid hemorrhage, bleed day 3  Suspect vasculitis vs RCVS (less likely) vs PACNS      NEURO:  Q4 neurochecks  MRI w/wo pending  S/P conventional angiogram 10/6:   Showed areas of vessel irregularity/narrowing in the ESTHER and MCA branches, both proximal and distal, as well as some narrowing of the vertebrobasilar junction, right > left.   Seizure Prophylaxis: none required.  Analgesia: HA cocktail with improvement  Vasculitis workup: See MISC  (LP if no etiology found)  Stroke core measures  Stroke neurology consult  Activity: [] OOB as tolerated [] Bedrest [x] PT [x] OT [] PMNR    PULM:  Incentive spirometry  On room air    CV:  SBP goal 90- 140  Baseline EKG- T wave inversions in V2. EKG re: QTc  TTE: EF 65-70%. No WMA. Mild aortic regurgitation, trace MR/TR.  Troponin neg x 1    RENAL:  Fluids: None   Replete electrolytes prn    GI:  Diet: As tolerated  GI prophylaxis [] not indicated [] PPI [] other:  Bowel regimen [] colace [x] senna [] other: dulcolax  LFTs wnl    ENDO:   Goal euglycemia (-180)  A1c 5.5  TSH 1.2  LDL 83    HEME/ONC:  VTE prophylaxis: [x] SCDs [] chemoprophylaxis   [x]Start chemoprophylaxis 10/7  LE dopplers negative    ID: Mild leukocytosis- resolved  Afebrile. Monitor for signs of infection  HepB/ Hep C negative. Quantiferon pending    MISC:  Rheumatology consulted  BILL positive 1:6.4, ESR & CRP negative, C3, C4 negative  ANCA neg, Ro/SSA, La/SSb neg  dsDNA, cryoglobulins, quantitative immunoglobulin (IgG, IgM, IgA) pending    SOCIAL/FAMILY:  [x] awaiting [] updated at bedside [] family meeting    CODE STATUS:  [x] Full Code [] DNR [] DNI [] Palliative/Comfort Care    DISPOSITION:  [] ICU [] Stroke Unit [x] Floor [] EMU [] RCU [] PCU  Stepdown to stroke unit ASSESSMENT/PLAN: HH2 mF1 subarachnoid hemorrhage, bleed day 3  Suspect vasculitis vs RCVS (less likely) vs PACNS    NEURO:  Q4 neurochecks  MRI w/wo pending  S/P conventional angiogram 10/6:   Showed areas of vessel irregularity/narrowing in the ESTHER and MCA branches, both proximal and distal, as well as some narrowing of the vertebrobasilar junction, right > left.   Seizure Prophylaxis: none required.  Analgesia: HA cocktail with improvement  Vasculitis workup: See MISC  (LP if no etiology found)  Stroke core measures  Stroke neurology consult  Activity: [] OOB as tolerated [] Bedrest [x] PT [x] OT [] PMNR    PULM:  Incentive spirometry  On room air    CV:  SBP goal 90- 140  Baseline EKG- T wave inversions in V2. EKG re: QTc  TTE: EF 65-70%. No WMA. Mild aortic regurgitation, trace MR/TR.  Troponin neg x 1    RENAL: Urine retention  Fluids: None   Replete electrolytes prn  Ambulate aggressively     GI:  Diet: As tolerated  GI prophylaxis [] not indicated [] PPI [] other:  Bowel regimen [] colace [x] senna [] other: dulcolax, miralax  LFTs wnl    ENDO:   Goal euglycemia (-180)  A1c 5.5  TSH 1.2  LDL 83    HEME/ONC:  VTE prophylaxis: [x] SCDs [] chemoprophylaxis   [x] Start chemoprophylaxis 10/7  LE dopplers negative    ID: Mild leukocytosis- resolved  Afebrile. Monitor for signs of infection  HepB/ Hep C negative. Quantiferon pending    MISC:  Rheumatology consulted  BILL positive 1:6.4, ESR & CRP negative, C3, C4 negative  ANCA neg, Ro/SSA, La/SSb neg  dsDNA, cryoglobulins, quantitative immunoglobulin (IgG, IgM, IgA) pending    SOCIAL/FAMILY:  [x] awaiting [] updated at bedside [] family meeting    CODE STATUS:  [x] Full Code [] DNR [] DNI [] Palliative/Comfort Care    DISPOSITION:  [] ICU [] Stroke Unit [x] Floor [] EMU [] RCU [] PCU    Stepdown to stroke unit  ICU stepdown Checklist:    Completed: 10-07 @ 09:33    [X] hemodynamically stable – VS WNL and stable x 24hours, UO adequate  [n/a ] if  previously on HDA - off pressors x 24h with stable neuro exam    [X] no new symptoms x 24h (i.e. new fever, new-onset nausea/vomiting)  [X] stable labs: (i.e. WBC not rising, sodium not dropping)  [X] patient not at high risk for aspiration, if high risk then:                  [ ] should have definitive plans for trach/PEG (alternative option is to discharge from ICU to facilty)                  [ ] stepdown to bed close to nurse’s station  [n/a] low suctioning requirements (i.e. q4h or less)  [X] sign-off from primary RN*  [X] drains do not require ICU level of care  [X] if patient previously agitated or with behavioral issues – controlled   [X] pain controlled   ASSESSMENT/PLAN: HH2 mF1 subarachnoid hemorrhage, bleed day 3  Suspect vasculitis vs RCVS (less likely) vs PACNS    NEURO:  Q4 neurochecks  MRI w/wo pending  S/P conventional angiogram 10/6:   Showed areas of vessel irregularity/narrowing in the SETHER and MCA branches, both proximal and distal, as well as some narrowing of the vertebrobasilar junction, right > left.   Seizure Prophylaxis: none required.  Analgesia: HA cocktail with improvement  Vasculitis workup: See MISC  Pending LP  Stroke core measures  Stroke neurology consult  Activity: [] OOB as tolerated [] Bedrest [x] PT [x] OT [] PMNR    PULM:  Incentive spirometry  On room air    CV:  SBP goal 90- 140  Baseline EKG- T wave inversions in V2. EKG re: QTc  TTE: EF 65-70%. No WMA. Mild aortic regurgitation, trace MR/TR.  Troponin neg x 1    RENAL: Urine retention  Fluids: None   Replete electrolytes prn  Ambulate aggressively     GI:  Diet: As tolerated  GI prophylaxis [] not indicated [] PPI [] other:  Bowel regimen [] colace [x] senna [] other: dulcolax, miralax  LFTs wnl    ENDO:   Goal euglycemia (-180)  A1c 5.5  TSH 1.2  LDL 83    HEME/ONC:  VTE prophylaxis: [x] SCDs [] chemoprophylaxis   [x] Start chemoprophylaxis 10/7  LE dopplers negative    ID: Mild leukocytosis- resolved  Afebrile. Monitor for signs of infection  HepB/ Hep C negative. Quantiferon pending    MISC:  Rheumatology consulted  BILL positive 1:6.4, ESR & CRP negative, C3, C4 negative  ANCA neg, Ro/SSA, La/SSb neg  dsDNA, cryoglobulins, quantitative immunoglobulin (IgG, IgM, IgA) pending    SOCIAL/FAMILY:  [x] awaiting [] updated at bedside [] family meeting    CODE STATUS:  [x] Full Code [] DNR [] DNI [] Palliative/Comfort Care    DISPOSITION:  [] ICU [] Stroke Unit [x] Floor [] EMU [] RCU [] PCU    Stepdown to stroke unit  ICU stepdown Checklist:    Completed: 10-07 @ 09:33    [X] hemodynamically stable – VS WNL and stable x 24hours, UO adequate  [n/a ] if  previously on HDA - off pressors x 24h with stable neuro exam    [X] no new symptoms x 24h (i.e. new fever, new-onset nausea/vomiting)  [X] stable labs: (i.e. WBC not rising, sodium not dropping)  [X] patient not at high risk for aspiration, if high risk then:                  [ ] should have definitive plans for trach/PEG (alternative option is to discharge from ICU to facilty)                  [ ] stepdown to bed close to nurse’s station  [n/a] low suctioning requirements (i.e. q4h or less)  [X] sign-off from primary RN*  [X] drains do not require ICU level of care  [X] if patient previously agitated or with behavioral issues – controlled   [X] pain controlled

## 2022-10-07 NOTE — PROCEDURE NOTE - NSPROCDETAILS_GEN_ALL_CORE
Spinal needle introduced into L3-5 intravertebral space with clear CSF egress./location identified, draped/prepped, sterile technique used, needle inserted/introduced/area cleaned in sterile fashion

## 2022-10-08 LAB
ANION GAP SERPL CALC-SCNC: 10 MMOL/L — SIGNIFICANT CHANGE UP (ref 5–17)
ANTI-RIBONUCLEAR PROTEIN: <0.2 AI — SIGNIFICANT CHANGE UP
BUN SERPL-MCNC: 10 MG/DL — SIGNIFICANT CHANGE UP (ref 7–23)
CALCIUM SERPL-MCNC: 8.5 MG/DL — SIGNIFICANT CHANGE UP (ref 8.4–10.5)
CHLORIDE SERPL-SCNC: 103 MMOL/L — SIGNIFICANT CHANGE UP (ref 96–108)
CO2 SERPL-SCNC: 24 MMOL/L — SIGNIFICANT CHANGE UP (ref 22–31)
CREAT SERPL-MCNC: 0.67 MG/DL — SIGNIFICANT CHANGE UP (ref 0.5–1.3)
CRYPTOC AG CSF-ACNC: NEGATIVE — SIGNIFICANT CHANGE UP
EGFR: 111 ML/MIN/1.73M2 — SIGNIFICANT CHANGE UP
ENA SM AB FLD QL: <0.2 AI — SIGNIFICANT CHANGE UP
GLUCOSE BLDC GLUCOMTR-MCNC: 100 MG/DL — HIGH (ref 70–99)
GLUCOSE BLDC GLUCOMTR-MCNC: 104 MG/DL — HIGH (ref 70–99)
GLUCOSE BLDC GLUCOMTR-MCNC: 88 MG/DL — SIGNIFICANT CHANGE UP (ref 70–99)
GLUCOSE BLDC GLUCOMTR-MCNC: 90 MG/DL — SIGNIFICANT CHANGE UP (ref 70–99)
GLUCOSE BLDC GLUCOMTR-MCNC: 91 MG/DL — SIGNIFICANT CHANGE UP (ref 70–99)
GLUCOSE SERPL-MCNC: 93 MG/DL — SIGNIFICANT CHANGE UP (ref 70–99)
HCT VFR BLD CALC: 37.1 % — SIGNIFICANT CHANGE UP (ref 34.5–45)
HGB BLD-MCNC: 12.2 G/DL — SIGNIFICANT CHANGE UP (ref 11.5–15.5)
LDH CSF L TO P-CCNC: 16 U/L — SIGNIFICANT CHANGE UP
LDH FLD-CCNC: 16 U/L — SIGNIFICANT CHANGE UP
MAGNESIUM SERPL-MCNC: 2.2 MG/DL — SIGNIFICANT CHANGE UP (ref 1.6–2.6)
MCHC RBC-ENTMCNC: 28.7 PG — SIGNIFICANT CHANGE UP (ref 27–34)
MCHC RBC-ENTMCNC: 32.9 GM/DL — SIGNIFICANT CHANGE UP (ref 32–36)
MCV RBC AUTO: 87.3 FL — SIGNIFICANT CHANGE UP (ref 80–100)
NIGHT BLUE STAIN TISS: SIGNIFICANT CHANGE UP
NRBC # BLD: 0 /100 WBCS — SIGNIFICANT CHANGE UP (ref 0–0)
PHOSPHATE SERPL-MCNC: 3.2 MG/DL — SIGNIFICANT CHANGE UP (ref 2.5–4.5)
PLATELET # BLD AUTO: 355 K/UL — SIGNIFICANT CHANGE UP (ref 150–400)
POTASSIUM SERPL-MCNC: 3.9 MMOL/L — SIGNIFICANT CHANGE UP (ref 3.5–5.3)
POTASSIUM SERPL-SCNC: 3.9 MMOL/L — SIGNIFICANT CHANGE UP (ref 3.5–5.3)
RBC # BLD: 4.25 M/UL — SIGNIFICANT CHANGE UP (ref 3.8–5.2)
RBC # FLD: 12.9 % — SIGNIFICANT CHANGE UP (ref 10.3–14.5)
SODIUM SERPL-SCNC: 137 MMOL/L — SIGNIFICANT CHANGE UP (ref 135–145)
SPECIMEN SOURCE: SIGNIFICANT CHANGE UP
TROPONIN T SERPL-MCNC: <0.01 NG/ML — SIGNIFICANT CHANGE UP (ref 0–0.01)
WBC # BLD: 12.25 K/UL — HIGH (ref 3.8–10.5)
WBC # FLD AUTO: 12.25 K/UL — HIGH (ref 3.8–10.5)

## 2022-10-08 PROCEDURE — 71045 X-RAY EXAM CHEST 1 VIEW: CPT | Mod: 26

## 2022-10-08 PROCEDURE — 99253 IP/OBS CNSLTJ NEW/EST LOW 45: CPT

## 2022-10-08 RX ORDER — VERAPAMIL HCL 240 MG
120 CAPSULE, EXTENDED RELEASE PELLETS 24 HR ORAL DAILY
Refills: 0 | Status: DISCONTINUED | OUTPATIENT
Start: 2022-10-08 | End: 2022-10-08

## 2022-10-08 RX ORDER — VALPROIC ACID (AS SODIUM SALT) 250 MG/5ML
500 SOLUTION, ORAL ORAL
Refills: 0 | Status: DISCONTINUED | OUTPATIENT
Start: 2022-10-08 | End: 2022-10-12

## 2022-10-08 RX ORDER — VERAPAMIL HCL 240 MG
120 CAPSULE, EXTENDED RELEASE PELLETS 24 HR ORAL DAILY
Refills: 0 | Status: DISCONTINUED | OUTPATIENT
Start: 2022-10-08 | End: 2022-10-09

## 2022-10-08 RX ADMIN — Medication 120 MILLIGRAM(S): at 09:36

## 2022-10-08 RX ADMIN — TRAMADOL HYDROCHLORIDE 100 MILLIGRAM(S): 50 TABLET ORAL at 07:05

## 2022-10-08 RX ADMIN — Medication 55 MILLIGRAM(S): at 01:23

## 2022-10-08 RX ADMIN — Medication 55 MILLIGRAM(S): at 23:11

## 2022-10-08 RX ADMIN — TRAMADOL HYDROCHLORIDE 100 MILLIGRAM(S): 50 TABLET ORAL at 00:08

## 2022-10-08 RX ADMIN — POLYETHYLENE GLYCOL 3350 17 GRAM(S): 17 POWDER, FOR SOLUTION ORAL at 11:03

## 2022-10-08 RX ADMIN — Medication 55 MILLIGRAM(S): at 09:36

## 2022-10-08 RX ADMIN — Medication 2 CAPSULE(S): at 07:05

## 2022-10-08 RX ADMIN — CHLORHEXIDINE GLUCONATE 1 APPLICATION(S): 213 SOLUTION TOPICAL at 06:51

## 2022-10-08 RX ADMIN — TRAMADOL HYDROCHLORIDE 100 MILLIGRAM(S): 50 TABLET ORAL at 06:49

## 2022-10-08 RX ADMIN — Medication 12.5 MILLIGRAM(S): at 09:57

## 2022-10-08 RX ADMIN — Medication 2 CAPSULE(S): at 06:49

## 2022-10-08 RX ADMIN — SENNA PLUS 2 TABLET(S): 8.6 TABLET ORAL at 21:09

## 2022-10-08 RX ADMIN — Medication 5 MILLIGRAM(S): at 21:09

## 2022-10-08 RX ADMIN — ENOXAPARIN SODIUM 40 MILLIGRAM(S): 100 INJECTION SUBCUTANEOUS at 21:09

## 2022-10-08 RX ADMIN — TRAMADOL HYDROCHLORIDE 100 MILLIGRAM(S): 50 TABLET ORAL at 00:32

## 2022-10-08 NOTE — PROGRESS NOTE ADULT - SUBJECTIVE AND OBJECTIVE BOX
Neurology Stroke Progress Note    INTERVAL HPI/OVERNIGHT EVENTS:  Patient seen and examined. Reports persistent headache. Underwent MRI brain with and without contrast overnight, pending official read.     MEDICATIONS  (STANDING):  bisacodyl 5 milliGRAM(s) Oral at bedtime  enoxaparin Injectable 40 milliGRAM(s) SubCutaneous every 24 hours  insulin lispro (ADMELOG) corrective regimen sliding scale   SubCutaneous Before meals and at bedtime  polyethylene glycol 3350 17 Gram(s) Oral daily  senna 2 Tablet(s) Oral at bedtime  traMADol 100 milliGRAM(s) Oral every 6 hours  valproate sodium  IVPB 500 milliGRAM(s) IV Intermittent every 8 hours  verapamil  milliGRAM(s) Oral daily    MEDICATIONS  (PRN):  meclizine 12.5 milliGRAM(s) Oral every 8 hours PRN Dizziness  ondansetron Injectable 4 milliGRAM(s) IV Push every 6 hours PRN Nausea and/or Vomiting      Allergies    No Known Allergies    Intolerances        Vital Signs Last 24 Hrs  T(C): 36.8 (08 Oct 2022 06:00), Max: 36.9 (07 Oct 2022 09:48)  T(F): 98.2 (08 Oct 2022 06:00), Max: 98.4 (07 Oct 2022 09:48)  HR: 78 (08 Oct 2022 08:25) (65 - 80)  BP: 125/75 (08 Oct 2022 08:25) (104/66 - 127/75)  BP(mean): 95 (08 Oct 2022 08:25) (64 - 97)  RR: 17 (08 Oct 2022 08:25) (16 - 18)  SpO2: 96% (08 Oct 2022 08:25) (93% - 97%)    Parameters below as of 08 Oct 2022 08:25  Patient On (Oxygen Delivery Method): room air      Neurologic:  -Mental status: Awake, alert, oriented to person, place, and time. Speech is fluent with intact naming, repetition, and comprehension, no dysarthria. Recent and remote memory intact. Follows commands. Attention/concentration intact. Fund of knowledge appropriate.  -Cranial nerves:   II: Visual fields are full to confrontation.  III, IV, VI: Extraocular movements are intact without nystagmus. Pupils equally round and reactive to light  V:  Facial sensation V1-V3 equal and intact   VII: Face is symmetric with normal eye closure and smile  VIII: Hearing is grossly intact   XII: Tongue protrudes midline  Motor: Normal bulk and tone. No pronator drift. Strength bilateral upper extremity 5/5, bilateral lower extremities 5/5.  Sensation: Intact to light touch bilaterally. No neglect or extinction on double simultaneous testing.  Coordination: No dysmetria on finger-to-nose and heel-to-shin bilaterally    LABS:                        12.2   12.25 )-----------( 355      ( 08 Oct 2022 06:10 )             37.1     10-08    137  |  103  |  10  ----------------------------<  93  3.9   |  24  |  0.67    Ca    8.5      08 Oct 2022 06:10  Phos  3.2     10-08  Mg     2.2     10-08            RADIOLOGY & ADDITIONAL TESTS:    Cerebral Angiogram 10/6  · Operative Findings	Femoral cerebral angiogram performed under GA via right CFA using a 4Fr sheath. Bilateral cerebral angiogram performed, including b/l ICA, ECA, and vertebral artery injections. No cerebral aneurysm, AVM or dural fistula appreciated. Some areas of vessel irregularity/narrowing in the ESTHER and MCA branches, both proximal and distal, as well as some narrowing of the vertebrobasilar junction, right > left. This could represent vasculitis. RIght groin manually compressed for 15 minutes with hemostasis obtained. Patient extubated and will recover in NSICU.    Full report to follow, d/w Dr. Floers.   Neurology Stroke Progress Note    INTERVAL HPI/OVERNIGHT EVENTS:  Patient seen and examined. Reports intermittent headaches. Underwent MRI brain with and without contrast overnight, pending official read.     MEDICATIONS  (STANDING):  bisacodyl 5 milliGRAM(s) Oral at bedtime  enoxaparin Injectable 40 milliGRAM(s) SubCutaneous every 24 hours  insulin lispro (ADMELOG) corrective regimen sliding scale   SubCutaneous Before meals and at bedtime  polyethylene glycol 3350 17 Gram(s) Oral daily  senna 2 Tablet(s) Oral at bedtime  traMADol 100 milliGRAM(s) Oral every 6 hours  valproate sodium  IVPB 500 milliGRAM(s) IV Intermittent every 8 hours  verapamil  milliGRAM(s) Oral daily    MEDICATIONS  (PRN):  meclizine 12.5 milliGRAM(s) Oral every 8 hours PRN Dizziness  ondansetron Injectable 4 milliGRAM(s) IV Push every 6 hours PRN Nausea and/or Vomiting      Allergies    No Known Allergies    Intolerances        Vital Signs Last 24 Hrs  T(C): 36.8 (08 Oct 2022 06:00), Max: 36.9 (07 Oct 2022 09:48)  T(F): 98.2 (08 Oct 2022 06:00), Max: 98.4 (07 Oct 2022 09:48)  HR: 78 (08 Oct 2022 08:25) (65 - 80)  BP: 125/75 (08 Oct 2022 08:25) (104/66 - 127/75)  BP(mean): 95 (08 Oct 2022 08:25) (64 - 97)  RR: 17 (08 Oct 2022 08:25) (16 - 18)  SpO2: 96% (08 Oct 2022 08:25) (93% - 97%)    Parameters below as of 08 Oct 2022 08:25  Patient On (Oxygen Delivery Method): room air      Neurologic:  -Mental status: Awake, alert, oriented to person, place, and time. Speech is fluent with intact naming, repetition, and comprehension, no dysarthria. Recent and remote memory intact. Follows commands. Attention/concentration intact. Fund of knowledge appropriate.  -Cranial nerves:   II: Visual fields are full to confrontation.  III, IV, VI: Extraocular movements are intact without nystagmus. Pupils equally round and reactive to light  V:  Facial sensation V1-V3 equal and intact   VII: Face is symmetric with normal eye closure and smile  VIII: Hearing is grossly intact   XII: Tongue protrudes midline  Motor: Normal bulk and tone. No pronator drift. Strength bilateral upper extremity 5/5, bilateral lower extremities 5/5.  Sensation: Intact to light touch bilaterally. No neglect or extinction on double simultaneous testing.  Coordination: No dysmetria on finger-to-nose and heel-to-shin bilaterally    LABS:                        12.2   12.25 )-----------( 355      ( 08 Oct 2022 06:10 )             37.1     10-08    137  |  103  |  10  ----------------------------<  93  3.9   |  24  |  0.67    Ca    8.5      08 Oct 2022 06:10  Phos  3.2     10-08  Mg     2.2     10-08            RADIOLOGY & ADDITIONAL TESTS:    Cerebral Angiogram 10/6  · Operative Findings	Femoral cerebral angiogram performed under GA via right CFA using a 4Fr sheath. Bilateral cerebral angiogram performed, including b/l ICA, ECA, and vertebral artery injections. No cerebral aneurysm, AVM or dural fistula appreciated. Some areas of vessel irregularity/narrowing in the ESTHER and MCA branches, both proximal and distal, as well as some narrowing of the vertebrobasilar junction, right > left. This could represent vasculitis. RIght groin manually compressed for 15 minutes with hemostasis obtained. Patient extubated and will recover in NSICU.    Full report to follow, d/w Dr. Flores.

## 2022-10-08 NOTE — PROGRESS NOTE ADULT - NS ATTEND AMEND GEN_ALL_CORE FT
Patient with normal protein and glucose on csf with 1 wbc and 1100 + RBC but due to traumatic tap.   The lack of elevated protein makes me consider her condition more consistent with RCVS and as such will start her on Verapamil 120 XR.   Will review remainder of CSF and rheum work up once it is back.   Cont keppra for SAH

## 2022-10-08 NOTE — CONSULT NOTE ADULT - SUBJECTIVE AND OBJECTIVE BOX
HPI:  43 yo female no pmh presenting with headache x 1 week. Pt also complaining of intermittent left eye pain with blurry vision x 3 months. CTA reveals small volume of SAH in the right temporal/occipital sulci. CTH reveals focal areas of narrowing involving R A2 and L A3 segments suspicious for vasculitis and L pcomm infundibulum.  Cerebral angiogram demonstrates areas of irregularity and narrowing in ESTHER and MCA branches proximally and distally likely representing vasculitis. Now pending further workup of vasculitis. Medicine following for comanagement.       ROS: A 10-point review of systems was otherwise negative.    PAST MEDICAL & SURGICAL HISTORY:  Subarachnoid hemorrhage, nontraumatic          SOCIAL HISTORY:  FAMILY HISTORY:      ALLERGIES: 	  No Known Allergies            MEDICATIONS:  bisacodyl 5 milliGRAM(s) Oral at bedtime  enoxaparin Injectable 40 milliGRAM(s) SubCutaneous every 24 hours  insulin lispro (ADMELOG) corrective regimen sliding scale   SubCutaneous Before meals and at bedtime  ondansetron Injectable 4 milliGRAM(s) IV Push every 6 hours PRN  polyethylene glycol 3350 17 Gram(s) Oral daily  senna 2 Tablet(s) Oral at bedtime  traMADol 100 milliGRAM(s) Oral every 6 hours  valproate sodium  IVPB 500 milliGRAM(s) IV Intermittent every 8 hours  verapamil  milliGRAM(s) Oral daily      PHYSICAL EXAM:  T(C): 36.7 (10-08-22 @ 10:00), Max: 36.8 (10-08-22 @ 02:00)  HR: 78 (10-08-22 @ 09:40) (65 - 78)  BP: 116/70 (10-08-22 @ 09:40) (110/69 - 127/75)  RR: 18 (10-08-22 @ 09:40) (16 - 18)  SpO2: 96% (10-08-22 @ 09:40) (93% - 97%)  Wt(kg): --    GEN: Awake, comfortable. NAD.   HEENT: NCAT, PERRL, EOMI. Mucosa moist. No JVD.   RESP: CTA b/l  CV: RRR, normal s1/s2. No m/r/g.  ABD: Soft, NTND. BS+  EXT: Warm. No edema, clubbing, or cyanosis.   NEURO: AAOx3. No focal deficits.    I&O's Summary    07 Oct 2022 07:01  -  08 Oct 2022 07:00  --------------------------------------------------------  IN: 1360 mL / OUT: 1800 mL / NET: -440 mL        	  LABS:	 	    CARDIAC MARKERS:                                  12.2   12.25 )-----------( 355      ( 08 Oct 2022 06:10 )             37.1     10-08    137  |  103  |  10  ----------------------------<  93  3.9   |  24  |  0.67    Ca    8.5      08 Oct 2022 06:10  Phos  3.2     10-08  Mg     2.2     10-08

## 2022-10-08 NOTE — PROGRESS NOTE ADULT - ASSESSMENT
41 yo female no PMHx presenting with headache x 1 week. CTH reveals small volume of SAH in the right temporal/occipital sulci. CTA reveals focal areas of narrowing involving R A2 and L A3 segments and L pcomm infundibulum. Admitted to NSICU for close monitoring. Cerebral angiogram demonstrates areas of irregularity and narrowing in ESTHER and MCA branches proximally and distally likely representing vasculitis.     Neuro  #vasculitis/SAH  - holding all AC and antithrombotics for now  - kera dc'ed per NSGY  - initiated Verapamil 120mg SR QD today  - q4hr stroke neuro checks and vitals  - f/u MRI brain with and without contrast read  - LP performed on 10/7 - follow up results  - HCT Results: Small volume of subarachnoid hemorrhage within posterior right temporal and occipital sulci.  - CTA Results: Focal areas of narrowing involving the right A2 and left A3 segments which may represent vasculitis. Left posterior communicating artery infundibulum.  - cerebral angiogram performed 10/6 - Some areas of vessel irregularity/narrowing in the ESTHER and MCA branches, both proximal and distal, as well as some narrowing of the vertebrobasilar junction, right > left. This could represent vasculitis.  - Stroke education    Cards  #HTN  - SBP <160  - TTE with aortic sclerosis, EF 70%  - Stroke Code EKG Results:    #HLD  - LDL results: 83    Pulm  - call provider if SPO2 < 94%    GI  #Nutrition/Fluids/Electrolytes   - replete K<4 and Mg <2  - Diet: Regular    Renal  - daily BMP    Endocrine  - A1C results: 5.5%      - TSH results:    DVT Prophylaxis  - lovenox sq for DVT prophylaxis   - SCDs for DVT prophylaxis     Dispo: pending PT/OT eval     Discussed daily hospital plans and goals with patient and family at bedside.  Discussed with Neurology Attending Dr. Marcela Valiente 41 yo female no PMHx presenting with headache x 1 week. CTH reveals small volume of SAH in the right temporal/occipital sulci. CTA reveals focal areas of narrowing involving R A2 and L A3 segments and L pcomm infundibulum. Admitted to NSICU for close monitoring. Cerebral angiogram demonstrates areas of irregularity and narrowing in ESTHER and MCA branches proximally and distally likely representing vasculitis.     Neuro  #vasculitis/SAH  - holding all AC and antithrombotics for now  - keppra dc'ed per NSGY  - initiated Verapamil 120mg SR QD today  - tramadol discontinued. will continue valproate and fiorcet for now  - q4hr stroke neuro checks and vitals  - f/u MRI brain with and without contrast read  - LP performed on 10/7 - follow up results  - HCT Results: Small volume of subarachnoid hemorrhage within posterior right temporal and occipital sulci.  - CTA Results: Focal areas of narrowing involving the right A2 and left A3 segments which may represent vasculitis. Left posterior communicating artery infundibulum.  - cerebral angiogram performed 10/6 - Some areas of vessel irregularity/narrowing in the ESTHER and MCA branches, both proximal and distal, as well as some narrowing of the vertebrobasilar junction, right > left. This could represent vasculitis.  - Stroke education    Cards  #HTN  - SBP <160  - TTE with aortic sclerosis, EF 70%  - Stroke Code EKG Results:    #HLD  - LDL results: 83    Pulm  - call provider if SPO2 < 94%    GI  #Nutrition/Fluids/Electrolytes   - replete K<4 and Mg <2  - Diet: Regular    Renal  - daily BMP    Endocrine  - A1C results: 5.5%      - TSH results:    DVT Prophylaxis  - lovenox sq for DVT prophylaxis   - SCDs for DVT prophylaxis     Dispo: pending PT/OT eval     Discussed daily hospital plans and goals with patient and family at bedside.  Discussed with Neurology Attending Dr. Marcela Valiente

## 2022-10-08 NOTE — CONSULT NOTE ADULT - ASSESSMENT
43 yo female no pmh presenting with headache x 1 week. Pt also complaining of intermittent left eye pain with blurry vision x 3 months. CTA reveals small volume of SAH in the right temporal/occipital sulci. CTH reveals focal areas of narrowing involving R A2 and L A3 segments suspicious for vasculitis and L pcomm infundibulum.  Cerebral angiogram demonstrates areas of irregularity and narrowing in ESTHER and MCA branches proximally and distally likely representing vasculitis.     # Possible vasculitis + SAH   - Currently being worked up for possible vasculitis causing CT findings and presentation   - She is now s/p LP and MRI. Pending results   - c/w verapamil 120mg qd and Valproate per Neurology  - Rheum following for vasculitis workup. BILL 1: 640, homogenous pattern but per Rheum, likely not Lupus related   - Defer management to primary team     # Medical optimization   - BG have been well controlled. A1c 5.5%. Can discontinue FSG for now as BG have remained WNR   - BP has remained around 110-120's. BP goals per Neuro  - LDL around 83 without any medications. No need for statin at this time as her event is likely not from atherosclerotic dz     DVT Prophylaxis: SCD's

## 2022-10-09 LAB
ANION GAP SERPL CALC-SCNC: 10 MMOL/L — SIGNIFICANT CHANGE UP (ref 5–17)
BUN SERPL-MCNC: 13 MG/DL — SIGNIFICANT CHANGE UP (ref 7–23)
CALCIUM SERPL-MCNC: 8.8 MG/DL — SIGNIFICANT CHANGE UP (ref 8.4–10.5)
CHLORIDE SERPL-SCNC: 104 MMOL/L — SIGNIFICANT CHANGE UP (ref 96–108)
CO2 SERPL-SCNC: 23 MMOL/L — SIGNIFICANT CHANGE UP (ref 22–31)
CREAT SERPL-MCNC: 0.7 MG/DL — SIGNIFICANT CHANGE UP (ref 0.5–1.3)
EGFR: 110 ML/MIN/1.73M2 — SIGNIFICANT CHANGE UP
GLUCOSE BLDC GLUCOMTR-MCNC: 103 MG/DL — HIGH (ref 70–99)
GLUCOSE BLDC GLUCOMTR-MCNC: 106 MG/DL — HIGH (ref 70–99)
GLUCOSE BLDC GLUCOMTR-MCNC: 84 MG/DL — SIGNIFICANT CHANGE UP (ref 70–99)
GLUCOSE BLDC GLUCOMTR-MCNC: 99 MG/DL — SIGNIFICANT CHANGE UP (ref 70–99)
GLUCOSE SERPL-MCNC: 82 MG/DL — SIGNIFICANT CHANGE UP (ref 70–99)
HCT VFR BLD CALC: 39.2 % — SIGNIFICANT CHANGE UP (ref 34.5–45)
HGB BLD-MCNC: 12.8 G/DL — SIGNIFICANT CHANGE UP (ref 11.5–15.5)
MAGNESIUM SERPL-MCNC: 2.3 MG/DL — SIGNIFICANT CHANGE UP (ref 1.6–2.6)
MCHC RBC-ENTMCNC: 28.7 PG — SIGNIFICANT CHANGE UP (ref 27–34)
MCHC RBC-ENTMCNC: 32.7 GM/DL — SIGNIFICANT CHANGE UP (ref 32–36)
MCV RBC AUTO: 87.9 FL — SIGNIFICANT CHANGE UP (ref 80–100)
NRBC # BLD: 0 /100 WBCS — SIGNIFICANT CHANGE UP (ref 0–0)
PHOSPHATE SERPL-MCNC: 3.6 MG/DL — SIGNIFICANT CHANGE UP (ref 2.5–4.5)
PLATELET # BLD AUTO: 368 K/UL — SIGNIFICANT CHANGE UP (ref 150–400)
POTASSIUM SERPL-MCNC: 3.9 MMOL/L — SIGNIFICANT CHANGE UP (ref 3.5–5.3)
POTASSIUM SERPL-SCNC: 3.9 MMOL/L — SIGNIFICANT CHANGE UP (ref 3.5–5.3)
RBC # BLD: 4.46 M/UL — SIGNIFICANT CHANGE UP (ref 3.8–5.2)
RBC # FLD: 13 % — SIGNIFICANT CHANGE UP (ref 10.3–14.5)
SODIUM SERPL-SCNC: 137 MMOL/L — SIGNIFICANT CHANGE UP (ref 135–145)
WBC # BLD: 13.31 K/UL — HIGH (ref 3.8–10.5)
WBC # FLD AUTO: 13.31 K/UL — HIGH (ref 3.8–10.5)

## 2022-10-09 PROCEDURE — 99232 SBSQ HOSP IP/OBS MODERATE 35: CPT

## 2022-10-09 RX ORDER — VERAPAMIL HCL 240 MG
40 CAPSULE, EXTENDED RELEASE PELLETS 24 HR ORAL DAILY
Refills: 0 | Status: DISCONTINUED | OUTPATIENT
Start: 2022-10-09 | End: 2022-10-09

## 2022-10-09 RX ORDER — SODIUM CHLORIDE 9 MG/ML
500 INJECTION INTRAMUSCULAR; INTRAVENOUS; SUBCUTANEOUS ONCE
Refills: 0 | Status: COMPLETED | OUTPATIENT
Start: 2022-10-09 | End: 2022-10-09

## 2022-10-09 RX ORDER — ACETAMINOPHEN 500 MG
650 TABLET ORAL EVERY 6 HOURS
Refills: 0 | Status: DISCONTINUED | OUTPATIENT
Start: 2022-10-09 | End: 2022-10-12

## 2022-10-09 RX ORDER — VERAPAMIL HCL 240 MG
40 CAPSULE, EXTENDED RELEASE PELLETS 24 HR ORAL EVERY 12 HOURS
Refills: 0 | Status: DISCONTINUED | OUTPATIENT
Start: 2022-10-09 | End: 2022-10-12

## 2022-10-09 RX ORDER — OXYCODONE AND ACETAMINOPHEN 5; 325 MG/1; MG/1
1 TABLET ORAL ONCE
Refills: 0 | Status: DISCONTINUED | OUTPATIENT
Start: 2022-10-09 | End: 2022-10-09

## 2022-10-09 RX ADMIN — OXYCODONE AND ACETAMINOPHEN 1 TABLET(S): 5; 325 TABLET ORAL at 10:47

## 2022-10-09 RX ADMIN — Medication 650 MILLIGRAM(S): at 23:20

## 2022-10-09 RX ADMIN — ENOXAPARIN SODIUM 40 MILLIGRAM(S): 100 INJECTION SUBCUTANEOUS at 22:34

## 2022-10-09 RX ADMIN — Medication 40 MILLIGRAM(S): at 12:20

## 2022-10-09 RX ADMIN — OXYCODONE AND ACETAMINOPHEN 1 TABLET(S): 5; 325 TABLET ORAL at 12:25

## 2022-10-09 RX ADMIN — SODIUM CHLORIDE 500 MILLILITER(S): 9 INJECTION INTRAMUSCULAR; INTRAVENOUS; SUBCUTANEOUS at 04:42

## 2022-10-09 RX ADMIN — Medication 55 MILLIGRAM(S): at 07:57

## 2022-10-09 RX ADMIN — Medication 650 MILLIGRAM(S): at 07:57

## 2022-10-09 RX ADMIN — Medication 55 MILLIGRAM(S): at 18:26

## 2022-10-09 RX ADMIN — Medication 650 MILLIGRAM(S): at 22:34

## 2022-10-09 RX ADMIN — Medication 650 MILLIGRAM(S): at 07:18

## 2022-10-09 NOTE — PROGRESS NOTE ADULT - SUBJECTIVE AND OBJECTIVE BOX
Neurology Progress Note    Interval History:    Pt hypotensive overnight, received 500cc IVF bolus. Continues to complain of headache. Received Percocet this morning. Will monitor for improvement.    Medications:  acetaminophen     Tablet .. 650 milliGRAM(s) Oral every 6 hours PRN  bisacodyl 5 milliGRAM(s) Oral at bedtime  enoxaparin Injectable 40 milliGRAM(s) SubCutaneous every 24 hours  insulin lispro (ADMELOG) corrective regimen sliding scale   SubCutaneous Before meals and at bedtime  ondansetron Injectable 4 milliGRAM(s) IV Push every 6 hours PRN  oxycodone    5 mG/acetaminophen 325 mG 1 Tablet(s) Oral once  polyethylene glycol 3350 17 Gram(s) Oral daily  senna 2 Tablet(s) Oral at bedtime  valproate sodium  IVPB 500 milliGRAM(s) IV Intermittent <User Schedule>  verapamil  milliGRAM(s) Oral daily      Vital Signs Last 24 Hrs  T(C): 37.4 (09 Oct 2022 06:00), Max: 37.7 (08 Oct 2022 17:31)  T(F): 99.4 (09 Oct 2022 06:00), Max: 99.9 (08 Oct 2022 17:31)  HR: 76 (09 Oct 2022 08:55) (72 - 82)  BP: 96/61 (09 Oct 2022 08:55) (91/52 - 118/75)  BP(mean): 74 (09 Oct 2022 08:55) (65 - 92)  RR: 18 (09 Oct 2022 08:55) (17 - 20)  SpO2: 96% (09 Oct 2022 08:55) (90% - 98%)    Parameters below as of 09 Oct 2022 08:55  Patient On (Oxygen Delivery Method): room air        Neurological Examination:  General:  Appearance is consistent with chronologic age.  No abnormal facies.  Gross skin survey within normal limits.    Cognitive/Language:  Awake, alert, and oriented to person, place, time and date.  Recent and remote memory intact.  Fund of knowledge is appropriate.  Naming, repetition and comprehension intact.   Nondysarthric.    Cranial Nerves  - Eyes: Visual acuity intact, Visual fields full.  EOMI w/o nystagmus, skew or reported double vision.  PERRL.  No ptosis/weakness of eyelid closure.    - Face:  Facial sensation normal V1 - 3, no facial asymmetry.    - Ears/Nose/Throat:  Hearing grossly intact b/l to finger rub.  Palate elevates midline.  Tongue and uvula midline.   Motor examination:  Upper Extremities: L 5/5, R 5/5; Lower extremities: L 5/5, R 5/5.  No observable drift. Normal tone and bulk. No tenderness, twitching, tremors or involuntary movements.  Sensory examination:   Intact to light touch throughout  Cerebellum:   FTN/HKS intact.  No dysmetria or dysdiadokinesia.      Labs:  CBC Full  -  ( 09 Oct 2022 05:30 )  WBC Count : 13.31 K/uL  RBC Count : 4.46 M/uL  Hemoglobin : 12.8 g/dL  Hematocrit : 39.2 %  Platelet Count - Automated : 368 K/uL  Mean Cell Volume : 87.9 fl  Mean Cell Hemoglobin : 28.7 pg  Mean Cell Hemoglobin Concentration : 32.7 gm/dL  Auto Neutrophil # : x  Auto Lymphocyte # : x  Auto Monocyte # : x  Auto Eosinophil # : x  Auto Basophil # : x  Auto Neutrophil % : x  Auto Lymphocyte % : x  Auto Monocyte % : x  Auto Eosinophil % : x  Auto Basophil % : x    10-09    137  |  104  |  13  ----------------------------<  82  3.9   |  23  |  0.70    Ca    8.8      09 Oct 2022 05:30  Phos  3.6     10-09  Mg     2.3     10-09

## 2022-10-09 NOTE — PROGRESS NOTE ADULT - ASSESSMENT
42F with no PMH presented with headache x 1 week and intermittent left eye pain with blurry vision x 3 months. CTH showed small volume of SAH in the right temporal/occipital sulci. CTA showed focal areas of narrowing involving R A2 and L A3 segments suspicious for vasculitis and L pcomm infundibulum. Differential includes vasculitis vs RCVS.    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  - CTA Results: Focal areas of narrowing involving the right A2 and left A3 segments which may represent vasculitis. Left posterior communicating artery infundibulum.  - cerebral angiogram performed 10/6 - Some areas of vessel irregularity/narrowing in the ESTHER and MCA branches, both proximal and distal, as well as some narrowing of the vertebrobasilar junction, right > left. This could represent vasculitis.  - MR Brain: 1. Nonspecific few punctate foci of T2 and Flair signal hyperintensities within the white matter; findings may be seen in patient with migraine headaches, vasculitis, microvascular disease, demyelinating disease, Lyme disease and viral illness. 2. No evidence of acute infarction. 3. Small subcentimeter left frontal meningioma  - Keppra dc'ed per NSGY  - Headache: c/w Verapamil 120mg qd, c/w Valproate  - Q4hr stroke neuro checks and vitals  - LP performed (10/7): normal glucose and protein. Remainder pending, f/u  - Autoimmune panel: positive BILL, remainder pending, f/u  - F/u Rheumatology recs  - Stroke education    Cardiovascular  - SBP <160  - TTE with aortic sclerosis, EF 70%  - Stroke Code EKG Results:  - LDL results: 83    Pulm  - Call provider if SPO2 < 94%    Renal  - Daily BMP    Endocrine  - A1C results: 5.5%  - TSH results:    Misc  - Diet: Regular  - Activity: IAT  - DVT Prophylaxis: Lovenox, SCDs  - GI Prophylaxis: none  - Code Status: full code  - Disposition: pending further evaluation

## 2022-10-09 NOTE — PROGRESS NOTE ADULT - SUBJECTIVE AND OBJECTIVE BOX
INTERVAL EVENTS: Slight HA on left side. Otherwise just had a bowel movement and feels better.     PAST MEDICAL & SURGICAL HISTORY:  Subarachnoid hemorrhage, nontraumatic        MEDICATIONS  (STANDING):  bisacodyl 5 milliGRAM(s) Oral at bedtime  enoxaparin Injectable 40 milliGRAM(s) SubCutaneous every 24 hours  insulin lispro (ADMELOG) corrective regimen sliding scale   SubCutaneous Before meals and at bedtime  polyethylene glycol 3350 17 Gram(s) Oral daily  senna 2 Tablet(s) Oral at bedtime  valproate sodium  IVPB 500 milliGRAM(s) IV Intermittent <User Schedule>  verapamil 40 milliGRAM(s) Oral every 12 hours    MEDICATIONS  (PRN):  acetaminophen     Tablet .. 650 milliGRAM(s) Oral every 6 hours PRN Mild Pain (1 - 3)  ondansetron Injectable 4 milliGRAM(s) IV Push every 6 hours PRN Nausea and/or Vomiting    Vital Signs Last 24 Hrs  T(C): 37.1 (09 Oct 2022 10:45), Max: 37.7 (08 Oct 2022 17:31)  T(F): 98.8 (09 Oct 2022 10:45), Max: 99.9 (08 Oct 2022 17:31)  HR: 76 (09 Oct 2022 08:55) (72 - 82)  BP: 96/61 (09 Oct 2022 08:55) (91/52 - 117/64)  BP(mean): 74 (09 Oct 2022 08:55) (65 - 85)  RR: 18 (09 Oct 2022 08:55) (17 - 20)  SpO2: 96% (09 Oct 2022 08:55) (90% - 98%)    Parameters below as of 09 Oct 2022 08:55  Patient On (Oxygen Delivery Method): room air        PHYSICAL EXAM:  GEN: Awake, alert. NAD.   HEENT: NCAT, PERRL, EOMI. Mucosa moist. No JVD.  RESP: CTA b/l  CV: RRR. Normal S1/S2. No m/r/g.  ABD: Soft. NT/ND. BS+  EXT: Warm. No edema, clubbing, or cyanosis.   NEURO: AAOx3. No focal deficits.     LABS:                        12.8   13.31 )-----------( 368      ( 09 Oct 2022 05:30 )             39.2     10-09    137  |  104  |  13  ----------------------------<  82  3.9   |  23  |  0.70    Ca    8.8      09 Oct 2022 05:30  Phos  3.6     10-09  Mg     2.3     10-09      CARDIAC MARKERS ( 08 Oct 2022 12:03 )  x     / <0.01 ng/mL / x     / x     / x              I&O's Summary    08 Oct 2022 07:01  -  09 Oct 2022 07:00  --------------------------------------------------------  IN: 0 mL / OUT: 1400 mL / NET: -1400 mL      RADIOLOGY & ADDITIONAL STUDIES:  TELEMETRY:  EKG:

## 2022-10-09 NOTE — PROGRESS NOTE ADULT - ASSESSMENT
43 yo female no pmh presenting with headache x 1 week. Pt also complaining of intermittent left eye pain with blurry vision x 3 months. CTA reveals small volume of SAH in the right temporal/occipital sulci. CTH reveals focal areas of narrowing involving R A2 and L A3 segments suspicious for vasculitis and L pcomm infundibulum.  Cerebral angiogram demonstrates areas of irregularity and narrowing in ESTHER and MCA branches proximally and distally likely representing vasculitis.     # Possible vasculitis + SAH   - Currently being worked up for possible vasculitis causing CT findings and presentation   - She is now s/p LP and MRI. Pending results   - c/w verapamil and Valproate per Neurology  - Rheum following for vasculitis workup. BILL 1: 640, homogenous pattern but per Rheum, likely not Lupus related   - Defer management to primary team     # Medical optimization   - BG have been well controlled. A1c 5.5%. Can discontinue FSG for now as BG have remained WNR   - BP has remained around 110-120's. BP goals per Neuro  - LDL around 83 without any medications. No need for statin at this time as her event is likely not from atherosclerotic dz     DVT Prophylaxis: SCD's

## 2022-10-10 DIAGNOSIS — I60.9 NONTRAUMATIC SUBARACHNOID HEMORRHAGE, UNSPECIFIED: ICD-10-CM

## 2022-10-10 DIAGNOSIS — R93.0 ABNORMAL FINDINGS ON DIAGNOSTIC IMAGING OF SKULL AND HEAD, NOT ELSEWHERE CLASSIFIED: ICD-10-CM

## 2022-10-10 LAB
ACE SERPL-CCNC: 23 U/L — SIGNIFICANT CHANGE UP (ref 14–82)
ANION GAP SERPL CALC-SCNC: 10 MMOL/L — SIGNIFICANT CHANGE UP (ref 5–17)
BUN SERPL-MCNC: 12 MG/DL — SIGNIFICANT CHANGE UP (ref 7–23)
CALCIUM SERPL-MCNC: 9 MG/DL — SIGNIFICANT CHANGE UP (ref 8.4–10.5)
CHLORIDE SERPL-SCNC: 106 MMOL/L — SIGNIFICANT CHANGE UP (ref 96–108)
CO2 SERPL-SCNC: 22 MMOL/L — SIGNIFICANT CHANGE UP (ref 22–31)
CREAT SERPL-MCNC: 0.59 MG/DL — SIGNIFICANT CHANGE UP (ref 0.5–1.3)
CRYOGLOB SERPL-MCNC: NEGATIVE — SIGNIFICANT CHANGE UP
EGFR: 115 ML/MIN/1.73M2 — SIGNIFICANT CHANGE UP
GLUCOSE BLDC GLUCOMTR-MCNC: 116 MG/DL — HIGH (ref 70–99)
GLUCOSE BLDC GLUCOMTR-MCNC: 128 MG/DL — HIGH (ref 70–99)
GLUCOSE BLDC GLUCOMTR-MCNC: 220 MG/DL — HIGH (ref 70–99)
GLUCOSE BLDC GLUCOMTR-MCNC: 97 MG/DL — SIGNIFICANT CHANGE UP (ref 70–99)
GLUCOSE BLDC GLUCOMTR-MCNC: 98 MG/DL — SIGNIFICANT CHANGE UP (ref 70–99)
GLUCOSE SERPL-MCNC: 106 MG/DL — HIGH (ref 70–99)
HCT VFR BLD CALC: 37.8 % — SIGNIFICANT CHANGE UP (ref 34.5–45)
HGB BLD-MCNC: 12.3 G/DL — SIGNIFICANT CHANGE UP (ref 11.5–15.5)
INNER EAR 68KD AB FLD QL: <1.5 U/L — SIGNIFICANT CHANGE UP (ref 0–2.5)
MAGNESIUM SERPL-MCNC: 2.4 MG/DL — SIGNIFICANT CHANGE UP (ref 1.6–2.6)
MCHC RBC-ENTMCNC: 28.3 PG — SIGNIFICANT CHANGE UP (ref 27–34)
MCHC RBC-ENTMCNC: 32.5 GM/DL — SIGNIFICANT CHANGE UP (ref 32–36)
MCV RBC AUTO: 86.9 FL — SIGNIFICANT CHANGE UP (ref 80–100)
NON-GYNECOLOGICAL CYTOLOGY STUDY: SIGNIFICANT CHANGE UP
NRBC # BLD: 0 /100 WBCS — SIGNIFICANT CHANGE UP (ref 0–0)
PHOSPHATE SERPL-MCNC: 3.7 MG/DL — SIGNIFICANT CHANGE UP (ref 2.5–4.5)
PLATELET # BLD AUTO: 366 K/UL — SIGNIFICANT CHANGE UP (ref 150–400)
POTASSIUM SERPL-MCNC: 4.3 MMOL/L — SIGNIFICANT CHANGE UP (ref 3.5–5.3)
POTASSIUM SERPL-SCNC: 4.3 MMOL/L — SIGNIFICANT CHANGE UP (ref 3.5–5.3)
RBC # BLD: 4.35 M/UL — SIGNIFICANT CHANGE UP (ref 3.8–5.2)
RBC # FLD: 13.1 % — SIGNIFICANT CHANGE UP (ref 10.3–14.5)
SODIUM SERPL-SCNC: 138 MMOL/L — SIGNIFICANT CHANGE UP (ref 135–145)
VDRL CSF-TITR: SIGNIFICANT CHANGE UP
WBC # BLD: 11.44 K/UL — HIGH (ref 3.8–10.5)
WBC # FLD AUTO: 11.44 K/UL — HIGH (ref 3.8–10.5)

## 2022-10-10 PROCEDURE — 99233 SBSQ HOSP IP/OBS HIGH 50: CPT

## 2022-10-10 RX ORDER — DEXAMETHASONE 0.5 MG/5ML
4 ELIXIR ORAL ONCE
Refills: 0 | Status: DISCONTINUED | OUTPATIENT
Start: 2022-10-10 | End: 2022-10-10

## 2022-10-10 RX ORDER — DEXAMETHASONE 0.5 MG/5ML
4 ELIXIR ORAL ONCE
Refills: 0 | Status: COMPLETED | OUTPATIENT
Start: 2022-10-10 | End: 2022-10-10

## 2022-10-10 RX ORDER — LANOLIN ALCOHOL/MO/W.PET/CERES
5 CREAM (GRAM) TOPICAL AT BEDTIME
Refills: 0 | Status: DISCONTINUED | OUTPATIENT
Start: 2022-10-10 | End: 2022-10-12

## 2022-10-10 RX ORDER — SODIUM CHLORIDE 9 MG/ML
250 INJECTION INTRAMUSCULAR; INTRAVENOUS; SUBCUTANEOUS ONCE
Refills: 0 | Status: COMPLETED | OUTPATIENT
Start: 2022-10-10 | End: 2022-10-10

## 2022-10-10 RX ORDER — MAGNESIUM SULFATE 500 MG/ML
2 VIAL (ML) INJECTION ONCE
Refills: 0 | Status: COMPLETED | OUTPATIENT
Start: 2022-10-10 | End: 2022-10-10

## 2022-10-10 RX ORDER — METOCLOPRAMIDE HCL 10 MG
10 TABLET ORAL ONCE
Refills: 0 | Status: COMPLETED | OUTPATIENT
Start: 2022-10-10 | End: 2022-10-10

## 2022-10-10 RX ORDER — OXYCODONE AND ACETAMINOPHEN 5; 325 MG/1; MG/1
1 TABLET ORAL ONCE
Refills: 0 | Status: DISCONTINUED | OUTPATIENT
Start: 2022-10-10 | End: 2022-10-10

## 2022-10-10 RX ADMIN — POLYETHYLENE GLYCOL 3350 17 GRAM(S): 17 POWDER, FOR SOLUTION ORAL at 14:03

## 2022-10-10 RX ADMIN — ENOXAPARIN SODIUM 40 MILLIGRAM(S): 100 INJECTION SUBCUTANEOUS at 23:42

## 2022-10-10 RX ADMIN — Medication 650 MILLIGRAM(S): at 06:28

## 2022-10-10 RX ADMIN — Medication 650 MILLIGRAM(S): at 17:54

## 2022-10-10 RX ADMIN — Medication 650 MILLIGRAM(S): at 12:30

## 2022-10-10 RX ADMIN — Medication 10 MILLIGRAM(S): at 18:17

## 2022-10-10 RX ADMIN — Medication 55 MILLIGRAM(S): at 01:00

## 2022-10-10 RX ADMIN — Medication 5 MILLIGRAM(S): at 23:25

## 2022-10-10 RX ADMIN — SENNA PLUS 2 TABLET(S): 8.6 TABLET ORAL at 23:24

## 2022-10-10 RX ADMIN — Medication 55 MILLIGRAM(S): at 17:54

## 2022-10-10 RX ADMIN — Medication 25 GRAM(S): at 17:54

## 2022-10-10 RX ADMIN — Medication 4 MILLIGRAM(S): at 18:13

## 2022-10-10 RX ADMIN — SODIUM CHLORIDE 250 MILLILITER(S): 9 INJECTION INTRAMUSCULAR; INTRAVENOUS; SUBCUTANEOUS at 01:53

## 2022-10-10 RX ADMIN — OXYCODONE AND ACETAMINOPHEN 1 TABLET(S): 5; 325 TABLET ORAL at 12:29

## 2022-10-10 RX ADMIN — Medication 55 MILLIGRAM(S): at 07:26

## 2022-10-10 RX ADMIN — Medication 40 MILLIGRAM(S): at 13:39

## 2022-10-10 RX ADMIN — OXYCODONE AND ACETAMINOPHEN 1 TABLET(S): 5; 325 TABLET ORAL at 07:25

## 2022-10-10 NOTE — PROGRESS NOTE ADULT - NS ATTEND AMEND GEN_ALL_CORE FT
The patient is a 42-year-old female presenting with persistent headache x1 week associated with left eye pain and blurry vision.  CTA showed a right temporal occipital SAH with CTA/angiogram showing multifocal areas of narrowing concerning for vasculitis. MRI negative for acute ischemia but did confirm R temporal SAH. CSF was noninflammatory. BILL +, unlikely related per rheum. Ddx also includes RCVS. Continue verapamil, valproate. Hold antithrombotics. Monitor for dizziness, headaches.

## 2022-10-10 NOTE — PROGRESS NOTE ADULT - ASSESSMENT
42F with no PMH presented with headache x 1 week and intermittent left eye pain with blurry vision x 3 months. CTH showed small volume of SAH in the right temporal/occipital sulci. CTA showed focal areas of narrowing involving R A2 and L A3 segments suspicious for vasculitis and L pcomm infundibulum. Differential includes vasculitis vs RCVS.    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  - CTA Results: Focal areas of narrowing involving the right A2 and left A3 segments which may represent vasculitis. Left posterior communicating artery infundibulum.  - cerebral angiogram performed 10/6 - Some areas of vessel irregularity/narrowing in the ESTHER and MCA branches, both proximal and distal, as well as some narrowing of the vertebrobasilar junction, right > left. This could represent vasculitis.  - MR Brain: 1. Nonspecific few punctate foci of T2 and Flair signal hyperintensities within the white matter; findings may be seen in patient with migraine headaches, vasculitis, microvascular disease, demyelinating disease, Lyme disease and viral illness. 2. No evidence of acute infarction. 3. Small subcentimeter left frontal meningioma  - Keppra dc'ed per NSGY  - Headache: c/w Verapamil 120mg qd, c/w Valproate  - Q4hr stroke neuro checks and vitals  - LP performed (10/7): normal glucose and protein. Remainder pending, f/u  - Autoimmune panel: positive BILL, remainder pending, f/u  - F/u Rheumatology recs  - Stroke education  - PT re-consulted     Cardiovascular  - SBP <160  - TTE with aortic sclerosis, EF 70%  - LDL results: 83    Pulm  - Call provider if SPO2 < 94%    Renal  - Daily BMP    Endocrine  - A1C results: 5.5%  - TSH results:    Misc  - Diet: Regular  - Activity: IAT  - DVT Prophylaxis: Lovenox, SCDs  - GI Prophylaxis: none  - Code Status: full code  - Disposition: home after medical optimization   42F with no PMH presented with headache x 1 week and intermittent left eye pain with blurry vision x 3 months. CTH showed small volume of SAH in the right temporal/occipital sulci. CTA showed focal areas of narrowing involving R A2 and L A3 segments suspicious for vasculitis and L pcomm infundibulum. Differential includes vasculitis vs RCVS.    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  - CTA Results: Focal areas of narrowing involving the right A2 and left A3 segments which may represent vasculitis. Left posterior communicating artery infundibulum.  - cerebral angiogram performed 10/6 - Some areas of vessel irregularity/narrowing in the ESTHER and MCA branches, both proximal and distal, as well as some narrowing of the vertebrobasilar junction, right > left. This could represent vasculitis.  - MR Brain: 1. Nonspecific few punctate foci of T2 and Flair signal hyperintensities within the white matter; findings may be seen in patient with migraine headaches, vasculitis, microvascular disease, demyelinating disease, Lyme disease and viral illness. 2. No evidence of acute infarction. 3. Small subcentimeter left frontal meningioma  - Keppra dc'ed per NSGY  - Headache: c/w Verapamil 120mg qd, c/w Valproate  - Q4hr stroke neuro checks and vitals  - LP performed (10/7): normal glucose and protein. Remainder pending, f/u  - Autoimmune panel: positive BILL, dsDNA <12, anti Rn and Sm <0.2; anti SSA and SSB <0.2,   - CSF LDH 16, Gluc 67, Prot 27  - F/u Rheumatology recs  - Stroke education  - PT re-consulted     Cardiovascular  - SBP <160  - TTE with aortic sclerosis, EF 70%  - LDL results: 83    Pulm  - Call provider if SPO2 < 94%    Renal  - Daily BMP    Endocrine  - A1C results: 5.5%  - TSH results:    Misc  - Diet: Regular  - Activity: IAT  - DVT Prophylaxis: Lovenox, SCDs  - GI Prophylaxis: none  - Code Status: full code  - Disposition: home after medical optimization

## 2022-10-10 NOTE — PROGRESS NOTE ADULT - SUBJECTIVE AND OBJECTIVE BOX
Neurology Stroke Progress Note    INTERVAL HPI/OVERNIGHT EVENTS:  Patient seen and examined. She was sitting comfortably and did not appear in distress. She stated that her last headache was yesterday morning and had no more headache episodes since then. She also denies any lightheadedness, dizziness, N/V/C/D, abd pain, fever or chills.     MEDICATIONS  (STANDING):  bisacodyl 5 milliGRAM(s) Oral at bedtime  enoxaparin Injectable 40 milliGRAM(s) SubCutaneous every 24 hours  insulin lispro (ADMELOG) corrective regimen sliding scale   SubCutaneous Before meals and at bedtime  polyethylene glycol 3350 17 Gram(s) Oral daily  senna 2 Tablet(s) Oral at bedtime  valproate sodium  IVPB 500 milliGRAM(s) IV Intermittent <User Schedule>  verapamil 40 milliGRAM(s) Oral every 12 hours    MEDICATIONS  (PRN):  acetaminophen     Tablet .. 650 milliGRAM(s) Oral every 6 hours PRN Mild Pain (1 - 3)  ondansetron Injectable 4 milliGRAM(s) IV Push every 6 hours PRN Nausea and/or Vomiting    Allergies    No Known Allergies    Intolerances      Vital Signs Last 24 Hrs  T(C): 37 (10 Oct 2022 09:28), Max: 37.3 (09 Oct 2022 22:30)  T(F): 98.6 (10 Oct 2022 09:28), Max: 99.1 (09 Oct 2022 22:30)  HR: 72 (10 Oct 2022 08:35) (62 - 80)  BP: 101/64 (10 Oct 2022 08:35) (84/52 - 119/68)  BP(mean): 78 (10 Oct 2022 08:35) (64 - 86)  RR: 18 (10 Oct 2022 08:35) (17 - 18)  SpO2: 97% (10 Oct 2022 08:35) (95% - 98%)    Parameters below as of 10 Oct 2022 08:35  Patient On (Oxygen Delivery Method): room air        Physical exam:  General: No acute distress, awake and alert  Eyes: Anicteric sclerae, moist conjunctivae, see below for CNs  Neck: trachea midline, FROM, supple, no thyromegaly or lymphadenopathy  Cardiovascular: Regular rate and rhythm, no murmurs, rubs, or gallops. No carotid bruits.   Pulmonary: Anterior breath sounds clear bilaterally, no crackles or wheezing. No use of accessory muscles  GI: Abdomen soft, non-distended, non-tender  Extremities: Radial and DP pulses +2, no edema    Neurologic:  -Mental status: Awake, alert, oriented to person, place, and time. Speech is fluent with intact naming, repetition, and comprehension, no dysarthria. Recent and remote memory intact. Follows commands. Attention/concentration intact. Fund of knowledge appropriate.  -Cranial nerves:   II: Visual fields are full to confrontation.  III, IV, VI: Extraocular movements are intact without nystagmus. Pupils equally round and reactive to light  V:  Facial sensation V1-V3 equal and intact   VII: Face is symmetric with normal eye closure and smile  VIII: Hearing is bilaterally intact to finger rub  IX, X: Uvula is midline and soft palate rises symmetrically  XI: Head turning and shoulder shrug are intact.  XII: Tongue protrudes midline  Motor: Normal bulk and tone. No pronator drift. Strength bilateral upper extremity 5/5, bilateral lower extremities 5/5.  Rapid alternating movements intact and symmetric  Sensation: Intact to light touch bilaterally. No neglect or extinction on double simultaneous testing.  Coordination: No dysmetria on finger-to-nose and heel-to-shin bilaterally  Reflexes: Downgoing toes bilaterally   Gait: Narrow gait and steady    LABS:                        12.3   11.44 )-----------( 366      ( 10 Oct 2022 05:30 )             37.8     10-10    138  |  106  |  12  ----------------------------<  106<H>  4.3   |  22  |  0.59    Ca    9.0      10 Oct 2022 05:30  Phos  3.7     10-10  Mg     2.4     10-10            RADIOLOGY & ADDITIONAL TESTS:

## 2022-10-10 NOTE — PROGRESS NOTE ADULT - SUBJECTIVE AND OBJECTIVE BOX
Patient is a 42y old  Female who presents with a chief complaint of HEADACHE SUBARACHNOIDHEMORRHAGE     (07 Oct 2022 12:36)      INTERVAL HPI/OVERNIGHT EVENTS:    Pt. seen and examined earlier today  Pt. says she's feeling better  No new complaints  OOB to chair    Review of Systems: 12 point review of systems otherwise negative    MEDICATIONS  (STANDING):  bisacodyl 5 milliGRAM(s) Oral at bedtime  enoxaparin Injectable 40 milliGRAM(s) SubCutaneous every 24 hours  insulin lispro (ADMELOG) corrective regimen sliding scale   SubCutaneous Before meals and at bedtime  polyethylene glycol 3350 17 Gram(s) Oral daily  senna 2 Tablet(s) Oral at bedtime  valproate sodium  IVPB 500 milliGRAM(s) IV Intermittent <User Schedule>  verapamil 40 milliGRAM(s) Oral every 12 hours    MEDICATIONS  (PRN):  acetaminophen     Tablet .. 650 milliGRAM(s) Oral every 6 hours PRN Mild Pain (1 - 3)  ondansetron Injectable 4 milliGRAM(s) IV Push every 6 hours PRN Nausea and/or Vomiting      Allergies    No Known Allergies    Intolerances          Vital Signs Last 24 Hrs  T(C): 37.1 (10 Oct 2022 13:22), Max: 37.3 (09 Oct 2022 22:30)  T(F): 98.7 (10 Oct 2022 13:22), Max: 99.1 (09 Oct 2022 22:30)  HR: 72 (10 Oct 2022 12:20) (62 - 72)  BP: 101/58 (10 Oct 2022 12:20) (84/52 - 110/56)  BP(mean): 73 (10 Oct 2022 12:20) (64 - 78)  RR: 18 (10 Oct 2022 12:20) (17 - 18)  SpO2: 97% (10 Oct 2022 12:20) (95% - 98%)    Parameters below as of 10 Oct 2022 12:20  Patient On (Oxygen Delivery Method): room air      CAPILLARY BLOOD GLUCOSE      POCT Blood Glucose.: 116 mg/dL (10 Oct 2022 12:54)  POCT Blood Glucose.: 220 mg/dL (10 Oct 2022 11:43)  POCT Blood Glucose.: 97 mg/dL (10 Oct 2022 06:23)  POCT Blood Glucose.: 106 mg/dL (09 Oct 2022 22:34)  POCT Blood Glucose.: 103 mg/dL (09 Oct 2022 17:22)      10-09 @ 07:01  -  10-10 @ 07:00  --------------------------------------------------------  IN: 50 mL / OUT: 850 mL / NET: -800 mL        Physical Exam:  (earlier today)  Daily     Daily   General:  comfortable-appearing in NAD, sitting in a chair  HEENT:  MMM  CV:  RRR  Lungs:  CTA B/L  Abdomen:  soft NT ND  Extremities:  no edema B/L LE  Skin:  WWP  Neuro:  AAOx3, no dysarthria    LABS:                        12.3   11.44 )-----------( 366      ( 10 Oct 2022 05:30 )             37.8     10-10    138  |  106  |  12  ----------------------------<  106<H>  4.3   |  22  |  0.59    Ca    9.0      10 Oct 2022 05:30  Phos  3.7     10-10  Mg     2.4     10-10

## 2022-10-11 ENCOUNTER — TRANSCRIPTION ENCOUNTER (OUTPATIENT)
Age: 42
End: 2022-10-11

## 2022-10-11 LAB
ALBUMIN CSF-MCNC: 15.3 MG/DL — SIGNIFICANT CHANGE UP (ref 14–25)
ALBUMIN CSF-MCNC: 15.3 MG/DL — SIGNIFICANT CHANGE UP (ref 14–25)
ALBUMIN SERPL ELPH-MCNC: 3546 MG/DL — SIGNIFICANT CHANGE UP (ref 3500–5200)
ALBUMIN SERPL ELPH-MCNC: 3546 MG/DL — SIGNIFICANT CHANGE UP (ref 3500–5200)
ANION GAP SERPL CALC-SCNC: 10 MMOL/L — SIGNIFICANT CHANGE UP (ref 5–17)
BUN SERPL-MCNC: 13 MG/DL — SIGNIFICANT CHANGE UP (ref 7–23)
CALCIUM SERPL-MCNC: 9.2 MG/DL — SIGNIFICANT CHANGE UP (ref 8.4–10.5)
CHLORIDE SERPL-SCNC: 104 MMOL/L — SIGNIFICANT CHANGE UP (ref 96–108)
CO2 SERPL-SCNC: 23 MMOL/L — SIGNIFICANT CHANGE UP (ref 22–31)
CREAT SERPL-MCNC: 0.57 MG/DL — SIGNIFICANT CHANGE UP (ref 0.5–1.3)
EGFR: 116 ML/MIN/1.73M2 — SIGNIFICANT CHANGE UP
GAMMA INTERFERON BACKGROUND BLD IA-ACNC: 0.06 IU/ML — SIGNIFICANT CHANGE UP
GLUCOSE BLDC GLUCOMTR-MCNC: 89 MG/DL — SIGNIFICANT CHANGE UP (ref 70–99)
GLUCOSE BLDC GLUCOMTR-MCNC: 95 MG/DL — SIGNIFICANT CHANGE UP (ref 70–99)
GLUCOSE BLDC GLUCOMTR-MCNC: 99 MG/DL — SIGNIFICANT CHANGE UP (ref 70–99)
GLUCOSE SERPL-MCNC: 111 MG/DL — HIGH (ref 70–99)
HCT VFR BLD CALC: 35.6 % — SIGNIFICANT CHANGE UP (ref 34.5–45)
HGB BLD-MCNC: 11.8 G/DL — SIGNIFICANT CHANGE UP (ref 11.5–15.5)
IGG CSF-MCNC: 3.8 MG/DL — HIGH
IGG CSF-MCNC: 3.8 MG/DL — HIGH
IGG FLD-MCNC: 1447 MG/DL — SIGNIFICANT CHANGE UP (ref 610–1660)
IGG FLD-MCNC: 1447 MG/DL — SIGNIFICANT CHANGE UP (ref 610–1660)
IGG SYNTH RATE SER+CSF CALC-MRATE: -0.5 MG/DAY — SIGNIFICANT CHANGE UP
IGG SYNTH RATE SER+CSF CALC-MRATE: -0.5 MG/DAY — SIGNIFICANT CHANGE UP
IGG/ALB CLEAR SER+CSF-RTO: 0.6 — SIGNIFICANT CHANGE UP
IGG/ALB CLEAR SER+CSF-RTO: 0.6 — SIGNIFICANT CHANGE UP
IGG/ALB CSF: 0.25 RATIO — SIGNIFICANT CHANGE UP
IGG/ALB CSF: 0.25 RATIO — SIGNIFICANT CHANGE UP
IGG/ALB SER: 0.41 RATIO — SIGNIFICANT CHANGE UP
IGG/ALB SER: 0.41 RATIO — SIGNIFICANT CHANGE UP
M TB IFN-G BLD-IMP: NEGATIVE — SIGNIFICANT CHANGE UP
M TB IFN-G CD4+ BCKGRND COR BLD-ACNC: -0.01 IU/ML — SIGNIFICANT CHANGE UP
M TB IFN-G CD4+CD8+ BCKGRND COR BLD-ACNC: -0.02 IU/ML — SIGNIFICANT CHANGE UP
MAGNESIUM SERPL-MCNC: 2.5 MG/DL — SIGNIFICANT CHANGE UP (ref 1.6–2.6)
MCHC RBC-ENTMCNC: 28.6 PG — SIGNIFICANT CHANGE UP (ref 27–34)
MCHC RBC-ENTMCNC: 33.1 GM/DL — SIGNIFICANT CHANGE UP (ref 32–36)
MCV RBC AUTO: 86.4 FL — SIGNIFICANT CHANGE UP (ref 80–100)
NRBC # BLD: 0 /100 WBCS — SIGNIFICANT CHANGE UP (ref 0–0)
PHOSPHATE SERPL-MCNC: 4.6 MG/DL — HIGH (ref 2.5–4.5)
PLATELET # BLD AUTO: 371 K/UL — SIGNIFICANT CHANGE UP (ref 150–400)
POTASSIUM SERPL-MCNC: 4.1 MMOL/L — SIGNIFICANT CHANGE UP (ref 3.5–5.3)
POTASSIUM SERPL-SCNC: 4.1 MMOL/L — SIGNIFICANT CHANGE UP (ref 3.5–5.3)
PROT CSF-MCNC: 29 MG/DL — SIGNIFICANT CHANGE UP (ref 15–45)
QUANT TB PLUS MITOGEN MINUS NIL: 5.79 IU/ML — SIGNIFICANT CHANGE UP
RBC # BLD: 4.12 M/UL — SIGNIFICANT CHANGE UP (ref 3.8–5.2)
RBC # FLD: 13.2 % — SIGNIFICANT CHANGE UP (ref 10.3–14.5)
SODIUM SERPL-SCNC: 137 MMOL/L — SIGNIFICANT CHANGE UP (ref 135–145)
WBC # BLD: 10.95 K/UL — HIGH (ref 3.8–10.5)
WBC # FLD AUTO: 10.95 K/UL — HIGH (ref 3.8–10.5)
WNV IGG CSF IA-ACNC: NEGATIVE — SIGNIFICANT CHANGE UP
WNV IGM CSF IA-ACNC: NEGATIVE — SIGNIFICANT CHANGE UP

## 2022-10-11 PROCEDURE — 99233 SBSQ HOSP IP/OBS HIGH 50: CPT

## 2022-10-11 RX ORDER — GABAPENTIN 400 MG/1
100 CAPSULE ORAL THREE TIMES A DAY
Refills: 0 | Status: DISCONTINUED | OUTPATIENT
Start: 2022-10-11 | End: 2022-10-12

## 2022-10-11 RX ADMIN — Medication 40 MILLIGRAM(S): at 12:06

## 2022-10-11 RX ADMIN — GABAPENTIN 100 MILLIGRAM(S): 400 CAPSULE ORAL at 18:53

## 2022-10-11 RX ADMIN — Medication 55 MILLIGRAM(S): at 07:15

## 2022-10-11 RX ADMIN — POLYETHYLENE GLYCOL 3350 17 GRAM(S): 17 POWDER, FOR SOLUTION ORAL at 12:06

## 2022-10-11 RX ADMIN — SENNA PLUS 2 TABLET(S): 8.6 TABLET ORAL at 22:06

## 2022-10-11 RX ADMIN — Medication 5 MILLIGRAM(S): at 22:07

## 2022-10-11 RX ADMIN — GABAPENTIN 100 MILLIGRAM(S): 400 CAPSULE ORAL at 23:07

## 2022-10-11 RX ADMIN — Medication 55 MILLIGRAM(S): at 19:54

## 2022-10-11 RX ADMIN — Medication 40 MILLIGRAM(S): at 22:06

## 2022-10-11 RX ADMIN — Medication 5 MILLIGRAM(S): at 22:06

## 2022-10-11 RX ADMIN — Medication 650 MILLIGRAM(S): at 07:16

## 2022-10-11 RX ADMIN — Medication 40 MILLIGRAM(S): at 00:52

## 2022-10-11 RX ADMIN — ENOXAPARIN SODIUM 40 MILLIGRAM(S): 100 INJECTION SUBCUTANEOUS at 22:06

## 2022-10-11 RX ADMIN — Medication 650 MILLIGRAM(S): at 09:00

## 2022-10-11 RX ADMIN — GABAPENTIN 100 MILLIGRAM(S): 400 CAPSULE ORAL at 10:43

## 2022-10-11 RX ADMIN — Medication 55 MILLIGRAM(S): at 00:52

## 2022-10-11 NOTE — PROGRESS NOTE ADULT - SUBJECTIVE AND OBJECTIVE BOX
Neurology Stroke Progress Note    INTERVAL HPI/OVERNIGHT EVENTS:  Patient seen and examined.  number ______ used.    MEDICATIONS  (STANDING):  bisacodyl 5 milliGRAM(s) Oral at bedtime  enoxaparin Injectable 40 milliGRAM(s) SubCutaneous every 24 hours  gabapentin 100 milliGRAM(s) Oral three times a day  insulin lispro (ADMELOG) corrective regimen sliding scale   SubCutaneous Before meals and at bedtime  melatonin 5 milliGRAM(s) Oral at bedtime  polyethylene glycol 3350 17 Gram(s) Oral daily  senna 2 Tablet(s) Oral at bedtime  valproate sodium  IVPB 500 milliGRAM(s) IV Intermittent <User Schedule>  verapamil 40 milliGRAM(s) Oral every 12 hours    MEDICATIONS  (PRN):  acetaminophen     Tablet .. 650 milliGRAM(s) Oral every 6 hours PRN Mild Pain (1 - 3)  ondansetron Injectable 4 milliGRAM(s) IV Push every 6 hours PRN Nausea and/or Vomiting    Allergies    No Known Allergies    Intolerances      Vital Signs Last 24 Hrs  T(C): 36.8 (11 Oct 2022 10:00), Max: 37.1 (10 Oct 2022 13:22)  T(F): 98.3 (11 Oct 2022 10:00), Max: 98.7 (10 Oct 2022 13:22)  HR: 68 (11 Oct 2022 08:55) (60 - 72)  BP: 93/50 (11 Oct 2022 08:55) (93/50 - 116/64)  BP(mean): 66 (11 Oct 2022 08:55) (66 - 84)  RR: 16 (11 Oct 2022 08:55) (16 - 18)  SpO2: 97% (11 Oct 2022 08:55) (97% - 99%)    Parameters below as of 11 Oct 2022 08:55  Patient On (Oxygen Delivery Method): room air        Physical exam:  General: No acute distress, awake and alert  Eyes: Anicteric sclerae, moist conjunctivae, see below for CNs  Neck: trachea midline, FROM, supple, no thyromegaly or lymphadenopathy  Cardiovascular: Regular rate and rhythm, no murmurs, rubs, or gallops. No carotid bruits.   Pulmonary: Anterior breath sounds clear bilaterally, no crackles or wheezing. No use of accessory muscles  GI: Abdomen soft, non-distended, non-tender  Extremities: Radial and DP pulses +2, no edema    Neurologic:  -Mental status: Awake, alert, oriented to person, place, and time. Speech is fluent with intact naming, repetition, and comprehension, no dysarthria. Recent and remote memory intact. Follows commands. Attention/concentration intact. Fund of knowledge appropriate.  -Cranial nerves:   II: Visual fields are full to confrontation.  III, IV, VI: Extraocular movements are intact without nystagmus. Pupils equally round and reactive to light  V:  Facial sensation V1-V3 equal and intact   VII: Face is symmetric with normal eye closure and smile  VIII: Hearing is bilaterally intact to finger rub  IX, X: Uvula is midline and soft palate rises symmetrically  XI: Head turning and shoulder shrug are intact.  XII: Tongue protrudes midline  Motor: Normal bulk and tone. No pronator drift. Strength bilateral upper extremity 5/5, bilateral lower extremities 5/5.  Rapid alternating movements intact and symmetric  Sensation: Intact to light touch bilaterally. No neglect or extinction on double simultaneous testing.  Coordination: No dysmetria on finger-to-nose and heel-to-shin bilaterally  Reflexes: Downgoing toes bilaterally   Gait: Narrow gait and steady    LABS:                        11.8   10.95 )-----------( 371      ( 11 Oct 2022 05:30 )             35.6     10-11    137  |  104  |  13  ----------------------------<  111<H>  4.1   |  23  |  0.57    Ca    9.2      11 Oct 2022 05:30  Phos  4.6     10-11  Mg     2.5     10-11            RADIOLOGY & ADDITIONAL TESTS:

## 2022-10-11 NOTE — DISCHARGE NOTE PROVIDER - NSDCCPTREATMENT_GEN_ALL_CORE_FT
PRINCIPAL PROCEDURE  Procedure: CT head wo con  Findings and Treatment: The CT examination demonstrates the ventricles, cisternal   spaces, and cortical sulci to be within normal limits. There is no   midline shift or extra axial collections. The gray white differentiation   appears within normal limits. There are linear areas of hyperdensity   within a few posterior right temporal and occipital sulci compatible with   subarachnoid hemorrhage (series 2 images 14 and 16 and series 5 images   44-45).  The bony windows demonstrates no fractures. The visualized paranasal   sinuses are within normal limits. The mastoid air cells are well aerated.  IMPRESSION: Small volume of subarachnoid hemorrhage within posterior right temporal and occipital sulci.      SECONDARY PROCEDURE  Procedure: MR head w con  Findings and Treatment: Impression: Nonspecific few punctate foci of T2 and Flair signal   hyperintensities within the white matter; findings may be seen in patient   with migraine headaches, vasculitis, microvascular disease, demyelinating   disease, Lyme disease and viral illness.  No evidence of acute infarction.  Small subcentimeter left frontal meningioma.

## 2022-10-11 NOTE — DISCHARGE NOTE PROVIDER - HOSPITAL COURSE
Hospital course:  42F with no PMH presenting with headache x 1 week. Pt also complaining of intermittent left eye pain with blurry vision x 3 months. Symptoms likely 2/2 RCVS (Reversible Cerebral Vasoconstriction Syndrome)     Patient had the following workup done in house:  - CTH (stable): small volume SAH in right temporal-occipital sulci  - CTA H/N: focal areas of narrowing involving R A2 and L A3 segments suspicious for vasculitis and L pcomm infundibulum.  - Repeat CTH (10/5): stable  - DSA (10/6): no cerebral aneurysm, AVM or dural fistula appreciated. Some areas of vessel irregularity/narrowing in the ESTHER and MCA branches, both proximal and distal, as well as some narrowing of the vertebrobasilar junction, right > left. This could represent vasculitis.  - MR Head: 1. Nonspecific few punctate foci of T2 and Flair signal hyperintensities within the white matter; findings may be seen in patient with migraine headaches, vasculitis, microvascular disease, demyelinating disease, Lyme disease and viral illness. 2. No evidence of acute infarction. 3. Small subcentimeter left frontal meningioma.  - Echo: EF 65-70, normal systolic function, normal LA  - Labs: - Autoimmune panel: positive BILL, dsDNA <12, anti Rn and Sm <0.2; anti SSA and SSB <0.2,   - CSF LDH 16, Gluc 67, Prot 27, rest wnl     Physical exam at discharge:  New medications on discharge:  Labs to be followed up:  Imaging to be done as outpatient:  Further outpatient workup:   Hospital course:  42F with no PMH presenting with headache x 1 week. Pt also complaining of intermittent left eye pain with blurry vision x 3 months. Symptoms likely 2/2 RCVS (Reversible Cerebral Vasoconstriction Syndrome)     Patient had the following workup done in house:  - CTH (stable): small volume SAH in right temporal-occipital sulci  - CTA H/N: focal areas of narrowing involving R A2 and L A3 segments suspicious for vasculitis and L pcomm infundibulum.  - Repeat CTH (10/5): stable  - DSA (10/6): no cerebral aneurysm, AVM or dural fistula appreciated. Some areas of vessel irregularity/narrowing in the ESTHER and MCA branches, both proximal and distal, as well as some narrowing of the vertebrobasilar junction, right > left. This could represent vasculitis.  - MR Head: 1. Nonspecific few punctate foci of T2 and Flair signal hyperintensities within the white matter; findings may be seen in patient with migraine headaches, vasculitis, microvascular disease, demyelinating disease, Lyme disease and viral illness. 2. No evidence of acute infarction. 3. Small subcentimeter left frontal meningioma.  - Echo: EF 65-70, normal systolic function, normal LA  - Labs: - Autoimmune panel: positive BILL, dsDNA <12, anti Rn and Sm <0.2; anti SSA and SSB <0.2,   - CSF LDH 16, Gluc 67, Prot 27, rest wnl     Physical exam at discharge:  Neurologic:  -Mental status: Awake, alert, oriented to person, place, and time. Speech is fluent with intact naming, repetition, and comprehension, no dysarthria. Recent and remote memory intact. Follows commands. Attention/concentration intact. Fund of knowledge appropriate.  -Cranial nerves:   II: Visual fields are full to confrontation.  III, IV, VI: Extraocular movements are intact without nystagmus. Pupils equally round and reactive to light  V:  Facial sensation V1-V3 equal and intact   VII: Face is symmetric with normal eye closure and smile  VIII: Hearing is bilaterally intact to finger rub  IX, X: Uvula is midline and soft palate rises symmetrically  XI: Head turning and shoulder shrug are intact.  XII: Tongue protrudes midline  Motor: Normal bulk and tone. No pronator drift. Strength bilateral upper extremity 5/5, bilateral lower extremities 5/5.  Rapid alternating movements intact and symmetric  Sensation: Intact to light touch bilaterally. No neglect or extinction on double simultaneous testing.  Coordination: No dysmetria on finger-to-nose and heel-to-shin bilaterally  Reflexes: Downgoing toes bilaterally   Gait: Narrow gait and steady  NIHSS 0    New medications on discharge:  Labs to be followed up:  Imaging to be done as outpatient:  Further outpatient workup:   Hospital course:  42F with no PMH presenting with headache x 1 week. Pt also complaining of intermittent left eye pain with blurry vision x 3 months. Symptoms likely 2/2 RCVS (Reversible Cerebral Vasoconstriction Syndrome)     Patient had the following workup done in house:  - CTH (stable): small volume SAH in right temporal-occipital sulci  - CTA H/N: focal areas of narrowing involving R A2 and L A3 segments suspicious for vasculitis and L pcomm infundibulum.  - Repeat CTH (10/5): stable  - DSA (10/6): no cerebral aneurysm, AVM or dural fistula appreciated. Some areas of vessel irregularity/narrowing in the ESTHER and MCA branches, both proximal and distal, as well as some narrowing of the vertebrobasilar junction, right > left. This could represent vasculitis.  - MR Head: 1. Nonspecific few punctate foci of T2 and Flair signal hyperintensities within the white matter; findings may be seen in patient with migraine headaches, vasculitis, microvascular disease, demyelinating disease, Lyme disease and viral illness. 2. No evidence of acute infarction. 3. Small subcentimeter left frontal meningioma.  - Echo: EF 65-70, normal systolic function, normal LA  - Labs: - Autoimmune panel: positive BILL, dsDNA <12, anti Rn and Sm <0.2; anti SSA and SSB <0.2,   - CSF LDH 16, Gluc 67, Prot 27, rest wnl     Physical exam at discharge:  Neurologic:  -Mental status: Awake, alert, oriented to person, place, and time. Speech is fluent with intact naming, repetition, and comprehension, no dysarthria. Recent and remote memory intact. Follows commands. Attention/concentration intact. Fund of knowledge appropriate.  -Cranial nerves:   II: Visual fields are full to confrontation.  III, IV, VI: Extraocular movements are intact without nystagmus. Pupils equally round and reactive to light  V:  Facial sensation V1-V3 equal and intact   VII: Face is symmetric with normal eye closure and smile  VIII: Hearing is bilaterally intact to finger rub  IX, X: Uvula is midline and soft palate rises symmetrically  XI: Head turning and shoulder shrug are intact.  XII: Tongue protrudes midline  Motor: Normal bulk and tone. No pronator drift. Strength bilateral upper extremity 5/5, bilateral lower extremities 5/5.  Rapid alternating movements intact and symmetric  Sensation: Intact to light touch bilaterally. No neglect or extinction on double simultaneous testing.  Coordination: No dysmetria on finger-to-nose and heel-to-shin bilaterally  Reflexes: Downgoing toes bilaterally   Gait: Narrow gait and steady  NIHSS 0    New medications on discharge:   Labs to be followed up:  Imaging to be done as outpatient:  Further outpatient workup:   Hospital course:  42F with no PMH presenting with headache x 1 week. Pt also complaining of intermittent left eye pain with blurry vision x 3 months. Symptoms likely 2/2 RCVS (Reversible Cerebral Vasoconstriction Syndrome)     #Reversible cerebrovascular vasoconstriction syndrome   Pt presented with HA, intermittent L blurry vision. NIHSS 0.  Patient had the following workup done in house:  - CTH (stable): small volume SAH in right temporal-occipital sulci  - CTA H/N: focal areas of narrowing involving R A2 and L A3 segments suspicious for vasculitis and L pcomm infundibulum.  - Repeat CTH (10/5): stable  - DSA (10/6): no cerebral aneurysm, AVM or dural fistula appreciated. Some areas of vessel irregularity/narrowing in the ESTHER and MCA branches, both proximal and distal, as well as some narrowing of the vertebrobasilar junction, right > left. This could represent vasculitis.  - MR Head: 1. Nonspecific few punctate foci of T2 and Flair signal hyperintensities within the white matter; findings may be seen in patient with migraine headaches, vasculitis, microvascular disease, demyelinating disease, Lyme disease and viral illness. 2. No evidence of acute infarction. 3. Small subcentimeter left frontal meningioma.  - Echo: EF 65-70, normal systolic function, normal LA  - Labs: - Autoimmune panel: positive BILL, dsDNA <12, anti Rn and Sm <0.2; anti SSA and SSB <0.2,   - CSF LDH 16, Gluc 67, Prot 27, rest wnl   Pt likely reversible vasoconstriction syndrome, reported mild headaches and sensitivity to light. Started on verapamil 40 BID, neurontin 100 TID and valproate sodium 500mg IVPB. Able to tolerate PO diet upon discharge  - c/w verapamil 40 BID, depakote 500BID, gabapentin 100 TID (given 7 day supply through [facility])  - discharged with [facility] for help with meds and ADLs  - f/u with Dr Green (clinic will call patient)    Physical exam at discharge:  Neurologic:  -Mental status: Awake, alert, oriented to person, place, and time. Speech is fluent with intact naming, repetition, and comprehension, no dysarthria. Recent and remote memory intact. Follows commands. Attention/concentration intact. Fund of knowledge appropriate.  -Cranial nerves:   II: Visual fields are full to confrontation.  III, IV, VI: Extraocular movements are intact without nystagmus. Pupils equally round and reactive to light  V:  Facial sensation V1-V3 equal and intact   VII: Face is symmetric with normal eye closure and smile  VIII: Hearing is bilaterally intact to finger rub  IX, X: Uvula is midline and soft palate rises symmetrically  XI: Head turning and shoulder shrug are intact.  XII: Tongue protrudes midline  Motor: Normal bulk and tone. No pronator drift. Strength bilateral upper extremity 5/5, bilateral lower extremities 5/5.  Rapid alternating movements intact and symmetric  Sensation: Intact to light touch bilaterally. No neglect or extinction on double simultaneous testing.  Coordination: No dysmetria on finger-to-nose and heel-to-shin bilaterally  Reflexes: Downgoing toes bilaterally   Gait: Narrow gait and steady  NIHSS 0    New medications on discharge: verapamil 40 BID, gabapentin 100 TID, depakote 500 daily  Labs to be followed up: none  Imaging to be done as outpatient: [facility]  Further outpatient workup:   Hospital course:  42F with no PMH presenting with headache x 1 week. Pt also complaining of intermittent left eye pain with blurry vision x 3 months. Symptoms likely 2/2 RCVS (Reversible Cerebral Vasoconstriction Syndrome)     #Reversible cerebrovascular vasoconstriction syndrome   Pt presented with HA, intermittent L blurry vision. NIHSS 0.  Patient had the following workup done in house:  - CTH (stable): small volume SAH in right temporal-occipital sulci  - CTA H/N: focal areas of narrowing involving R A2 and L A3 segments suspicious for vasculitis and L pcomm infundibulum.  - Repeat CTH (10/5): stable  - DSA (10/6): no cerebral aneurysm, AVM or dural fistula appreciated. Some areas of vessel irregularity/narrowing in the ESTHER and MCA branches, both proximal and distal, as well as some narrowing of the vertebrobasilar junction, right > left. This could represent vasculitis.  - MR Head: 1. Nonspecific few punctate foci of T2 and Flair signal hyperintensities within the white matter; findings may be seen in patient with migraine headaches, vasculitis, microvascular disease, demyelinating disease, Lyme disease and viral illness. 2. No evidence of acute infarction. 3. Small subcentimeter left frontal meningioma.  - Echo: EF 65-70, normal systolic function, normal LA  - Labs: - Autoimmune panel: positive BILL, dsDNA <12, anti Rn and Sm <0.2; anti SSA and SSB <0.2,   - CSF LDH 16, Gluc 67, Prot 27, rest wnl   Pt likely reversible vasoconstriction syndrome, reported mild headaches and sensitivity to light. Started on verapamil 40 BID, neurontin 100 TID and valproate sodium 500mg IVPB. Able to tolerate PO diet upon discharge  - c/w verapamil 40 BID, depakote 500BID, gabapentin 100 TID (given 7 day supply through [facility])  - discharged with [facility] for help with meds and ADLs  - f/u with Dr Green (clinic will call patient)    Physical exam at discharge:  Neurologic:  -Mental status: Awake, alert, oriented to person, place, and time. Speech is fluent with intact naming, repetition, and comprehension, no dysarthria. Recent and remote memory intact. Follows commands. Attention/concentration intact. Fund of knowledge appropriate.  -Cranial nerves:   II: Visual fields are full to confrontation.  III, IV, VI: Extraocular movements are intact without nystagmus. Pupils equally round and reactive to light  V:  Facial sensation V1-V3 equal and intact   VII: Face is symmetric with normal eye closure and smile  VIII: Hearing is bilaterally intact to finger rub  IX, X: Uvula is midline and soft palate rises symmetrically  XI: Head turning and shoulder shrug are intact.  XII: Tongue protrudes midline  Motor: Normal bulk and tone. No pronator drift. Strength bilateral upper extremity 5/5, bilateral lower extremities 5/5.  Rapid alternating movements intact and symmetric  Sensation: Intact to light touch bilaterally. No neglect or extinction on double simultaneous testing.  Coordination: No dysmetria on finger-to-nose and heel-to-shin bilaterally  Reflexes: Downgoing toes bilaterally   Gait: Narrow gait and steady  NIHSS 0    New medications on discharge: verapamil 40 BID, gabapentin 100 TID, depakote 500 daily  Labs to be followed up: none  Imaging to be done as outpatient: CTH, CTA  Further outpatient workup:

## 2022-10-11 NOTE — PROGRESS NOTE ADULT - ASSESSMENT
42F with no PMH presented with headache x 1 week and intermittent left eye pain with blurry vision x 3 months. CTH showed small volume of SAH in the right temporal/occipital sulci. CTA showed focal areas of narrowing involving R A2 and L A3 segments suspicious for vasculitis and L pcomm infundibulum. Differential includes vasculitis vs RCVS.    Neuro - most Likely RCVS  - Holding all AC and antithrombotics for now  - CTH Results: Small volume of subarachnoid hemorrhage within posterior right temporal and occipital sulci.  - CTA Results: Focal areas of narrowing involving the right A2 and left A3 segments which may represent vasculitis. Left posterior communicating artery infundibulum.  - cerebral angiogram performed 10/6 - Some areas of vessel irregularity/narrowing in the ESTHER and MCA branches, both proximal and distal, as well as some narrowing of the vertebrobasilar junction, right > left. This could represent vasculitis.  - MR Brain: 1. Nonspecific few punctate foci of T2 and Flair signal hyperintensities within the white matter; findings may be seen in patient with migraine headaches, vasculitis, microvascular disease, demyelinating disease, Lyme disease and viral illness. 2. No evidence of acute infarction. 3. Small subcentimeter left frontal meningioma  - Keppra dc'ed per NSGY  - Headache: c/w Verapamil 120mg qd, c/w Valproate  - Q4hr stroke neuro checks and vitals  - LP performed (10/7): normal glucose and protein. Remainder pending, f/u  - Autoimmune panel: positive BILL, dsDNA <12, anti Rn and Sm <0.2; anti SSA and SSB <0.2,   - CSF LDH 16, Gluc 67, Prot 27  - started gabapentin 100mg TID for headaches   - F/u Rheumatology recs  - orthostatics with PT negative     Cardiovascular  - SBP <160  - TTE with aortic sclerosis, EF 70%  - LDL results: 83    Pulm  - Call provider if SPO2 < 94%    Renal  - Daily BMP    Endocrine  - A1C results: 5.5%  - TSH results: 1.25    Misc  - Diet: Regular  - Activity: IAT  - DVT Prophylaxis: Lovenox, SCDs  - GI Prophylaxis: none  - Code Status: full code  - Disposition: F and then home after medical optimization

## 2022-10-11 NOTE — DISCHARGE NOTE PROVIDER - CARE PROVIDER_API CALL
Xiomara Green)  Neurology  [address] [address]  Phone: [phone]  Fax: [phone]  Follow Up Time: 1 month

## 2022-10-11 NOTE — PROGRESS NOTE ADULT - SUBJECTIVE AND OBJECTIVE BOX
Patient is a 42y old  Female who presents with a chief complaint of HEADACHE SUBARACHNOIDHEMORRHAGE     (07 Oct 2022 12:36)      INTERVAL HPI/OVERNIGHT EVENTS:    Pt. seen and examined earlier today  Pt. c/o intermittent HA    Review of Systems: 12 point review of systems otherwise negative    MEDICATIONS  (STANDING):  bisacodyl 5 milliGRAM(s) Oral at bedtime  enoxaparin Injectable 40 milliGRAM(s) SubCutaneous every 24 hours  gabapentin 100 milliGRAM(s) Oral three times a day  insulin lispro (ADMELOG) corrective regimen sliding scale   SubCutaneous Before meals and at bedtime  melatonin 5 milliGRAM(s) Oral at bedtime  polyethylene glycol 3350 17 Gram(s) Oral daily  senna 2 Tablet(s) Oral at bedtime  valproate sodium  IVPB 500 milliGRAM(s) IV Intermittent <User Schedule>  verapamil 40 milliGRAM(s) Oral every 12 hours    MEDICATIONS  (PRN):  acetaminophen     Tablet .. 650 milliGRAM(s) Oral every 6 hours PRN Mild Pain (1 - 3)  ondansetron Injectable 4 milliGRAM(s) IV Push every 6 hours PRN Nausea and/or Vomiting      Allergies    No Known Allergies    Intolerances          Vital Signs Last 24 Hrs  T(C): 36.8 (11 Oct 2022 10:00), Max: 37.1 (10 Oct 2022 13:22)  T(F): 98.3 (11 Oct 2022 10:00), Max: 98.7 (10 Oct 2022 13:22)  HR: 68 (11 Oct 2022 08:55) (60 - 72)  BP: 93/50 (11 Oct 2022 08:55) (93/50 - 116/64)  BP(mean): 66 (11 Oct 2022 08:55) (66 - 84)  RR: 16 (11 Oct 2022 08:55) (16 - 18)  SpO2: 97% (11 Oct 2022 08:55) (97% - 99%)    Parameters below as of 11 Oct 2022 08:55  Patient On (Oxygen Delivery Method): room air      CAPILLARY BLOOD GLUCOSE      POCT Blood Glucose.: 95 mg/dL (11 Oct 2022 11:03)  POCT Blood Glucose.: 99 mg/dL (11 Oct 2022 06:13)  POCT Blood Glucose.: 128 mg/dL (10 Oct 2022 21:28)  POCT Blood Glucose.: 98 mg/dL (10 Oct 2022 16:43)  POCT Blood Glucose.: 116 mg/dL (10 Oct 2022 12:54)  POCT Blood Glucose.: 220 mg/dL (10 Oct 2022 11:43)      10-10 @ 07:01  -  10-11 @ 07:00  --------------------------------------------------------  IN: 0 mL / OUT: 200 mL / NET: -200 mL        Physical Exam:  (earlier today)  Daily     Daily   General:  comfortable-appearing in NAD  HEENT:  MMM  CV:  RRR  Abdomen:  soft NT ND  Extremities:  no edema B/L LE  Skin:  WWP  Neuro:  AAOx3, no dysarthria    LABS:                        11.8   10.95 )-----------( 371      ( 11 Oct 2022 05:30 )             35.6     10-11    137  |  104  |  13  ----------------------------<  111<H>  4.1   |  23  |  0.57    Ca    9.2      11 Oct 2022 05:30  Phos  4.6     10-11  Mg     2.5     10-11

## 2022-10-11 NOTE — DISCHARGE NOTE PROVIDER - NSDCMRMEDTOKEN_GEN_ALL_CORE_FT
gabapentin 100 mg oral capsule: 1 cap(s) orally 3 times a day  valproic acid (as valproate sodium) 100 mg/mL intravenous solution: 5 milliliter(s) intravenous   verapamil 40 mg oral tablet: 1 tab(s) orally every 12 hours   gabapentin 100 mg oral capsule: 1 cap(s) orally 3 times a day  verapamil 40 mg oral tablet: 1 tab(s) orally every 12 hours   Depakote 500 mg oral delayed release tablet: 1 tab(s) orally once a day   Neurontin 100 mg oral capsule: 1 cap(s) orally 3 times a day  verapamil 40 mg oral tablet: 1 tab(s) orally every 12 hours

## 2022-10-11 NOTE — CHART NOTE - NSCHARTNOTEFT_GEN_A_CORE
Admitting Diagnosis:   Patient is a 42y old  Female who presents with a chief complaint of Headache (11 Oct 2022 16:00)      PAST MEDICAL & SURGICAL HISTORY:  Subarachnoid hemorrhage, nontraumatic          Current Nutrition Order: Regular        PO Intake: Good (%) [   ]  Fair (50-75%) [   ] Poor (<25%) [ x ]    GI Issues: Abdomen ND/NT, +BS x4, LBM 10/10    Pain: 1/10, head    Skin Integrity: WDI, no edema noted    Labs:   10-11    137  |  104  |  13  ----------------------------<  111<H>  4.1   |  23  |  0.57    Ca    9.2      11 Oct 2022 05:30  Phos  4.6     10-11  Mg     2.5     10-11      CAPILLARY BLOOD GLUCOSE      POCT Blood Glucose.: 95 mg/dL (11 Oct 2022 11:03)  POCT Blood Glucose.: 99 mg/dL (11 Oct 2022 06:13)  POCT Blood Glucose.: 128 mg/dL (10 Oct 2022 21:28)  POCT Blood Glucose.: 98 mg/dL (10 Oct 2022 16:43)      Medications:  MEDICATIONS  (STANDING):  bisacodyl 5 milliGRAM(s) Oral at bedtime  enoxaparin Injectable 40 milliGRAM(s) SubCutaneous every 24 hours  gabapentin 100 milliGRAM(s) Oral three times a day  insulin lispro (ADMELOG) corrective regimen sliding scale   SubCutaneous Before meals and at bedtime  melatonin 5 milliGRAM(s) Oral at bedtime  polyethylene glycol 3350 17 Gram(s) Oral daily  senna 2 Tablet(s) Oral at bedtime  valproate sodium  IVPB 500 milliGRAM(s) IV Intermittent <User Schedule>  verapamil 40 milliGRAM(s) Oral every 12 hours    MEDICATIONS  (PRN):  acetaminophen     Tablet .. 650 milliGRAM(s) Oral every 6 hours PRN Mild Pain (1 - 3)  ondansetron Injectable 4 milliGRAM(s) IV Push every 6 hours PRN Nausea and/or Vomiting      Height for BMI (FEET)	5 Feet  Height for BMI (INCHES)	1 Inch(s)  Height for BMI (CENTIMETERS)	154.94 Centimeter(s)  Weight for BMI (lbs)	132 lb  Weight for BMI (kg)	59.9 kg  Body Mass Index	24.9    Weight Change: No new wt since admit.     Estimated energy needs:   Weight used for calculations	current weight  Estimated Energy Needs Weight (lbs)	105 lb  Estimated Energy Needs Weight (kg)	47.6 kg  Estimated Energy Needs From (adonay/kg)	25  Estimated Energy Needs To (adonay/kg)	30  Estimated Energy Needs Calculated From (adonay/kg)	1190  Estimated Energy Needs Calculated To (adonay/kg)	1428  Weight used for calculations	current weight  Estimated Protein Needs Weight (lbs)	105 lb  Estimated Protein Needs Weight (kg)	47.6 kg  Estimated Protein Needs From (g/kg)	1  Estimated Protein Needs To (g/kg)	1.2  Estimated Protein Needs Calculated From (g/kg)	47.6  Estimated Protein Needs Calculated To (g/kg)	57.12  Estimated Fluid Needs Weight (lbs)	105 lb  Estimated Fluid Needs Weight (kg)	47.6 kg  Estimated Fluid Needs From (ml/kg)	25  Estimated Fluid Needs To (ml/kg)	30  Estimated Fluid Needs Calculated From (ml/kg)	1190  Estimated Fluid Needs Calculated To (ml/kg)	1428  Other Calculations	%IBW: 126; IBW used to calculate estimated needs d/t pt being >120% IBW. Adjusted for clinical status/clinical judgment.    Subjective: 43 yo female no PMHx presenting with headache x 1 week. CTH reveals small volume of SAH in the right temporal/occipital sulci. CTA reveals focal areas of narrowing involving R A2 and L A3 segments and L pcomm infundibulum. Admitted to NSICU for close monitoring. Cerebral angiogram demonstrates areas of irregularity and narrowing in ESTHER and MCA branches proximally and distally likely representing vasculitis.     Pt assessed at bedside; family at bedside provided translation. Rx and labs reviewed. Pt reports a poor appetite and reflective PO intake. Pt states she ate some fruit for breakfast today, but not much else. Pt does note her appetite has been improving with symptom relief from HA; agreeable to ONS regimen. No new c/o NVCD or difficult chew/swallow. RDN will continue to reassess, intervene, and monitor as appropriate.     Previous Nutrition Diagnosis: Inadequate Energy Intake RT nausea/vomiting for 1 week 2/2 headache AEB pt's diet recall for 1 week.    Active [ x ]  Resolved [   ]    If resolved, new PES:     Goal: Pt to consistently meet >75% of EER during hospital stay    Recommendations:  -Continue current diet  -Add Ensure TID   -Encourage good PO intake and ONS regimen   -Honor food preferences as able  -Monitor chemistry, GI fxn, and skin integrity     Risk Level: High [   ] Moderate [ x ] Low [   ]

## 2022-10-11 NOTE — PROGRESS NOTE ADULT - SUBJECTIVE AND OBJECTIVE BOX
Neurology Stroke Progress Note    INTERVAL HPI/OVERNIGHT EVENTS:  Patient seen and examined at bedside. She was resting comfortably and did not appear in acute distress. She stated that she had 8/10 sudden pain all over her head last night. She also c/o dizziness and nausea last night after eating food which resolved after getting the anti emetic.     MEDICATIONS  (STANDING):  bisacodyl 5 milliGRAM(s) Oral at bedtime  enoxaparin Injectable 40 milliGRAM(s) SubCutaneous every 24 hours  gabapentin 100 milliGRAM(s) Oral three times a day  insulin lispro (ADMELOG) corrective regimen sliding scale   SubCutaneous Before meals and at bedtime  melatonin 5 milliGRAM(s) Oral at bedtime  polyethylene glycol 3350 17 Gram(s) Oral daily  senna 2 Tablet(s) Oral at bedtime  valproate sodium  IVPB 500 milliGRAM(s) IV Intermittent <User Schedule>  verapamil 40 milliGRAM(s) Oral every 12 hours    MEDICATIONS  (PRN):  acetaminophen     Tablet .. 650 milliGRAM(s) Oral every 6 hours PRN Mild Pain (1 - 3)  ondansetron Injectable 4 milliGRAM(s) IV Push every 6 hours PRN Nausea and/or Vomiting    Allergies    No Known Allergies    Intolerances      Vital Signs Last 24 Hrs  T(C): 36.8 (11 Oct 2022 14:00), Max: 37 (11 Oct 2022 05:59)  T(F): 98.3 (11 Oct 2022 14:00), Max: 98.6 (11 Oct 2022 05:59)  HR: 64 (11 Oct 2022 11:56) (60 - 72)  BP: 102/58 (11 Oct 2022 11:56) (93/50 - 116/64)  BP(mean): 75 (11 Oct 2022 11:56) (66 - 84)  RR: 16 (11 Oct 2022 11:55) (16 - 18)  SpO2: 98% (11 Oct 2022 11:56) (97% - 99%)    Parameters below as of 11 Oct 2022 11:55  Patient On (Oxygen Delivery Method): room air        Physical exam:  General: No acute distress, awake and alert  Eyes: Anicteric sclerae, moist conjunctivae, see below for CNs  Neck: trachea midline, FROM, supple, no thyromegaly or lymphadenopathy  Cardiovascular: Regular rate and rhythm, no murmurs, rubs, or gallops. No carotid bruits.   Pulmonary: Anterior breath sounds clear bilaterally, no crackles or wheezing. No use of accessory muscles  GI: Abdomen soft, non-distended, non-tender  Extremities: Radial and DP pulses +2, no edema    Neurologic:  -Mental status: Awake, alert, oriented to person, place, and time. Speech is fluent with intact naming, repetition, and comprehension, no dysarthria. Recent and remote memory intact. Follows commands. Attention/concentration intact. Fund of knowledge appropriate.  -Cranial nerves:   II: Visual fields are full to confrontation.  III, IV, VI: Extraocular movements are intact without nystagmus. Pupils equally round and reactive to light  V:  Facial sensation V1-V3 equal and intact   VII: Face is symmetric with normal eye closure and smile  VIII: Hearing is bilaterally intact to finger rub  IX, X: Uvula is midline and soft palate rises symmetrically  XI: Head turning and shoulder shrug are intact.  XII: Tongue protrudes midline  Motor: Normal bulk and tone. No pronator drift. Strength bilateral upper extremity 5/5, bilateral lower extremities 5/5.  Rapid alternating movements intact and symmetric  Sensation: Intact to light touch bilaterally. No neglect or extinction on double simultaneous testing.  Coordination: No dysmetria on finger-to-nose and heel-to-shin bilaterally      LABS:                        11.8   10.95 )-----------( 371      ( 11 Oct 2022 05:30 )             35.6     10-11    137  |  104  |  13  ----------------------------<  111<H>  4.1   |  23  |  0.57    Ca    9.2      11 Oct 2022 05:30  Phos  4.6     10-11  Mg     2.5     10-11            RADIOLOGY & ADDITIONAL TESTS:     Neurology Stroke Progress Note    INTERVAL HPI/OVERNIGHT EVENTS:  Patient seen and examined at bedside. [place]  ID: [id number]. She was resting comfortably and did not appear in acute distress. Her last headache was at 2-3am last night, rated 6-8 out of 10, with slow onset started in the middle of her head and spread to all over the front and back of her head. She also c/o dizziness and nausea after every time she eats food which result in two episodes of emesis. She states she  MEDICATIONS  (STANDING):  bisacodyl 5 milliGRAM(s) Oral at bedtime  enoxaparin Injectable 40 milliGRAM(s) SubCutaneous every 24 hours  gabapentin 100 milliGRAM(s) Oral three times a day  insulin lispro (ADMELOG) corrective regimen sliding scale   SubCutaneous Before meals and at bedtime  melatonin 5 milliGRAM(s) Oral at bedtime  polyethylene glycol 3350 17 Gram(s) Oral daily  senna 2 Tablet(s) Oral at bedtime  valproate sodium  IVPB 500 milliGRAM(s) IV Intermittent <User Schedule>  verapamil 40 milliGRAM(s) Oral every 12 hours    MEDICATIONS  (PRN):  acetaminophen     Tablet .. 650 milliGRAM(s) Oral every 6 hours PRN Mild Pain (1 - 3)  ondansetron Injectable 4 milliGRAM(s) IV Push every 6 hours PRN Nausea and/or Vomiting    Allergies    No Known Allergies    Intolerances      Vital Signs Last 24 Hrs  T(C): 36.8 (11 Oct 2022 14:00), Max: 37 (11 Oct 2022 05:59)  T(F): 98.3 (11 Oct 2022 14:00), Max: 98.6 (11 Oct 2022 05:59)  HR: 64 (11 Oct 2022 11:56) (60 - 72)  BP: 102/58 (11 Oct 2022 11:56) (93/50 - 116/64)  BP(mean): 75 (11 Oct 2022 11:56) (66 - 84)  RR: 16 (11 Oct 2022 11:55) (16 - 18)  SpO2: 98% (11 Oct 2022 11:56) (97% - 99%)    Parameters below as of 11 Oct 2022 11:55  Patient On (Oxygen Delivery Method): room air        Physical exam:  General: No acute distress, awake and alert  Eyes: Anicteric sclerae, moist conjunctivae, see below for CNs  Neck: trachea midline, FROM, supple, no thyromegaly or lymphadenopathy  Cardiovascular: Regular rate and rhythm, no murmurs, rubs, or gallops. No carotid bruits.   Pulmonary: Anterior breath sounds clear bilaterally, no crackles or wheezing. No use of accessory muscles  GI: Abdomen soft, non-distended, non-tender  Extremities: Radial and DP pulses +2, no edema    Neurologic:  -Mental status: Awake, alert, oriented to person, place, and time. Speech is fluent with intact naming, repetition, and comprehension, no dysarthria. Recent and remote memory intact. Follows commands. Attention/concentration intact. Fund of knowledge appropriate.  -Cranial nerves:   II: Visual fields are full to confrontation.  III, IV, VI: Extraocular movements are intact without nystagmus. Pupils equally round and reactive to light  V:  Facial sensation V1-V3 equal and intact   VII: Face is symmetric with normal eye closure and smile  VIII: Hearing is bilaterally intact to finger rub  IX, X: Uvula is midline and soft palate rises symmetrically  XI: Head turning and shoulder shrug are intact.  XII: Tongue protrudes midline  Motor: Normal bulk and tone. No pronator drift. Strength bilateral upper extremity 5/5, bilateral lower extremities 5/5.  Rapid alternating movements intact and symmetric  Sensation: Intact to light touch bilaterally. No neglect or extinction on double simultaneous testing.  Coordination: No dysmetria on finger-to-nose and heel-to-shin bilaterally      LABS:                        11.8   10.95 )-----------( 371      ( 11 Oct 2022 05:30 )             35.6     10-11    137  |  104  |  13  ----------------------------<  111<H>  4.1   |  23  |  0.57    Ca    9.2      11 Oct 2022 05:30  Phos  4.6     10-11  Mg     2.5     10-11            RADIOLOGY & ADDITIONAL TESTS:     **STEPDOWN TO [facility]**  42F with no PMH presenting with headache x 1 week. Pt also complaining of intermittent left eye pain with blurry vision x 3 months. 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 The patient is started on verapamil 40mg and gabapentin 100mg for headache control with prn raglan for occasional nausea. Given the patient does not have an insurance, she will be stepped down to [facility] for medical stabilization and optimization before being discharged home.    INTERVAL HPI/OVERNIGHT EVENTS:  Patient seen and examined at bedside. [place]  ID: [id number]. She was resting comfortably and did not appear in acute distress. Her last headache was at 2-3am last night, rated 6-8 out of 10, with slow onset started in the middle of her head and spread to all over the front and back of her head. She also c/o dizziness and nausea after every time she eats food which result in two episodes of emesis.     MEDICATIONS  (STANDING):  bisacodyl 5 milliGRAM(s) Oral at bedtime  enoxaparin Injectable 40 milliGRAM(s) SubCutaneous every 24 hours  gabapentin 100 milliGRAM(s) Oral three times a day  insulin lispro (ADMELOG) corrective regimen sliding scale   SubCutaneous Before meals and at bedtime  melatonin 5 milliGRAM(s) Oral at bedtime  polyethylene glycol 3350 17 Gram(s) Oral daily  senna 2 Tablet(s) Oral at bedtime  valproate sodium  IVPB 500 milliGRAM(s) IV Intermittent <User Schedule>  verapamil 40 milliGRAM(s) Oral every 12 hours    MEDICATIONS  (PRN):  acetaminophen     Tablet .. 650 milliGRAM(s) Oral every 6 hours PRN Mild Pain (1 - 3)  ondansetron Injectable 4 milliGRAM(s) IV Push every 6 hours PRN Nausea and/or Vomiting    Allergies    No Known Allergies    Intolerances      Vital Signs Last 24 Hrs  T(C): 36.8 (11 Oct 2022 14:00), Max: 37 (11 Oct 2022 05:59)  T(F): 98.3 (11 Oct 2022 14:00), Max: 98.6 (11 Oct 2022 05:59)  HR: 64 (11 Oct 2022 11:56) (60 - 72)  BP: 102/58 (11 Oct 2022 11:56) (93/50 - 116/64)  BP(mean): 75 (11 Oct 2022 11:56) (66 - 84)  RR: 16 (11 Oct 2022 11:55) (16 - 18)  SpO2: 98% (11 Oct 2022 11:56) (97% - 99%)    Parameters below as of 11 Oct 2022 11:55  Patient On (Oxygen Delivery Method): room air        Physical exam:  General: No acute distress, awake and alert  Eyes: Anicteric sclerae, moist conjunctivae, see below for CNs  Neck: trachea midline, FROM, supple, no thyromegaly or lymphadenopathy  Cardiovascular: Regular rate and rhythm, no murmurs, rubs, or gallops. No carotid bruits.   Pulmonary: Anterior breath sounds clear bilaterally, no crackles or wheezing. No use of accessory muscles  GI: Abdomen soft, non-distended, non-tender  Extremities: Radial and DP pulses +2, no edema    Neurologic:  -Mental status: Awake, alert, oriented to person, place, and time. Speech is fluent with intact naming, repetition, and comprehension, no dysarthria. Recent and remote memory intact. Follows commands. Attention/concentration intact. Fund of knowledge appropriate.  -Cranial nerves:   II: Visual fields are full to confrontation.  III, IV, VI: Extraocular movements are intact without nystagmus. Pupils equally round and reactive to light  V:  Facial sensation V1-V3 equal and intact   VII: Face is symmetric with normal eye closure and smile  VIII: Hearing is bilaterally intact to finger rub  IX, X: Uvula is midline and soft palate rises symmetrically  XI: Head turning and shoulder shrug are intact.  XII: Tongue protrudes midline  Motor: Normal bulk and tone. No pronator drift. Strength bilateral upper extremity 5/5, bilateral lower extremities 5/5.  Rapid alternating movements intact and symmetric  Sensation: Intact to light touch bilaterally. No neglect or extinction on double simultaneous testing.  Coordination: No dysmetria on finger-to-nose and heel-to-shin bilaterally      LABS:                        11.8   10.95 )-----------( 371      ( 11 Oct 2022 05:30 )             35.6     10-11    137  |  104  |  13  ----------------------------<  111<H>  4.1   |  23  |  0.57    Ca    9.2      11 Oct 2022 05:30  Phos  4.6     10-11  Mg     2.5     10-11            RADIOLOGY & ADDITIONAL TESTS:

## 2022-10-11 NOTE — PROGRESS NOTE ADULT - NS ATTEND AMEND GEN_ALL_CORE FT
The patient is a 42-year-old female admitted after presenting with persistent headache x1 week associated with left eye pain and blurry vision.  CTA showed a right temporal occipital SAH with CTA/angiogram showing multifocal areas of narrowing concerning for vasculitis. MRI negative for acute ischemia but did confirm R temporal SAH. CSF was noninflammatory. BILL +, unlikely related per rheum. Ddx also includes RCVS. Continue verapamil, valproate. Will add gabapentin for headache control. Hold antithrombotics. Monitor for dizziness, headaches. The patient is a 42-year-old female admitted after presenting with persistent headache x1 week associated with left eye pain and blurry vision.  CTA showed a right temporal occipital SAH with CTA/angiogram showing multifocal areas of narrowing concerning for vasculitis. MRI negative for acute ischemia but did confirm R temporooccipital SAH. CSF was noninflammatory. BILL +, unlikely related per rheum. Ddx also includes RCVS- continue to monitor thunderclap-like headaches. Continue verapamil (reluctant to increase dose d/t hypotension), valproate. Will add gabapentin for headache control. Hold antithrombotics. Monitor for dizziness, headaches.

## 2022-10-11 NOTE — PROGRESS NOTE ADULT - ASSESSMENT
42F with no PMH presented with headache x 1 week and intermittent left eye pain with blurry vision x 3 months. CTH showed small volume of SAH in the right temporal/occipital sulci. CTA showed focal areas of narrowing involving R A2 and L A3 segments suspicious for vasculitis and L pcomm infundibulum. Differential includes vasculitis vs RCVS. Given the patient does not have an insurance, she will be stepped down to [facility] for medical stabilization and optimization before being discharged home.   42F with no PMH presented with headache x 1 week and intermittent left eye pain with blurry vision x 3 months. CTH showed small volume of SAH in the right temporal/occipital sulci. CTA showed focal areas of narrowing involving R A2 and L A3 segments suspicious for vasculitis and L pcomm infundibulum. Differential includes vasculitis vs RCVS. Given the patient does not have an insurance, she will be stepped down to [facility] for medical stabilization and optimization before being discharged home.    42F with no PMH presented with headache x 1 week and intermittent left eye pain with blurry vision x 3 months. CTH showed small volume of SAH in the right temporal/occipital sulci. CTA showed focal areas of narrowing involving R A2 and L A3 segments suspicious for vasculitis and L pcomm infundibulum. Differential includes vasculitis vs RCVS.    #RCVS  - CTH Results: Small volume of subarachnoid hemorrhage within posterior right temporal and occipital sulci.  - CTA Results: Focal areas of narrowing involving the right A2 and left A3 segments which may represent vasculitis. Left posterior communicating artery infundibulum.  - cerebral angiogram performed 10/6 - Some areas of vessel irregularity/narrowing in the ESTHER and MCA branches, both proximal and distal, as well as some narrowing of the vertebrobasilar junction, right > left. This could represent vasculitis.  - MR Brain: 1. Nonspecific few punctate foci of T2 and Flair signal hyperintensities within the white matter; findings may be seen in patient with migraine headaches, vasculitis, microvascular disease, demyelinating disease, Lyme disease and viral illness. 2. No evidence of acute infarction. 3. Small subcentimeter left frontal meningioma  - LP performed (10/7): normal glucose and protein. Remainder pending, f/u  - Autoimmune panel: positive BILL, dsDNA <12, anti Rn and Sm <0.2; anti SSA and SSB <0.2,   - CSF LDH 16, Gluc 67, Prot 27  - Keppra dc'ed per NSGY      - Holding all AC and antithrombotics for now  - Headache: c/w Verapamil 120mg qd, c/w Valproate  - Q4hr stroke neuro checks and vitals  - started gabapentin 100mg TID for headaches   - F/u Rheumatology recs  - orthostatics with PT negative       Prophylactic measures  - Diet: Regular  - DVT Prophylaxis: Lovenox, SCDs  - GI Prophylaxis: none  - Code Status: full code  - Disposition: [facility]

## 2022-10-11 NOTE — PROGRESS NOTE ADULT - SUBJECTIVE AND OBJECTIVE BOX
TRANSFER ACCEPTANCE NOTE: [facility] -> [facility] (Stroke service)     Hospital Course: 42F with no PMH presenting with headache x 1 week. Pt also complaining of intermittent left eye pain with blurry vision x 3 months. 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 The patient is started on verapamil 40mg and gabapentin 100mg for headache control with prn raglan for occasional nausea. Given the patient does not have an insurance, she will be stepped down to [facility] for medical stabilization and optimization before being discharged home.    INTERVAL HPI/OVERNIGHT EVENTS:  Patient seen and examined at bedside. Last reported headache was overnight which was present across her forehead into her neck. ROS also positive for dizziness and nausea when she eat, which resulted in emesis x2. Denies fevers/chills, CP, SOB, abdominal pain, changes in bowel/urinary habits.  12pt ROS otherwise negative.    Family History, Social History, Past Medical History, and Home Medications from admission H&P were reviewed and are unchanged unless noted below.     VITALS  Vital Signs Last 24 Hrs  T(C): 36.8 (11 Oct 2022 14:00), Max: 37 (11 Oct 2022 05:59)  T(F): 98.3 (11 Oct 2022 14:00), Max: 98.6 (11 Oct 2022 05:59)  HR: 60 (11 Oct 2022 14:49) (60 - 72)  BP: 90/54 (11 Oct 2022 14:49) (90/54 - 116/64)  BP(mean): 68 (11 Oct 2022 14:49) (66 - 84)  RR: 16 (11 Oct 2022 11:55) (16 - 18)  SpO2: 99% (11 Oct 2022 14:49) (97% - 99%)    Parameters below as of 11 Oct 2022 14:39  Patient On (Oxygen Delivery Method): room air      CAPILLARY BLOOD GLUCOSE    POCT Blood Glucose.: 95 mg/dL (11 Oct 2022 11:03)  POCT Blood Glucose.: 99 mg/dL (11 Oct 2022 06:13)  POCT Blood Glucose.: 128 mg/dL (10 Oct 2022 21:28)  POCT Blood Glucose.: 98 mg/dL (10 Oct 2022 16:43)    PHYSICAL EXAM  General: A&Ox3; NAD  Head: NC/AT; MMM; PERRL; EOMI;  Neck: Supple; no JVD  Respiratory: CTA B/L; no wheezes/crackles   Cardiovascular: Regular rhythm/rate; S1/S2   Gastrointestinal: Soft; NTND; normoactive BS  Extremities: WWP; no edema/cyanosis  Neurological:  CNII-XII grossly intact; no obvious focal deficits    MEDICATIONS  (STANDING):  bisacodyl 5 milliGRAM(s) Oral at bedtime  enoxaparin Injectable 40 milliGRAM(s) SubCutaneous every 24 hours  gabapentin 100 milliGRAM(s) Oral three times a day  insulin lispro (ADMELOG) corrective regimen sliding scale   SubCutaneous Before meals and at bedtime  melatonin 5 milliGRAM(s) Oral at bedtime  polyethylene glycol 3350 17 Gram(s) Oral daily  senna 2 Tablet(s) Oral at bedtime  valproate sodium  IVPB 500 milliGRAM(s) IV Intermittent <User Schedule>  verapamil 40 milliGRAM(s) Oral every 12 hours    MEDICATIONS  (PRN):  acetaminophen     Tablet .. 650 milliGRAM(s) Oral every 6 hours PRN Mild Pain (1 - 3)  ondansetron Injectable 4 milliGRAM(s) IV Push every 6 hours PRN Nausea and/or Vomiting      No Known Allergies      LABS                        11.8   10.95 )-----------( 371      ( 11 Oct 2022 05:30 )             35.6     10-11    137  |  104  |  13  ----------------------------<  111<H>  4.1   |  23  |  0.57    Ca    9.2      11 Oct 2022 05:30  Phos  4.6     10-11  Mg     2.5     10-11        CT Head No Cont (10.05.22 @ 18:54)   INTERPRETATION:  Subarachnoid hemorrhage.    Technique: CT head was performed utilizing axial images from the base of   the skull through the vertex withoutthe administration of intravenous   contrast. Images were reviewed in the bone, brain and subdural windows.    Findings: Comparison is made to a prior CT of the brain performed on   10/5/2022.    There is no evidence of acute intracranial hemorrhage or acute   transcortical infarct.  The ventricles are normal in size and caliber.  There is no evidence of mass-effect or midline shift. There is no   evidence of an intra or extra-axial fluid collection.    Visualized paranasal sinuses and bilateral mastoid air cells are clear.    Impression: Small amount of right posterior temporal parietal occipital   subarachnoid hemorrhage, unchanged. No new acute hemorrhage is seen. TRANSFER ACCEPTANCE NOTE: [facility] -> [facility] (Stroke service)     Hospital Course: 42F with no PMH presenting with headache x 1 week. Pt also complaining of intermittent left eye pain with blurry vision x 3 months. 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 The patient is started on verapamil 40mg and gabapentin 100mg for headache control with prn raglan for occasional nausea. Given the patient does not have an insurance, she will be stepped down to [facility] for medical stabilization and optimization before being discharged home.    INTERVAL HPI/OVERNIGHT EVENTS:  Patient seen and examined at bedside. Pt currently reporting mild headache and sensitivity to light. Denies fevers/chills, CP, SOB, abdominal pain, changes in bowel/urinary habits.  12pt ROS otherwise negative.    Family History, Social History, Past Medical History, and Home Medications from admission H&P were reviewed and are unchanged unless noted below.     VITALS  Vital Signs Last 24 Hrs  T(C): 36.8 (11 Oct 2022 14:00), Max: 37 (11 Oct 2022 05:59)  T(F): 98.3 (11 Oct 2022 14:00), Max: 98.6 (11 Oct 2022 05:59)  HR: 60 (11 Oct 2022 14:49) (60 - 72)  BP: 90/54 (11 Oct 2022 14:49) (90/54 - 116/64)  BP(mean): 68 (11 Oct 2022 14:49) (66 - 84)  RR: 16 (11 Oct 2022 11:55) (16 - 18)  SpO2: 99% (11 Oct 2022 14:49) (97% - 99%)    Parameters below as of 11 Oct 2022 14:39  Patient On (Oxygen Delivery Method): room air      CAPILLARY BLOOD GLUCOSE    POCT Blood Glucose.: 95 mg/dL (11 Oct 2022 11:03)  POCT Blood Glucose.: 99 mg/dL (11 Oct 2022 06:13)  POCT Blood Glucose.: 128 mg/dL (10 Oct 2022 21:28)  POCT Blood Glucose.: 98 mg/dL (10 Oct 2022 16:43)    PHYSICAL EXAM  General: A&Ox3; NAD  Head: NC/AT; MMM; PERRL; EOMI;  Neck: Supple; no JVD  Respiratory: CTA B/L; no wheezes/crackles   Cardiovascular: Regular rhythm/rate; S1/S2   Gastrointestinal: Soft; NTND; normoactive BS  Extremities: WWP; no edema/cyanosis  Neurological:  CNII-XII grossly intact; no obvious focal deficits. 5/5 strength in all extremities    MEDICATIONS  (STANDING):  bisacodyl 5 milliGRAM(s) Oral at bedtime  enoxaparin Injectable 40 milliGRAM(s) SubCutaneous every 24 hours  gabapentin 100 milliGRAM(s) Oral three times a day  insulin lispro (ADMELOG) corrective regimen sliding scale   SubCutaneous Before meals and at bedtime  melatonin 5 milliGRAM(s) Oral at bedtime  polyethylene glycol 3350 17 Gram(s) Oral daily  senna 2 Tablet(s) Oral at bedtime  valproate sodium  IVPB 500 milliGRAM(s) IV Intermittent <User Schedule>  verapamil 40 milliGRAM(s) Oral every 12 hours    MEDICATIONS  (PRN):  acetaminophen     Tablet .. 650 milliGRAM(s) Oral every 6 hours PRN Mild Pain (1 - 3)  ondansetron Injectable 4 milliGRAM(s) IV Push every 6 hours PRN Nausea and/or Vomiting      No Known Allergies      LABS                        11.8   10.95 )-----------( 371      ( 11 Oct 2022 05:30 )             35.6     10-11    137  |  104  |  13  ----------------------------<  111<H>  4.1   |  23  |  0.57    Ca    9.2      11 Oct 2022 05:30  Phos  4.6     10-11  Mg     2.5     10-11        CT Head No Cont (10.05.22 @ 18:54)   INTERPRETATION:  Subarachnoid hemorrhage.    Technique: CT head was performed utilizing axial images from the base of   the skull through the vertex withoutthe administration of intravenous   contrast. Images were reviewed in the bone, brain and subdural windows.    Findings: Comparison is made to a prior CT of the brain performed on   10/5/2022.    There is no evidence of acute intracranial hemorrhage or acute   transcortical infarct.  The ventricles are normal in size and caliber.  There is no evidence of mass-effect or midline shift. There is no   evidence of an intra or extra-axial fluid collection.    Visualized paranasal sinuses and bilateral mastoid air cells are clear.    Impression: Small amount of right posterior temporal parietal occipital   subarachnoid hemorrhage, unchanged. No new acute hemorrhage is seen.

## 2022-10-11 NOTE — DISCHARGE NOTE PROVIDER - NSDCCPCAREPLAN_GEN_ALL_CORE_FT
PRINCIPAL DISCHARGE DIAGNOSIS  Diagnosis: Reversible cerebrovascular vasoconstriction syndrome  Assessment and Plan of Treatment: El síndrome de vasoconstricción cerebral reversible (RCVS, por tommie siglas en inglés) es isaac condición beatrice que ocurre irma resultado de isaac constricción repentina (estrechamiento) de los vasos que suministran chad al cerebro. El síntoma principal de RCVS son constance de serene repentinos, intensos e incapacitantes que a veces se denominan constance de serene en “trueno”. La mayoría de los pacientes con RCVS se recuperan por completo. Isaac minoría de pacientes tiene problemas neurológicos. RCVS no suele volver. Hicimos múltiples imágenes de madison cerebro que confirmaron la presencia del síndrome vasoconstrictor cerebral reversible.       PRINCIPAL DISCHARGE DIAGNOSIS  Diagnosis: Reversible cerebrovascular vasoconstriction syndrome  Assessment and Plan of Treatment: El síndrome de vasoconstricción cerebral reversible (RCVS, por tommie siglas en inglés) es isaac condición beatrice que ocurre irma resultado de isaac constricción repentina (estrechamiento) de los vasos que suministran chad al cerebro. El síntoma principal de RCVS son constance de serene repentinos, intensos e incapacitantes que a veces se denominan constance de serene en “trueno”. La mayoría de los pacientes con RCVS se recuperan por completo. Isaac minoría de pacientes tiene problemas neurológicos. RCVS no suele volver. Hicimos múltiples imágenes de madison cerebro que confirmaron la presencia del síndrome vasoconstrictor cerebral reversible.      SECONDARY DISCHARGE DIAGNOSES  Diagnosis: Acute headache  Assessment and Plan of Treatment: Tuviste constance de serene mientras estabas en el hospital.  [place] verapamil 40 mg cada 12 horas.  [place] depakote 500 mg isaac vez al día.  [place] neurontin 100 mg cada 8 horas.  Diríjase a isaac bladimir de emergencias si experimenta un empeoramiento del dolor de serene, cambios en la visión, empeoramiento de las náuseas o los vómitos.

## 2022-10-12 ENCOUNTER — TRANSCRIPTION ENCOUNTER (OUTPATIENT)
Age: 42
End: 2022-10-12

## 2022-10-12 VITALS
HEART RATE: 68 BPM | TEMPERATURE: 98 F | RESPIRATION RATE: 18 BRPM | DIASTOLIC BLOOD PRESSURE: 65 MMHG | OXYGEN SATURATION: 98 % | SYSTOLIC BLOOD PRESSURE: 108 MMHG

## 2022-10-12 PROBLEM — Z00.00 ENCOUNTER FOR PREVENTIVE HEALTH EXAMINATION: Status: ACTIVE | Noted: 2022-10-12

## 2022-10-12 LAB
ANION GAP SERPL CALC-SCNC: 10 MMOL/L — SIGNIFICANT CHANGE UP (ref 5–17)
BUN SERPL-MCNC: 19 MG/DL — SIGNIFICANT CHANGE UP (ref 7–23)
CALCIUM SERPL-MCNC: 8.8 MG/DL — SIGNIFICANT CHANGE UP (ref 8.4–10.5)
CHLORIDE SERPL-SCNC: 105 MMOL/L — SIGNIFICANT CHANGE UP (ref 96–108)
CO2 SERPL-SCNC: 24 MMOL/L — SIGNIFICANT CHANGE UP (ref 22–31)
CREAT SERPL-MCNC: 0.66 MG/DL — SIGNIFICANT CHANGE UP (ref 0.5–1.3)
EGFR: 112 ML/MIN/1.73M2 — SIGNIFICANT CHANGE UP
GLUCOSE BLDC GLUCOMTR-MCNC: 85 MG/DL — SIGNIFICANT CHANGE UP (ref 70–99)
GLUCOSE BLDC GLUCOMTR-MCNC: 99 MG/DL — SIGNIFICANT CHANGE UP (ref 70–99)
GLUCOSE SERPL-MCNC: 86 MG/DL — SIGNIFICANT CHANGE UP (ref 70–99)
HCT VFR BLD CALC: 36.8 % — SIGNIFICANT CHANGE UP (ref 34.5–45)
HGB BLD-MCNC: 11.8 G/DL — SIGNIFICANT CHANGE UP (ref 11.5–15.5)
MAGNESIUM SERPL-MCNC: 2.3 MG/DL — SIGNIFICANT CHANGE UP (ref 1.6–2.6)
MCHC RBC-ENTMCNC: 28.2 PG — SIGNIFICANT CHANGE UP (ref 27–34)
MCHC RBC-ENTMCNC: 32.1 GM/DL — SIGNIFICANT CHANGE UP (ref 32–36)
MCV RBC AUTO: 88 FL — SIGNIFICANT CHANGE UP (ref 80–100)
NRBC # BLD: 0 /100 WBCS — SIGNIFICANT CHANGE UP (ref 0–0)
PHOSPHATE SERPL-MCNC: 4.5 MG/DL — SIGNIFICANT CHANGE UP (ref 2.5–4.5)
PLATELET # BLD AUTO: 372 K/UL — SIGNIFICANT CHANGE UP (ref 150–400)
POTASSIUM SERPL-MCNC: 4.1 MMOL/L — SIGNIFICANT CHANGE UP (ref 3.5–5.3)
POTASSIUM SERPL-SCNC: 4.1 MMOL/L — SIGNIFICANT CHANGE UP (ref 3.5–5.3)
RBC # BLD: 4.18 M/UL — SIGNIFICANT CHANGE UP (ref 3.8–5.2)
RBC # FLD: 13.3 % — SIGNIFICANT CHANGE UP (ref 10.3–14.5)
SARS-COV-2 RNA SPEC QL NAA+PROBE: SIGNIFICANT CHANGE UP
SODIUM SERPL-SCNC: 139 MMOL/L — SIGNIFICANT CHANGE UP (ref 135–145)
WBC # BLD: 7.17 K/UL — SIGNIFICANT CHANGE UP (ref 3.8–10.5)
WBC # FLD AUTO: 7.17 K/UL — SIGNIFICANT CHANGE UP (ref 3.8–10.5)

## 2022-10-12 PROCEDURE — 86160 COMPLEMENT ANTIGEN: CPT

## 2022-10-12 PROCEDURE — 80307 DRUG TEST PRSMV CHEM ANLYZR: CPT

## 2022-10-12 PROCEDURE — 80048 BASIC METABOLIC PNL TOTAL CA: CPT

## 2022-10-12 PROCEDURE — 85025 COMPLETE CBC W/AUTO DIFF WBC: CPT

## 2022-10-12 PROCEDURE — 88305 TISSUE EXAM BY PATHOLOGIST: CPT

## 2022-10-12 PROCEDURE — 87798 DETECT AGENT NOS DNA AMP: CPT

## 2022-10-12 PROCEDURE — 97530 THERAPEUTIC ACTIVITIES: CPT

## 2022-10-12 PROCEDURE — 96375 TX/PRO/DX INJ NEW DRUG ADDON: CPT

## 2022-10-12 PROCEDURE — 87340 HEPATITIS B SURFACE AG IA: CPT

## 2022-10-12 PROCEDURE — 80053 COMPREHEN METABOLIC PANEL: CPT

## 2022-10-12 PROCEDURE — 84100 ASSAY OF PHOSPHORUS: CPT

## 2022-10-12 PROCEDURE — 86901 BLOOD TYPING SEROLOGIC RH(D): CPT

## 2022-10-12 PROCEDURE — 83036 HEMOGLOBIN GLYCOSYLATED A1C: CPT

## 2022-10-12 PROCEDURE — 83916 OLIGOCLONAL BANDS: CPT

## 2022-10-12 PROCEDURE — U0005: CPT

## 2022-10-12 PROCEDURE — 83615 LACTATE (LD) (LDH) ENZYME: CPT

## 2022-10-12 PROCEDURE — 70553 MRI BRAIN STEM W/O & W/DYE: CPT

## 2022-10-12 PROCEDURE — 87205 SMEAR GRAM STAIN: CPT

## 2022-10-12 PROCEDURE — C1769: CPT

## 2022-10-12 PROCEDURE — 81001 URINALYSIS AUTO W/SCOPE: CPT

## 2022-10-12 PROCEDURE — 93005 ELECTROCARDIOGRAM TRACING: CPT

## 2022-10-12 PROCEDURE — 86403 PARTICLE AGGLUT ANTBDY SCRN: CPT

## 2022-10-12 PROCEDURE — 87635 SARS-COV-2 COVID-19 AMP PRB: CPT

## 2022-10-12 PROCEDURE — 86617 LYME DISEASE ANTIBODY: CPT

## 2022-10-12 PROCEDURE — 86225 DNA ANTIBODY NATIVE: CPT

## 2022-10-12 PROCEDURE — 84484 ASSAY OF TROPONIN QUANT: CPT

## 2022-10-12 PROCEDURE — 86900 BLOOD TYPING SEROLOGIC ABO: CPT

## 2022-10-12 PROCEDURE — 96374 THER/PROPH/DIAG INJ IV PUSH: CPT

## 2022-10-12 PROCEDURE — 86788 WEST NILE VIRUS AB IGM: CPT

## 2022-10-12 PROCEDURE — 87070 CULTURE OTHR SPECIMN AEROBIC: CPT

## 2022-10-12 PROCEDURE — 36415 COLL VENOUS BLD VENIPUNCTURE: CPT

## 2022-10-12 PROCEDURE — 83519 RIA NONANTIBODY: CPT

## 2022-10-12 PROCEDURE — C1887: CPT

## 2022-10-12 PROCEDURE — 70498 CT ANGIOGRAPHY NECK: CPT | Mod: MG

## 2022-10-12 PROCEDURE — 93306 TTE W/DOPPLER COMPLETE: CPT

## 2022-10-12 PROCEDURE — 97535 SELF CARE MNGMENT TRAINING: CPT

## 2022-10-12 PROCEDURE — 71045 X-RAY EXAM CHEST 1 VIEW: CPT

## 2022-10-12 PROCEDURE — 85027 COMPLETE CBC AUTOMATED: CPT

## 2022-10-12 PROCEDURE — 85610 PROTHROMBIN TIME: CPT

## 2022-10-12 PROCEDURE — 80061 LIPID PANEL: CPT

## 2022-10-12 PROCEDURE — 86140 C-REACTIVE PROTEIN: CPT

## 2022-10-12 PROCEDURE — 97161 PT EVAL LOW COMPLEX 20 MIN: CPT

## 2022-10-12 PROCEDURE — 87116 MYCOBACTERIA CULTURE: CPT

## 2022-10-12 PROCEDURE — C1894: CPT

## 2022-10-12 PROCEDURE — 85652 RBC SED RATE AUTOMATED: CPT

## 2022-10-12 PROCEDURE — 87102 FUNGUS ISOLATION CULTURE: CPT

## 2022-10-12 PROCEDURE — 82595 ASSAY OF CRYOGLOBULIN: CPT

## 2022-10-12 PROCEDURE — 84166 PROTEIN E-PHORESIS/URINE/CSF: CPT

## 2022-10-12 PROCEDURE — G1004: CPT

## 2022-10-12 PROCEDURE — 86592 SYPHILIS TEST NON-TREP QUAL: CPT

## 2022-10-12 PROCEDURE — 70450 CT HEAD/BRAIN W/O DYE: CPT

## 2022-10-12 PROCEDURE — 86704 HEP B CORE ANTIBODY TOTAL: CPT

## 2022-10-12 PROCEDURE — 82164 ANGIOTENSIN I ENZYME TEST: CPT

## 2022-10-12 PROCEDURE — 83735 ASSAY OF MAGNESIUM: CPT

## 2022-10-12 PROCEDURE — 86038 ANTINUCLEAR ANTIBODIES: CPT

## 2022-10-12 PROCEDURE — 86255 FLUORESCENT ANTIBODY SCREEN: CPT

## 2022-10-12 PROCEDURE — 88108 CYTOPATH CONCENTRATE TECH: CPT

## 2022-10-12 PROCEDURE — 86480 TB TEST CELL IMMUN MEASURE: CPT

## 2022-10-12 PROCEDURE — 86803 HEPATITIS C AB TEST: CPT

## 2022-10-12 PROCEDURE — 84702 CHORIONIC GONADOTROPIN TEST: CPT

## 2022-10-12 PROCEDURE — 84157 ASSAY OF PROTEIN OTHER: CPT

## 2022-10-12 PROCEDURE — 99285 EMERGENCY DEPT VISIT HI MDM: CPT | Mod: 25

## 2022-10-12 PROCEDURE — 86362 MOG-IGG1 ANTB CBA EACH: CPT

## 2022-10-12 PROCEDURE — 89051 BODY FLUID CELL COUNT: CPT

## 2022-10-12 PROCEDURE — 86705 HEP B CORE ANTIBODY IGM: CPT

## 2022-10-12 PROCEDURE — 93970 EXTREMITY STUDY: CPT

## 2022-10-12 PROCEDURE — 82962 GLUCOSE BLOOD TEST: CPT

## 2022-10-12 PROCEDURE — 86235 NUCLEAR ANTIGEN ANTIBODY: CPT

## 2022-10-12 PROCEDURE — 99233 SBSQ HOSP IP/OBS HIGH 50: CPT

## 2022-10-12 PROCEDURE — 97112 NEUROMUSCULAR REEDUCATION: CPT

## 2022-10-12 PROCEDURE — 99239 HOSP IP/OBS DSCHRG MGMT >30: CPT

## 2022-10-12 PROCEDURE — 70496 CT ANGIOGRAPHY HEAD: CPT | Mod: ME

## 2022-10-12 PROCEDURE — 82945 GLUCOSE OTHER FLUID: CPT

## 2022-10-12 PROCEDURE — 85730 THROMBOPLASTIN TIME PARTIAL: CPT

## 2022-10-12 PROCEDURE — 86789 WEST NILE VIRUS ANTIBODY: CPT

## 2022-10-12 PROCEDURE — 86850 RBC ANTIBODY SCREEN: CPT

## 2022-10-12 PROCEDURE — 87483 CNS DNA AMP PROBE TYPE 12-25: CPT

## 2022-10-12 PROCEDURE — 85598 HEXAGNAL PHOSPH PLTLT NEUTRL: CPT

## 2022-10-12 PROCEDURE — 82784 ASSAY IGA/IGD/IGG/IGM EACH: CPT

## 2022-10-12 PROCEDURE — 86341 ISLET CELL ANTIBODY: CPT

## 2022-10-12 PROCEDURE — 86036 ANCA SCREEN EACH ANTIBODY: CPT

## 2022-10-12 PROCEDURE — 97116 GAIT TRAINING THERAPY: CPT

## 2022-10-12 PROCEDURE — 86706 HEP B SURFACE ANTIBODY: CPT

## 2022-10-12 PROCEDURE — 84443 ASSAY THYROID STIM HORMONE: CPT

## 2022-10-12 PROCEDURE — U0003: CPT

## 2022-10-12 RX ORDER — VALPROIC ACID (AS SODIUM SALT) 250 MG/5ML
5 SOLUTION, ORAL ORAL
Qty: 0 | Refills: 0 | DISCHARGE
Start: 2022-10-12

## 2022-10-12 RX ORDER — DIVALPROEX SODIUM 500 MG/1
1 TABLET, DELAYED RELEASE ORAL
Qty: 7 | Refills: 0
Start: 2022-10-12 | End: 2022-10-18

## 2022-10-12 RX ORDER — GABAPENTIN 400 MG/1
1 CAPSULE ORAL
Qty: 0 | Refills: 0 | DISCHARGE
Start: 2022-10-12

## 2022-10-12 RX ORDER — DIVALPROEX SODIUM 500 MG/1
1 TABLET, DELAYED RELEASE ORAL
Qty: 21 | Refills: 0
Start: 2022-10-12 | End: 2022-11-01

## 2022-10-12 RX ORDER — GABAPENTIN 400 MG/1
1 CAPSULE ORAL
Qty: 21 | Refills: 0
Start: 2022-10-12 | End: 2022-10-18

## 2022-10-12 RX ORDER — VERAPAMIL HCL 240 MG
1 CAPSULE, EXTENDED RELEASE PELLETS 24 HR ORAL
Qty: 42 | Refills: 0
Start: 2022-10-12 | End: 2022-11-01

## 2022-10-12 RX ORDER — VERAPAMIL HCL 240 MG
1 CAPSULE, EXTENDED RELEASE PELLETS 24 HR ORAL
Qty: 14 | Refills: 0
Start: 2022-10-12 | End: 2022-10-18

## 2022-10-12 RX ORDER — VERAPAMIL HCL 240 MG
1 CAPSULE, EXTENDED RELEASE PELLETS 24 HR ORAL
Qty: 0 | Refills: 0 | DISCHARGE
Start: 2022-10-12

## 2022-10-12 RX ORDER — GABAPENTIN 400 MG/1
1 CAPSULE ORAL
Qty: 63 | Refills: 0
Start: 2022-10-12 | End: 2022-11-01

## 2022-10-12 RX ORDER — VERAPAMIL HCL 240 MG
1 CAPSULE, EXTENDED RELEASE PELLETS 24 HR ORAL
Qty: 60 | Refills: 0
Start: 2022-10-12 | End: 2022-11-10

## 2022-10-12 RX ORDER — VALPROIC ACID (AS SODIUM SALT) 250 MG/5ML
2 SOLUTION, ORAL ORAL
Qty: 14 | Refills: 0
Start: 2022-10-12 | End: 2022-10-18

## 2022-10-12 RX ADMIN — Medication 55 MILLIGRAM(S): at 07:12

## 2022-10-12 RX ADMIN — POLYETHYLENE GLYCOL 3350 17 GRAM(S): 17 POWDER, FOR SOLUTION ORAL at 11:11

## 2022-10-12 RX ADMIN — Medication 55 MILLIGRAM(S): at 01:00

## 2022-10-12 RX ADMIN — GABAPENTIN 100 MILLIGRAM(S): 400 CAPSULE ORAL at 13:09

## 2022-10-12 RX ADMIN — GABAPENTIN 100 MILLIGRAM(S): 400 CAPSULE ORAL at 06:26

## 2022-10-12 RX ADMIN — Medication 40 MILLIGRAM(S): at 12:37

## 2022-10-12 NOTE — PROGRESS NOTE ADULT - PROBLEM SELECTOR PLAN 2
CTA shows "focal areas of narrowing involving the right A2 and left A3 segments which may represent vasculitis;" ddx also includes reversible cerebral vasoconstriction syndrome; cont. work-up and mgmt per Neuro and Rheum; PT/OT
CTA shows "focal areas of narrowing involving the right A2 and left A3 segments which may represent vasculitis;" ddx also includes reversible cerebral vasoconstriction syndrome; cont. work-up and mgmt per Rheum and Neuro; PT/OT
CTA shows "focal areas of narrowing involving the right A2 and left A3 segments which may represent vasculitis;" ddx also includes reversible cerebral vasoconstriction syndrome; cont. work-up and mgmt per Neuro and Rheum; PT/OT

## 2022-10-12 NOTE — PROGRESS NOTE ADULT - TIME BILLING
d/w Stroke resident and ACP  will follow w/ you
d/w Stroke resident and ACP  will follow w/ you
review of patient information including recent vital signs, labs, imaging, and notes; assessing, examining patient; updating patient/family; discussion and coordination of care with multidisciplinary team.
d/c planning  Pt. uninsured -- see CM/SW notes   will follow w/ you
review of patient information including recent vital signs, labs, imaging, and notes; assessing, examining patient; updating patient/family; discussion and coordination of care with multidisciplinary team.

## 2022-10-12 NOTE — PROGRESS NOTE ADULT - PROBLEM SELECTOR PROBLEM 1
Subarachnoid hemorrhage, nontraumatic

## 2022-10-12 NOTE — DISCHARGE NOTE NURSING/CASE MANAGEMENT/SOCIAL WORK - NSDCPEFALRISK_GEN_ALL_CORE
For information on Fall & Injury Prevention, visit: [email or web address] OR  [email or web address] OR  https://www.cdc.gov/steadi/patient.html

## 2022-10-12 NOTE — PROGRESS NOTE ADULT - SUBJECTIVE AND OBJECTIVE BOX
Patient is a 42y old  Female who presents with a chief complaint of Headache (11 Oct 2022 16:00)      INTERVAL HPI/OVERNIGHT EVENTS:    Pt. seen and examined earlier today  Pt. c/o intermittent HA, but reports feeling better  No new complaints    Review of Systems: 12 point review of systems otherwise negative    MEDICATIONS  (STANDING):  bisacodyl 5 milliGRAM(s) Oral at bedtime  enoxaparin Injectable 40 milliGRAM(s) SubCutaneous every 24 hours  gabapentin 100 milliGRAM(s) Oral three times a day  insulin lispro (ADMELOG) corrective regimen sliding scale   SubCutaneous Before meals and at bedtime  melatonin 5 milliGRAM(s) Oral at bedtime  polyethylene glycol 3350 17 Gram(s) Oral daily  senna 2 Tablet(s) Oral at bedtime  valproate sodium  IVPB 500 milliGRAM(s) IV Intermittent <User Schedule>  verapamil 40 milliGRAM(s) Oral every 12 hours    MEDICATIONS  (PRN):  acetaminophen     Tablet .. 650 milliGRAM(s) Oral every 6 hours PRN Mild Pain (1 - 3)  ondansetron Injectable 4 milliGRAM(s) IV Push every 6 hours PRN Nausea and/or Vomiting      Allergies    No Known Allergies    Intolerances          Vital Signs Last 24 Hrs  T(C): 36.4 (12 Oct 2022 05:48), Max: 37.1 (11 Oct 2022 20:58)  T(F): 97.5 (12 Oct 2022 05:48), Max: 98.7 (11 Oct 2022 20:58)  HR: 59 (12 Oct 2022 05:48) (59 - 68)  BP: 94/61 (12 Oct 2022 05:48) (90/54 - 103/59)  BP(mean): 68 (11 Oct 2022 14:49) (68 - 74)  RR: 16 (12 Oct 2022 05:48) (16 - 19)  SpO2: 96% (12 Oct 2022 05:48) (96% - 99%)    Parameters below as of 12 Oct 2022 05:48  Patient On (Oxygen Delivery Method): room air      CAPILLARY BLOOD GLUCOSE      POCT Blood Glucose.: 85 mg/dL (12 Oct 2022 08:38)  POCT Blood Glucose.: 89 mg/dL (11 Oct 2022 22:29)      10-11 @ 07:01  -  10-12 @ 07:00  --------------------------------------------------------  IN: 0 mL / OUT: 750 mL / NET: -750 mL        Physical Exam:  (earlier today)  Daily     Daily   General:  comfortable-appearing in NAD  HEENT:  MMM  CV:  RRR  Abdomen:  soft NT ND  Extremities:  no edema B/L LE  Skin:  WWP  Neuro:  AAOx3, no dysarthria    LABS:                        11.8   7.17  )-----------( 372      ( 12 Oct 2022 05:30 )             36.8     10-12    139  |  105  |  19  ----------------------------<  86  4.1   |  24  |  0.66    Ca    8.8      12 Oct 2022 05:30  Phos  4.5     10-12  Mg     2.3     10-12

## 2022-10-12 NOTE — PROGRESS NOTE ADULT - PROBLEM SELECTOR PLAN 1
stable; cont. mgmt per Neuro and Neurosurgery

## 2022-10-12 NOTE — PROGRESS NOTE ADULT - PROVIDER SPECIALTY LIST ADULT
NSICU
Neurology
Neurosurgery
Internal Medicine
NSICU
Neurology
Rheumatology
NSICU
Neurology
Neurology
Neurosurgery
Internal Medicine
Internal Medicine
NSICU
NSICU
Internal Medicine
Internal Medicine

## 2022-10-12 NOTE — DISCHARGE NOTE NURSING/CASE MANAGEMENT/SOCIAL WORK - PATIENT PORTAL LINK FT
You can access the FollowMyHealth Patient Portal offered by [facility] by registering at the following website: [email or web address]. By joining [employer]’s FollowMyHealth portal, you will also be able to view your health information using other applications (apps) compatible with our system.

## 2022-10-13 LAB
WNV RNA SPEC QL NAA+PROBE: SIGNIFICANT CHANGE UP
WNV RNA SPEC QL NAA+PROBE: SIGNIFICANT CHANGE UP

## 2022-10-14 LAB
AMPA-R AB CBA, CSF: NEGATIVE — SIGNIFICANT CHANGE UP
AMPHIPHYSIN AB TITR CSF: NEGATIVE TITER — SIGNIFICANT CHANGE UP
CASPR2-IGG CBA, CSF: NEGATIVE — SIGNIFICANT CHANGE UP
CV2 IGG TITR CSF: NEGATIVE TITER — SIGNIFICANT CHANGE UP
DPPX ANTIBODY IFA, CSF: NEGATIVE — SIGNIFICANT CHANGE UP
GABA-B-R AB CBA, CSF: NEGATIVE — SIGNIFICANT CHANGE UP
GAD65 AB CSF-SCNC: 0 NMOL/L — SIGNIFICANT CHANGE UP
GFAP IFA, CSF: NEGATIVE — SIGNIFICANT CHANGE UP
GLIAL NUC TYPE 1 AB TITR CSF: NEGATIVE TITER — SIGNIFICANT CHANGE UP
HU1 AB TITR CSF IF: NEGATIVE TITER — SIGNIFICANT CHANGE UP
HU2 AB TITR CSF IF: NEGATIVE TITER — SIGNIFICANT CHANGE UP
HU3 AB TITR CSF: NEGATIVE TITER — SIGNIFICANT CHANGE UP
IGLON5 IFA, CSF: NEGATIVE — SIGNIFICANT CHANGE UP
IMMUNOLOGIST REVIEW: SIGNIFICANT CHANGE UP
LGI1-IGG CBA, CSF: NEGATIVE — SIGNIFICANT CHANGE UP
MGLUR1 AB IFA, CSF: NEGATIVE — SIGNIFICANT CHANGE UP
MOG AB CSF QL CBA IFA: NEGATIVE — SIGNIFICANT CHANGE UP
NIF IFA, CSF: NEGATIVE — SIGNIFICANT CHANGE UP
NMDA-R AB CBA, CSF: NEGATIVE — SIGNIFICANT CHANGE UP
PCA-TR AB TITR CSF: NEGATIVE TITER — SIGNIFICANT CHANGE UP
PURKINJE CELL CYTOPLASMIC AB TYPE 2: NEGATIVE TITER — SIGNIFICANT CHANGE UP
PURKINJE CELLS AB TITR CSF IF: NEGATIVE TITER — SIGNIFICANT CHANGE UP
REFLEX ADDED: SIGNIFICANT CHANGE UP

## 2022-10-17 LAB
OLIGOCLONAL BANDS CSF ELPH-IMP: SIGNIFICANT CHANGE UP
OLIGOCLONAL BANDS CSF ELPH-IMP: SIGNIFICANT CHANGE UP

## 2022-10-18 DIAGNOSIS — D72.828 OTHER ELEVATED WHITE BLOOD CELL COUNT: ICD-10-CM
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
## 2022-10-18 DIAGNOSIS — G97.81 OTHER INTRAOPERATIVE COMPLICATIONS OF NERVOUS SYSTEM: ICD-10-CM

## 2022-10-18 DIAGNOSIS — I67.841 REVERSIBLE CEREBROVASCULAR VASOCONSTRICTION SYNDROME: ICD-10-CM

## 2022-10-18 DIAGNOSIS — R51.9 HEADACHE, UNSPECIFIED: ICD-10-CM

## 2022-10-18 DIAGNOSIS — I60.8 OTHER NONTRAUMATIC SUBARACHNOID HEMORRHAGE: ICD-10-CM

## 2022-10-18 DIAGNOSIS — I67.7 CEREBRAL ARTERITIS, NOT ELSEWHERE CLASSIFIED: ICD-10-CM

## 2022-10-18 DIAGNOSIS — D32.0 BENIGN NEOPLASM OF CEREBRAL MENINGES: ICD-10-CM
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
## 2022-10-20 LAB — B BURGDOR AB CSF-ACNC: SIGNIFICANT CHANGE UP

## 2022-10-21 LAB
CULTURE RESULTS: NO GROWTH — SIGNIFICANT CHANGE UP
SPECIMEN SOURCE: SIGNIFICANT CHANGE UP

## 2022-11-05 LAB
CULTURE RESULTS: SIGNIFICANT CHANGE UP
SPECIMEN SOURCE: SIGNIFICANT CHANGE UP

## 2022-11-11 NOTE — PHYSICAL THERAPY INITIAL EVALUATION ADULT - BED MOBILITY LIMITATIONS, REHAB EVAL
decreased ability to use legs for bridging/pushing/impaired ability to control trunk for mobility
Magnolia Ochoa

## 2022-11-26 LAB
CULTURE RESULTS: SIGNIFICANT CHANGE UP
SPECIMEN SOURCE: SIGNIFICANT CHANGE UP

## 2023-06-17 NOTE — PATIENT PROFILE ADULT - FUNCTIONAL SCREEN CURRENT LEVEL: SWALLOWING (IF SCORE 2 OR MORE FOR ANY ITEM, CONSULT REHAB SERVICES), MLM)
Spine appears normal, range of motion is not limited, no muscle or joint tenderness 0 = swallows foods/liquids without difficulty